# Patient Record
Sex: FEMALE | Race: WHITE | Employment: OTHER | URBAN - METROPOLITAN AREA
[De-identification: names, ages, dates, MRNs, and addresses within clinical notes are randomized per-mention and may not be internally consistent; named-entity substitution may affect disease eponyms.]

---

## 2017-03-16 ENCOUNTER — HOSPITAL ENCOUNTER (OUTPATIENT)
Dept: CT IMAGING | Age: 42
Discharge: HOME | End: 2017-03-16
Attending: FAMILY MEDICINE
Payer: COMMERCIAL

## 2017-03-16 DIAGNOSIS — R51.9 FACIAL PAIN: ICD-10-CM

## 2017-03-16 PROCEDURE — 70450 CT HEAD/BRAIN W/O DYE: CPT

## 2017-07-26 ENCOUNTER — HOSPITAL ENCOUNTER (EMERGENCY)
Age: 42
Discharge: HOME | End: 2017-07-26
Attending: EMERGENCY MEDICINE
Payer: COMMERCIAL

## 2017-07-26 ENCOUNTER — APPOINTMENT (OUTPATIENT)
Dept: CT IMAGING | Age: 42
End: 2017-07-26
Attending: EMERGENCY MEDICINE
Payer: COMMERCIAL

## 2017-07-26 ENCOUNTER — APPOINTMENT (OUTPATIENT)
Dept: GENERAL RADIOLOGY | Age: 42
End: 2017-07-26
Attending: EMERGENCY MEDICINE
Payer: COMMERCIAL

## 2017-07-26 VITALS
BODY MASS INDEX: 23.37 KG/M2 | SYSTOLIC BLOOD PRESSURE: 131 MMHG | OXYGEN SATURATION: 98 % | TEMPERATURE: 98.1 F | HEART RATE: 84 BPM | DIASTOLIC BLOOD PRESSURE: 72 MMHG | RESPIRATION RATE: 18 BRPM | HEIGHT: 62 IN | WEIGHT: 127 LBS

## 2017-07-26 DIAGNOSIS — R10.13 ABDOMINAL PAIN, EPIGASTRIC: Primary | ICD-10-CM

## 2017-07-26 LAB
ALBUMIN SERPL BCP-MCNC: 3.6 G/DL (ref 3.5–5)
ALBUMIN/GLOB SERPL: 1 {RATIO} (ref 1.1–2.2)
ALP SERPL-CCNC: 94 U/L (ref 45–117)
ALT SERPL-CCNC: 65 U/L (ref 12–78)
ANION GAP BLD CALC-SCNC: 4 MMOL/L (ref 5–15)
AST SERPL W P-5'-P-CCNC: 32 U/L (ref 15–37)
ATRIAL RATE: 66 BPM
BASOPHILS # BLD AUTO: 0 K/UL (ref 0–0.1)
BASOPHILS # BLD: 0 % (ref 0–1)
BILIRUB SERPL-MCNC: 0.6 MG/DL (ref 0.2–1)
BUN SERPL-MCNC: 8 MG/DL (ref 6–20)
BUN/CREAT SERPL: 11 (ref 12–20)
CALCIUM SERPL-MCNC: 8.6 MG/DL (ref 8.5–10.1)
CALCULATED P AXIS, ECG09: 19 DEGREES
CALCULATED R AXIS, ECG10: 49 DEGREES
CALCULATED T AXIS, ECG11: 43 DEGREES
CHLORIDE SERPL-SCNC: 101 MMOL/L (ref 97–108)
CK SERPL-CCNC: 95 U/L (ref 26–192)
CO2 SERPL-SCNC: 29 MMOL/L (ref 21–32)
CREAT SERPL-MCNC: 0.71 MG/DL (ref 0.55–1.02)
D DIMER PPP FEU-MCNC: 0.19 MG/L FEU (ref 0–0.65)
DIAGNOSIS, 93000: NORMAL
EOSINOPHIL # BLD: 0.1 K/UL (ref 0–0.4)
EOSINOPHIL NFR BLD: 1 % (ref 0–7)
ERYTHROCYTE [DISTWIDTH] IN BLOOD BY AUTOMATED COUNT: 15.5 % (ref 11.5–14.5)
GLOBULIN SER CALC-MCNC: 3.6 G/DL (ref 2–4)
GLUCOSE BLD STRIP.AUTO-MCNC: 70 MG/DL (ref 65–100)
GLUCOSE BLD STRIP.AUTO-MCNC: 80 MG/DL (ref 65–100)
GLUCOSE SERPL-MCNC: 210 MG/DL (ref 65–100)
HCT VFR BLD AUTO: 37 % (ref 35–47)
HGB BLD-MCNC: 12.3 G/DL (ref 11.5–16)
LYMPHOCYTES # BLD AUTO: 20 % (ref 12–49)
LYMPHOCYTES # BLD: 2.4 K/UL (ref 0.8–3.5)
MCH RBC QN AUTO: 29 PG (ref 26–34)
MCHC RBC AUTO-ENTMCNC: 33.2 G/DL (ref 30–36.5)
MCV RBC AUTO: 87.3 FL (ref 80–99)
MONOCYTES # BLD: 0.5 K/UL (ref 0–1)
MONOCYTES NFR BLD AUTO: 4 % (ref 5–13)
NEUTS SEG # BLD: 8.9 K/UL (ref 1.8–8)
NEUTS SEG NFR BLD AUTO: 75 % (ref 32–75)
P-R INTERVAL, ECG05: 146 MS
PLATELET # BLD AUTO: 462 K/UL (ref 150–400)
POTASSIUM SERPL-SCNC: 4.2 MMOL/L (ref 3.5–5.1)
PROT SERPL-MCNC: 7.2 G/DL (ref 6.4–8.2)
Q-T INTERVAL, ECG07: 396 MS
QRS DURATION, ECG06: 78 MS
QTC CALCULATION (BEZET), ECG08: 415 MS
RBC # BLD AUTO: 4.24 M/UL (ref 3.8–5.2)
SERVICE CMNT-IMP: NORMAL
SERVICE CMNT-IMP: NORMAL
SODIUM SERPL-SCNC: 134 MMOL/L (ref 136–145)
TROPONIN I SERPL-MCNC: <0.04 NG/ML
TSH SERPL DL<=0.05 MIU/L-ACNC: 1.26 UIU/ML (ref 0.36–3.74)
VENTRICULAR RATE, ECG03: 66 BPM
WBC # BLD AUTO: 12 K/UL (ref 3.6–11)

## 2017-07-26 PROCEDURE — 99285 EMERGENCY DEPT VISIT HI MDM: CPT

## 2017-07-26 PROCEDURE — 85379 FIBRIN DEGRADATION QUANT: CPT | Performed by: EMERGENCY MEDICINE

## 2017-07-26 PROCEDURE — 74011250637 HC RX REV CODE- 250/637: Performed by: EMERGENCY MEDICINE

## 2017-07-26 PROCEDURE — 93005 ELECTROCARDIOGRAM TRACING: CPT

## 2017-07-26 PROCEDURE — 96375 TX/PRO/DX INJ NEW DRUG ADDON: CPT

## 2017-07-26 PROCEDURE — 80053 COMPREHEN METABOLIC PANEL: CPT | Performed by: EMERGENCY MEDICINE

## 2017-07-26 PROCEDURE — 36415 COLL VENOUS BLD VENIPUNCTURE: CPT | Performed by: EMERGENCY MEDICINE

## 2017-07-26 PROCEDURE — 71010 XR CHEST PORT: CPT

## 2017-07-26 PROCEDURE — 74177 CT ABD & PELVIS W/CONTRAST: CPT

## 2017-07-26 PROCEDURE — 84484 ASSAY OF TROPONIN QUANT: CPT | Performed by: EMERGENCY MEDICINE

## 2017-07-26 PROCEDURE — 85025 COMPLETE CBC W/AUTO DIFF WBC: CPT | Performed by: EMERGENCY MEDICINE

## 2017-07-26 PROCEDURE — 82550 ASSAY OF CK (CPK): CPT | Performed by: EMERGENCY MEDICINE

## 2017-07-26 PROCEDURE — 96376 TX/PRO/DX INJ SAME DRUG ADON: CPT

## 2017-07-26 PROCEDURE — 82962 GLUCOSE BLOOD TEST: CPT

## 2017-07-26 PROCEDURE — 74011000250 HC RX REV CODE- 250: Performed by: EMERGENCY MEDICINE

## 2017-07-26 PROCEDURE — 74011250636 HC RX REV CODE- 250/636: Performed by: EMERGENCY MEDICINE

## 2017-07-26 PROCEDURE — 84443 ASSAY THYROID STIM HORMONE: CPT | Performed by: EMERGENCY MEDICINE

## 2017-07-26 PROCEDURE — 74011636320 HC RX REV CODE- 636/320: Performed by: EMERGENCY MEDICINE

## 2017-07-26 PROCEDURE — 96374 THER/PROPH/DIAG INJ IV PUSH: CPT

## 2017-07-26 RX ORDER — ONDANSETRON 2 MG/ML
4 INJECTION INTRAMUSCULAR; INTRAVENOUS
Status: COMPLETED | OUTPATIENT
Start: 2017-07-26 | End: 2017-07-26

## 2017-07-26 RX ORDER — SODIUM CHLORIDE 0.9 % (FLUSH) 0.9 %
10 SYRINGE (ML) INJECTION
Status: COMPLETED | OUTPATIENT
Start: 2017-07-26 | End: 2017-07-26

## 2017-07-26 RX ORDER — SODIUM CHLORIDE 9 MG/ML
50 INJECTION, SOLUTION INTRAVENOUS
Status: COMPLETED | OUTPATIENT
Start: 2017-07-26 | End: 2017-07-26

## 2017-07-26 RX ORDER — OXYCODONE HYDROCHLORIDE 10 MG/1
10 TABLET ORAL
Qty: 20 TAB | Refills: 0 | Status: SHIPPED | OUTPATIENT
Start: 2017-07-26 | End: 2021-07-09

## 2017-07-26 RX ORDER — ONDANSETRON 4 MG/1
4 TABLET, ORALLY DISINTEGRATING ORAL
Qty: 10 TAB | Refills: 0 | Status: SHIPPED | OUTPATIENT
Start: 2017-07-26

## 2017-07-26 RX ORDER — KETOROLAC TROMETHAMINE 30 MG/ML
30 INJECTION, SOLUTION INTRAMUSCULAR; INTRAVENOUS
Status: COMPLETED | OUTPATIENT
Start: 2017-07-26 | End: 2017-07-26

## 2017-07-26 RX ORDER — HYDROMORPHONE HYDROCHLORIDE 1 MG/ML
1 INJECTION, SOLUTION INTRAMUSCULAR; INTRAVENOUS; SUBCUTANEOUS
Status: COMPLETED | OUTPATIENT
Start: 2017-07-26 | End: 2017-07-26

## 2017-07-26 RX ORDER — FENTANYL CITRATE 50 UG/ML
50 INJECTION, SOLUTION INTRAMUSCULAR; INTRAVENOUS
Status: COMPLETED | OUTPATIENT
Start: 2017-07-26 | End: 2017-07-26

## 2017-07-26 RX ADMIN — LIDOCAINE HYDROCHLORIDE 40 ML: 20 SOLUTION ORAL; TOPICAL at 11:39

## 2017-07-26 RX ADMIN — IOPAMIDOL 100 ML: 755 INJECTION, SOLUTION INTRAVENOUS at 13:16

## 2017-07-26 RX ADMIN — DIATRIZOATE MEGLUMINE AND DIATRIZOATE SODIUM 30 ML: 660; 100 LIQUID ORAL; RECTAL at 12:04

## 2017-07-26 RX ADMIN — KETOROLAC TROMETHAMINE 30 MG: 30 INJECTION, SOLUTION INTRAMUSCULAR at 13:39

## 2017-07-26 RX ADMIN — FENTANYL CITRATE 50 MCG: 50 INJECTION, SOLUTION INTRAMUSCULAR; INTRAVENOUS at 12:04

## 2017-07-26 RX ADMIN — ONDANSETRON 4 MG: 2 INJECTION INTRAMUSCULAR; INTRAVENOUS at 12:04

## 2017-07-26 RX ADMIN — Medication 10 ML: at 13:16

## 2017-07-26 RX ADMIN — SODIUM CHLORIDE 50 ML/HR: 900 INJECTION, SOLUTION INTRAVENOUS at 13:16

## 2017-07-26 RX ADMIN — HYDROMORPHONE HYDROCHLORIDE 1 MG: 1 INJECTION, SOLUTION INTRAMUSCULAR; INTRAVENOUS; SUBCUTANEOUS at 14:45

## 2017-07-26 RX ADMIN — ONDANSETRON 4 MG: 2 INJECTION INTRAMUSCULAR; INTRAVENOUS at 14:45

## 2017-07-26 NOTE — ED PROVIDER NOTES
HPI Comments: Wolf Lozano, 36 y.o. 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 She states that her chest pain feels like a pressure, but she reports that it feels like a sharp pain with ambulation and exertion. She also c/o progressively worsening fatigue over the past two days and a generalized headache x 1 week. She reports a history of IDDM and states that her BG has been elevated to ~ 300 recently. She states that her BG is typically ~ 170. She notes that she is prescribed Creon and Synthroid. She states that she was last evaluated by Endocrinology 2 months ago, and she states that her thyroid levels have been well-managed. She states that she has had a PE/DVT in the past. She denies a history of GERD, being on hormone therapy, or recent heavy lifting. She specifically denies cough, nausea, vomiting, congestion, melena, or blood in her stool. PCP: Shayla Keller MD    Social history significant for: - Tobacco (former), - EtOH, - Illicit Drug Use    There are no other complaints, changes, or physical findings at this time. Written by NEYDA Heck, as dictated by Colt Stauffer DO. The history is provided by the patient. No  was used.         Past Medical History:   Diagnosis Date    Anxiety     Depression     Hypercholesterolemia     Hypertension     Ill-defined condition     pancreatitis 2015    Liver disease     \"liver failure\" 2015    Long term (current) use of anticoagulants     Thromboembolus ([facility] [address]        Past Surgical History:   Procedure Laterality Date    ABDOMEN SURGERY PROC UNLISTED      Pt stated \"I have no pancreas\"     HX CHOLECYSTECTOMY      HX HYSTERECTOMY      HX SPLENECTOMY           Family History:   Problem Relation Age of Onset    Cancer Other     Hypertension Other     Cancer Mother     Cancer Maternal Aunt     Cancer Maternal Grandmother        Social History     Social History    Marital status:      Spouse name: N/A    Number of children: N/A    Years of education: N/A     Occupational History    Not on file. Social History Main Topics    Smoking status: Former Smoker     Packs/day: 1.00     Types: Cigarettes    Smokeless tobacco: Not on file    Alcohol use No      Comment: pt 30 days clean    Drug use: No    Sexual activity: Yes     Partners: Male     Birth control/ protection: Pill     Other Topics Concern    Not on file     Social History Narrative         ALLERGIES: Review of patient's allergies indicates no known allergies. Review of Systems   Constitutional: Positive for fatigue. Negative for appetite change, chills and fever. HENT: Negative for congestion, rhinorrhea, sinus pressure and sore throat. Eyes: Negative. Respiratory: Negative. Negative for cough, choking, chest tightness, shortness of breath and wheezing. Cardiovascular: Positive for chest pain. Negative for palpitations and leg swelling. Gastrointestinal: Negative for abdominal pain, blood in stool, constipation, diarrhea, nausea and vomiting. Endocrine: Negative. Genitourinary: Negative. Negative for difficulty urinating, dysuria, flank pain and urgency. Musculoskeletal: Negative. Skin: Negative. Neurological: Positive for headaches. Negative for dizziness, speech difficulty, weakness, light-headedness and numbness. Psychiatric/Behavioral: Negative. All other systems reviewed and are negative.       Vitals:    07/26/17 1036 07/26/17 1100 07/26/17 1447 07/26/17 1448   BP: 100/70 108/68 113/71    Pulse: 77 66  73   Resp: 15 15  12   Temp: 98.1 °F (36.7 °C)      SpO2: 98% 97%  98%   Weight: 57.6 kg (127 lb)      Height: 5' 2\" (1.575 m)               Physical Exam   Constitutional: She is oriented to person, place, and time. She appears well-developed and well-nourished. No distress. HENT:   Head: Normocephalic and atraumatic. Mouth/Throat: Oropharynx is clear and moist. No oropharyngeal exudate. Eyes: Conjunctivae and EOM are normal. Pupils are equal, round, and reactive to light. Neck: Normal range of motion. Neck supple. No JVD present. No tracheal deviation present. Cardiovascular: Normal rate, regular rhythm, normal heart sounds and intact distal pulses. No murmur heard. Pulmonary/Chest: Effort normal and breath sounds normal. No stridor. No respiratory distress. She has no wheezes. She has no rales. She exhibits no tenderness. Abdominal: Soft. She exhibits no distension. There is tenderness (Epigastric). There is no rebound and no guarding. Musculoskeletal: Normal range of motion. She exhibits no edema or tenderness. Neurological: She is alert and oriented to person, place, and time. No cranial nerve deficit. No gross motor or sensory deficits    Skin: Skin is warm and dry. She is not diaphoretic. Psychiatric: She has a normal mood and affect. Her behavior is normal.   Nursing note and vitals reviewed. MDM  Number of Diagnoses or Management Options  Diagnosis management comments:   DDx: Electrolyte abnormality, ACS, PE, musculoskeletal pain, gastritis, anemia       Amount and/or Complexity of Data Reviewed  Clinical lab tests: ordered and reviewed  Tests in the radiology section of CPT®: ordered and reviewed  Tests in the medicine section of CPT®: ordered and reviewed  Review and summarize past medical records: yes  Independent visualization of images, tracings, or specimens: yes    Patient Progress  Patient progress: stable       ED Course       Procedures     EKG- NSR, rate 66, ross axis/pr/qrs, no acute ST-T wave changes, Cari Luis Felipe, DO      Progress Note:  11:50 AM  The patient was re-evaluated, and she is  in her epigastric region. Will CT her abdomen at this time.  The pt also continues to complain of a headache. She reports her  is coming to the ED at this time, will administer a stronger pain medication. Written by Dillon Gallegos ED Scribe, as dictated by Kathy Hobson DO. Progress Note:  2:59 PM  The patient was re-evaluated and all of her lab and imaging results have been discussed with her. Will discharge at this time. Written by NEYDA Bettsibsarah, as dictated by Kathy Hobson DO.    LABORATORY TESTS:  Recent Results (from the past 12 hour(s))   EKG, 12 LEAD, INITIAL    Collection Time: 07/26/17 10:28 AM   Result Value Ref Range    Ventricular Rate 66 BPM    Atrial Rate 66 BPM    P-R Interval 146 ms    QRS Duration 78 ms    Q-T Interval 396 ms    QTC Calculation (Bezet) 415 ms    Calculated P Axis 19 degrees    Calculated R Axis 49 degrees    Calculated T Axis 43 degrees    Diagnosis         Normal sinus rhythm  Confirmed by Floydene Diesel ([id number]) on 7/26/2017 12:24:36 PM     CBC WITH AUTOMATED DIFF    Collection Time: 07/26/17 10:49 AM   Result Value Ref Range    WBC 12.0 (H) 3.6 - 11.0 K/uL    RBC 4.24 3.80 - 5.20 M/uL    HGB 12.3 11.5 - 16.0 g/dL    HCT 37.0 35.0 - 47.0 %    MCV 87.3 80.0 - 99.0 FL    MCH 29.0 26.0 - 34.0 PG    MCHC 33.2 30.0 - 36.5 g/dL    RDW 15.5 (H) 11.5 - 14.5 %    PLATELET 261 (H) 524 - 400 K/uL    NEUTROPHILS 75 32 - 75 %    LYMPHOCYTES 20 12 - 49 %    MONOCYTES 4 (L) 5 - 13 %    EOSINOPHILS 1 0 - 7 %    BASOPHILS 0 0 - 1 %    ABS. NEUTROPHILS 8.9 (H) 1.8 - 8.0 K/UL    ABS. LYMPHOCYTES 2.4 0.8 - 3.5 K/UL    ABS. MONOCYTES 0.5 0.0 - 1.0 K/UL    ABS. EOSINOPHILS 0.1 0.0 - 0.4 K/UL    ABS.  BASOPHILS 0.0 0.0 - 0.1 K/UL   METABOLIC PANEL, COMPREHENSIVE    Collection Time: 07/26/17 10:49 AM   Result Value Ref Range    Sodium 134 (L) 136 - 145 mmol/L    Potassium 4.2 3.5 - 5.1 mmol/L    Chloride 101 97 - 108 mmol/L    CO2 29 21 - 32 mmol/L    Anion gap 4 (L) 5 - 15 mmol/L    Glucose 210 (H) 65 - 100 mg/dL    BUN 8 6 - 20 MG/DL Creatinine 0.71 0.55 - 1.02 MG/DL    BUN/Creatinine ratio 11 (L) 12 - 20      GFR est AA >60 >60 ml/min/1.73m2    GFR est non-AA >60 >60 ml/min/1.73m2    Calcium 8.6 8.5 - 10.1 MG/DL    Bilirubin, total 0.6 0.2 - 1.0 MG/DL    ALT (SGPT) 65 12 - 78 U/L    AST (SGOT) 32 15 - 37 U/L    Alk. phosphatase 94 45 - 117 U/L    Protein, total 7.2 6.4 - 8.2 g/dL    Albumin 3.6 3.5 - 5.0 g/dL    Globulin 3.6 2.0 - 4.0 g/dL    A-G Ratio 1.0 (L) 1.1 - 2.2     CK W/ REFLX CKMB    Collection Time: 07/26/17 10:49 AM   Result Value Ref Range    CK 95 26 - 192 U/L   TROPONIN I    Collection Time: 07/26/17 10:49 AM   Result Value Ref Range    Troponin-I, Qt. <0.04 <0.05 ng/mL   D DIMER    Collection Time: 07/26/17 10:49 AM   Result Value Ref Range    D-dimer 0.19 0.00 - 0.65 mg/L FEU   TSH 3RD GENERATION    Collection Time: 07/26/17 10:49 AM   Result Value Ref Range    TSH 1.26 0.36 - 3.74 uIU/mL   GLUCOSE, POC    Collection Time: 07/26/17  2:52 PM   Result Value Ref Range    Glucose (POC) 70 65 - 100 mg/dL    Performed by Mirella Magic    GLUCOSE, POC    Collection Time: 07/26/17  3:09 PM   Result Value Ref Range    Glucose (POC) 80 65 - 100 mg/dL    Performed by Mirella Magic        IMAGING RESULTS:  CT Results  (Last 48 hours)               07/26/17 1321  CT ABD PELV W CONT Final result    Narrative:  EXAM:  CT ABD PELV W CONT       INDICATION: pain upper abd prior Splenectomy, Pancreatectomy, matthew       COMPARISON: December 10, 2016       CONTRAST:  100 mL of Isovue-370. TECHNIQUE:    Following the uneventful intravenous administration of contrast, thin axial   images were obtained through the abdomen and pelvis. Coronal and sagittal   reconstructions were generated. Oral contrast was administered. CT dose   reduction was achieved through use of a standardized protocol tailored for this   examination and automatic exposure control for dose modulation. FINDINGS:    LUNG BASES: Clear.    INCIDENTALLY IMAGED HEART AND MEDIASTINUM: Unremarkable. LIVER: There is non masslike enhancement in the inferior right hepatic lobe. There is pneumobilia from prior sphincterotomy   GALLBLADDER: Surgically absent   SPLEEN: Surgically absent   PANCREAS: Patient is post pancreatectomy   ADRENALS: Unremarkable. KIDNEYS: Question small cyst upper pole right kidney. There is scarring in the   upper pole left kidney. No hydronephrosis   STOMACH: Unremarkable. SMALL BOWEL: Proximal loops of small bowel are prominent but a definite   transition is not identified. COLON: No dilatation or wall thickening. APPENDIX: Unremarkable. PERITONEUM: No ascites or pneumoperitoneum. RETROPERITONEUM: Aorta is normal in caliber but minimally atherosclerotic. There   is a small soft tissue density at the junction of the external and internal   iliac arteries measuring 1.4 x 1.4 cm. Uncertain if this represents a node or   residual ovarian tissue. REPRODUCTIVE ORGANS: Uterus is surgically absent   URINARY BLADDER: No mass or calculus. BONES: No destructive bone lesion. ADDITIONAL COMMENTS: N/A       IMPRESSION:   1. Mild prominence of the proximal small bowel without definite evidence of   obstruction   2. Postsurgical changes in the upper abdomen as detailed above   3. Subtle soft tissue density overlying the junction of the right internal and   external iliac arteries. Uncertain if this represents a small node or residual   ovarian tissue               CXR Results  (Last 48 hours)               07/26/17 1115  XR CHEST PORT Final result    Narrative:  EXAM:  XR CHEST PORT       INDICATION:  chest pain, angina, chest pressure radiating to back       COMPARISON:  5/4/2015       FINDINGS: A portable AP radiograph of the chest was obtained at 1106 hours. The   patient is on a cardiac monitor. The lungs are clear.   The cardiac and   mediastinal contours and pulmonary vascularity are normal.  The bones and soft   tissues are grossly within normal limits. IMPRESSION: Normal chest.                     MEDICATIONS GIVEN:  Medications   mylanta/viscous lidocaine (OLIVIA)(GI COCKTAIL) (40 mL Oral Given 7/26/17 1139)   fentaNYL citrate (PF) injection 50 mcg (50 mcg IntraVENous Given 7/26/17 1204)   ondansetron (ZOFRAN) injection 4 mg (4 mg IntraVENous Given 7/26/17 1204)   diatrizoate meglumine-d.sodium (MD-GASTROVIEW,GASTROGRAFIN) 66-10 % contrast solution 30 mL (30 mL Oral Given 7/26/17 1204)   0.9% sodium chloride infusion (50 mL/hr IntraVENous New Bag 7/26/17 1316)   iopamidol (ISOVUE-370) 76 % injection 100 mL (100 mL IntraVENous Given 7/26/17 1316)   sodium chloride (NS) flush 10 mL (10 mL IntraVENous Given 7/26/17 1316)   ketorolac (TORADOL) injection 30 mg (30 mg IntraVENous Given 7/26/17 1339)   HYDROmorphone (PF) (DILAUDID) injection 1 mg (1 mg IntraVENous Given 7/26/17 1445)   ondansetron (ZOFRAN) injection 4 mg (4 mg IntraVENous Given 7/26/17 1445)       IMPRESSION:  1. Abdominal pain, epigastric      Discussed with pt CT findings, she has had prior SBO due to adhesions, There are some areas of concern but no transition point and pt tolerating PO. No signs of infection, pt d/c home return to the ED with any worsening of symptoms, pt to follow up with her Transplant surgeon. PLAN:  1. Current Discharge Medication List      START taking these medications    Details   oxyCODONE IR (ROXICODONE) 10 mg tab immediate release tablet Take 1 Tab by mouth every four (4) hours as needed. Max Daily Amount: 60 mg.  Qty: 20 Tab, Refills: 0         CONTINUE these medications which have CHANGED    Details   ondansetron (ZOFRAN ODT) 4 mg disintegrating tablet Take 1 Tab by mouth every eight (8) hours as needed for Nausea. Qty: 10 Tab, Refills: 0           2.    Follow-up Information     Follow up With Details Comments Contact Info    Shaunna Cuevas MD   [address] [facility] [address] [phone]          Return to ED if worse Discharge Note:  3:21 PM  The pt is ready for discharge. The pt's signs, symptoms, diagnosis, and discharge instructions have been discussed and pt has conveyed their understanding. The pt is to follow up as recommended or return to ER should their symptoms worsen. Plan has been discussed and pt is in agreement. This note is prepared by Rick Rey, acting as a Scribe for Denisse Hernández, [address] [place] [address]: The scribe's documentation has been prepared under my direction and personally reviewed by me in its entirety. I confirm that the notes above accurately reflects all work, treatment, procedures, and medical decision making performed by me.

## 2017-07-26 NOTE — ED NOTES
Discharge instructions reviewed with pt and given by Dr. Josiah Connolly. Taken off of monitor. IV discontinued with catheter intact. Call bell within reach. Pt ambulated out of ED with steady gait accompanied by . No other complaints voiced at this time.

## 2017-07-26 NOTE — ED NOTES
Assumed care of patient. Patient is alert and oriented, does not appear to be in distress. Patient ambulatory to ED with c/o chest pressure radiating to back, and has been fatigued and has a headache x 1 week, pt denies any sob. Had pancreas and spleen removed in October, is type 1 diabetic and states BS have been running in the 300's the past week.

## 2017-07-26 NOTE — ED NOTES
HYPOGLYCEMIC EPISODE DOCUMENTATION    Patient with hypoglycemic episode(s) at 1452(time) on 7/26/17(date). BG value(s) pre-treatment 79    Was patient symptomatic?  [x] yes, [] no  Patient was treated with the following rescue medications/treatments: [] D50                [] Glucose tablets                [] Glucagon                [x] 4oz juice                [] 6oz reg soda                [] 8oz low fat milk  BG value post-treatment: 80  Once BG treated and value greater than 80mg/dl, pt was provided with the following:  [x] snack  [x] meal

## 2017-07-26 NOTE — ED NOTES
Assumed care of pt. Pt resting in stretcher. Call bell within reach. Awaiting medication. No other complaints voiced at this time.

## 2017-07-26 NOTE — DISCHARGE INSTRUCTIONS

## 2017-12-20 LAB — CREATININE, EXTERNAL: 0.6

## 2018-11-01 ENCOUNTER — HOSPITAL ENCOUNTER (OUTPATIENT)
Dept: GENERAL RADIOLOGY | Age: 43
Discharge: HOME | End: 2018-11-03
Payer: COMMERCIAL

## 2018-11-01 DIAGNOSIS — M54.5 LOW BACK PAIN, UNSPECIFIED BACK PAIN LATERALITY, UNSPECIFIED CHRONICITY, WITH SCIATICA PRESENCE UNSPECIFIED: ICD-10-CM

## 2018-11-01 PROCEDURE — 72110 X-RAY EXAM L-2 SPINE 4/>VWS: CPT

## 2019-02-06 ENCOUNTER — HOSPITAL ENCOUNTER (EMERGENCY)
Age: 44
Discharge: LEFT BEFORE DISCHARGE | End: 2019-02-06
Attending: EMERGENCY MEDICINE
Payer: MEDICARE

## 2019-02-06 VITALS
DIASTOLIC BLOOD PRESSURE: 80 MMHG | WEIGHT: 131.84 LBS | HEIGHT: 62 IN | TEMPERATURE: 98.3 F | OXYGEN SATURATION: 100 % | HEART RATE: 83 BPM | RESPIRATION RATE: 16 BRPM | BODY MASS INDEX: 24.26 KG/M2 | SYSTOLIC BLOOD PRESSURE: 106 MMHG

## 2019-02-06 DIAGNOSIS — Z53.21 PATIENT LEFT WITHOUT BEING SEEN: Primary | ICD-10-CM

## 2019-02-06 LAB
APPEARANCE UR: CLEAR
BILIRUB UR QL: NEGATIVE
COLOR UR: NORMAL
GLUCOSE UR STRIP.AUTO-MCNC: NEGATIVE MG/DL
HGB UR QL STRIP: NEGATIVE
KETONES UR QL STRIP.AUTO: NEGATIVE MG/DL
LEUKOCYTE ESTERASE UR QL STRIP.AUTO: NEGATIVE
NITRITE UR QL STRIP.AUTO: NEGATIVE
PH UR STRIP: 6 [PH] (ref 5–8)
PROT UR STRIP-MCNC: NEGATIVE MG/DL
SP GR UR REFRACTOMETRY: 1 (ref 1–1.03)
UROBILINOGEN UR QL STRIP.AUTO: 0.2 EU/DL (ref 0.2–1)

## 2019-02-06 PROCEDURE — 75810000275 HC EMERGENCY DEPT VISIT NO LEVEL OF CARE

## 2019-02-06 PROCEDURE — 81003 URINALYSIS AUTO W/O SCOPE: CPT

## 2019-02-07 NOTE — ED PROVIDER NOTES
HPI  
 
Past Medical History:  
Diagnosis Date  Anxiety  Depression  Hypercholesterolemia  Hypertension  Ill-defined condition   
 pancreatitis 2015  Liver disease \"liver failure\" 3003  Long term (current) use of anticoagulants  Thromboembolus ([address] Past Surgical History:  
Procedure Laterality Date [address] UNLISTED Pt stated \"I have no pancreas\"  HX CHOLECYSTECTOMY  HX HYSTERECTOMY  HX SPLENECTOMY Family History:  
Problem Relation Age of Onset  Cancer Other  Hypertension Other  Cancer Mother  Cancer Maternal Aunt  Cancer Maternal Grandmother Social History Socioeconomic History  Marital status:  Spouse name: Not on file  Number of children: Not on file  Years of education: Not on file  Highest education level: Not on file Social Needs  Financial resource strain: Not on file  Food insecurity - worry: Not on file  Food insecurity - inability: Not on file  Transportation needs - medical: Not on file  Transportation needs - non-medical: Not on file Occupational History  Not on file Tobacco Use  Smoking status: Former Smoker Packs/day: 1.00 Types: Cigarettes Substance and Sexual Activity  Alcohol use: No  
  Comment: pt 30 days clean  Drug use: No  
 Sexual activity: Yes  
  Partners: Male Birth control/protection: Pill Other Topics Concern  Not on file Social History Narrative  Not on file ALLERGIES: Patient has no known allergies. Review of Systems Vitals:  
 02/06/19 1430 BP: 106/80 Pulse: 83 Resp: 16 Temp: 98.3 °F (36.8 °C) SpO2: 100% Weight: 59.8 kg (131 lb 13.4 oz) Height: 5' 2\" (1.575 m) Physical Exam  
 
MDM Procedures 12:11 PM 
 
I was inadvertently assigned to this patient's treatment team.  I did not see this patient nor did I have any contact with this patient. I had no involvement during the evaluation, treatment or disposition of this patient. I am signing off this note to indicate only why my name appeared in the record.  
Sammy Wilhelm, DO

## 2019-03-15 ENCOUNTER — HOSPITAL ENCOUNTER (OUTPATIENT)
Dept: GENERAL RADIOLOGY | Age: 44
Discharge: HOME | End: 2019-03-15
Payer: MEDICARE

## 2019-03-15 DIAGNOSIS — R06.02 SHORTNESS OF BREATH: ICD-10-CM

## 2019-03-15 PROCEDURE — 71046 X-RAY EXAM CHEST 2 VIEWS: CPT

## 2019-04-16 ENCOUNTER — HOSPITAL ENCOUNTER (OUTPATIENT)
Dept: CT IMAGING | Age: 44
Discharge: HOME | End: 2019-04-16
Payer: MEDICARE

## 2019-04-16 DIAGNOSIS — R06.02 SOB (SHORTNESS OF BREATH): ICD-10-CM

## 2019-04-16 PROCEDURE — 71250 CT THORAX DX C-: CPT

## 2019-07-16 ENCOUNTER — HOSPITAL ENCOUNTER (EMERGENCY)
Age: 44
Discharge: HOME | End: 2019-07-16
Attending: EMERGENCY MEDICINE
Payer: MEDICARE

## 2019-07-16 VITALS
TEMPERATURE: 98.3 F | HEIGHT: 62 IN | SYSTOLIC BLOOD PRESSURE: 127 MMHG | DIASTOLIC BLOOD PRESSURE: 97 MMHG | BODY MASS INDEX: 23.61 KG/M2 | RESPIRATION RATE: 16 BRPM | WEIGHT: 128.31 LBS | OXYGEN SATURATION: 98 % | HEART RATE: 79 BPM

## 2019-07-16 DIAGNOSIS — R19.7 DIARRHEA OF PRESUMED INFECTIOUS ORIGIN: Primary | ICD-10-CM

## 2019-07-16 LAB
ALBUMIN SERPL-MCNC: 3.8 G/DL (ref 3.5–5)
ALBUMIN/GLOB SERPL: 1.1 {RATIO} (ref 1.1–2.2)
ALP SERPL-CCNC: 73 U/L (ref 45–117)
ALT SERPL-CCNC: 79 U/L (ref 12–78)
ANION GAP SERPL CALC-SCNC: 6 MMOL/L (ref 5–15)
APPEARANCE UR: CLEAR
AST SERPL-CCNC: 72 U/L (ref 15–37)
BACTERIA URNS QL MICRO: NEGATIVE /HPF
BASOPHILS # BLD: 0.1 K/UL (ref 0–0.1)
BASOPHILS NFR BLD: 1 % (ref 0–1)
BILIRUB SERPL-MCNC: 0.6 MG/DL (ref 0.2–1)
BILIRUB UR QL: NEGATIVE
BUN SERPL-MCNC: 12 MG/DL (ref 6–20)
BUN/CREAT SERPL: 14 (ref 12–20)
CALCIUM SERPL-MCNC: 9.1 MG/DL (ref 8.5–10.1)
CAOX CRY URNS QL MICRO: ABNORMAL
CHLORIDE SERPL-SCNC: 104 MMOL/L (ref 97–108)
CO2 SERPL-SCNC: 28 MMOL/L (ref 21–32)
COLOR UR: ABNORMAL
CREAT SERPL-MCNC: 0.84 MG/DL (ref 0.55–1.02)
DIFFERENTIAL METHOD BLD: ABNORMAL
EOSINOPHIL # BLD: 0.3 K/UL (ref 0–0.4)
EOSINOPHIL NFR BLD: 3 % (ref 0–7)
EPITH CASTS URNS QL MICRO: ABNORMAL /LPF
ERYTHROCYTE [DISTWIDTH] IN BLOOD BY AUTOMATED COUNT: 17.8 % (ref 11.5–14.5)
GLOBULIN SER CALC-MCNC: 3.5 G/DL (ref 2–4)
GLUCOSE SERPL-MCNC: 213 MG/DL (ref 65–100)
GLUCOSE UR STRIP.AUTO-MCNC: 500 MG/DL
HCT VFR BLD AUTO: 33.8 % (ref 35–47)
HGB BLD-MCNC: 11.7 G/DL (ref 11.5–16)
HGB UR QL STRIP: NEGATIVE
HYALINE CASTS URNS QL MICRO: ABNORMAL /LPF (ref 0–5)
IMM GRANULOCYTES # BLD AUTO: 0 K/UL (ref 0–0.04)
IMM GRANULOCYTES NFR BLD AUTO: 0 % (ref 0–0.5)
KETONES UR QL STRIP.AUTO: NEGATIVE MG/DL
LEUKOCYTE ESTERASE UR QL STRIP.AUTO: ABNORMAL
LIPASE SERPL-CCNC: 27 U/L (ref 73–393)
LYMPHOCYTES # BLD: 1.9 K/UL (ref 0.8–3.5)
LYMPHOCYTES NFR BLD: 22 % (ref 12–49)
MCH RBC QN AUTO: 31 PG (ref 26–34)
MCHC RBC AUTO-ENTMCNC: 34.6 G/DL (ref 30–36.5)
MCV RBC AUTO: 89.4 FL (ref 80–99)
MONOCYTES # BLD: 0.8 K/UL (ref 0–1)
MONOCYTES NFR BLD: 9 % (ref 5–13)
NEUTS SEG # BLD: 5.5 K/UL (ref 1.8–8)
NEUTS SEG NFR BLD: 65 % (ref 32–75)
NITRITE UR QL STRIP.AUTO: NEGATIVE
NRBC # BLD: 0 K/UL (ref 0–0.01)
NRBC BLD-RTO: 0 PER 100 WBC
PH UR STRIP: 5.5 [PH] (ref 5–8)
PLATELET # BLD AUTO: 512 K/UL (ref 150–400)
PMV BLD AUTO: 10.6 FL (ref 8.9–12.9)
POTASSIUM SERPL-SCNC: 4.1 MMOL/L (ref 3.5–5.1)
PROT SERPL-MCNC: 7.3 G/DL (ref 6.4–8.2)
PROT UR STRIP-MCNC: NEGATIVE MG/DL
RBC # BLD AUTO: 3.78 M/UL (ref 3.8–5.2)
RBC #/AREA URNS HPF: ABNORMAL /HPF (ref 0–5)
SODIUM SERPL-SCNC: 138 MMOL/L (ref 136–145)
SP GR UR REFRACTOMETRY: 1.02 (ref 1–1.03)
UA: UC IF INDICATED,UAUC: ABNORMAL
UROBILINOGEN UR QL STRIP.AUTO: 0.2 EU/DL (ref 0.2–1)
WBC # BLD AUTO: 8.5 K/UL (ref 3.6–11)
WBC URNS QL MICRO: ABNORMAL /HPF (ref 0–4)

## 2019-07-16 PROCEDURE — 80053 COMPREHEN METABOLIC PANEL: CPT

## 2019-07-16 PROCEDURE — 81001 URINALYSIS AUTO W/SCOPE: CPT

## 2019-07-16 PROCEDURE — 99283 EMERGENCY DEPT VISIT LOW MDM: CPT

## 2019-07-16 PROCEDURE — 74011250636 HC RX REV CODE- 250/636: Performed by: PHYSICIAN ASSISTANT

## 2019-07-16 PROCEDURE — 36415 COLL VENOUS BLD VENIPUNCTURE: CPT

## 2019-07-16 PROCEDURE — 96374 THER/PROPH/DIAG INJ IV PUSH: CPT

## 2019-07-16 PROCEDURE — 85025 COMPLETE CBC W/AUTO DIFF WBC: CPT

## 2019-07-16 PROCEDURE — 96361 HYDRATE IV INFUSION ADD-ON: CPT

## 2019-07-16 PROCEDURE — 96375 TX/PRO/DX INJ NEW DRUG ADDON: CPT

## 2019-07-16 PROCEDURE — 87086 URINE CULTURE/COLONY COUNT: CPT

## 2019-07-16 PROCEDURE — 83690 ASSAY OF LIPASE: CPT

## 2019-07-16 RX ORDER — SODIUM CHLORIDE 9 MG/ML
1000 INJECTION, SOLUTION INTRAVENOUS ONCE
Status: COMPLETED | OUTPATIENT
Start: 2019-07-16 | End: 2019-07-16

## 2019-07-16 RX ORDER — FENTANYL CITRATE 50 UG/ML
50 INJECTION, SOLUTION INTRAMUSCULAR; INTRAVENOUS
Status: COMPLETED | OUTPATIENT
Start: 2019-07-16 | End: 2019-07-16

## 2019-07-16 RX ORDER — VANCOMYCIN HYDROCHLORIDE 125 MG/1
125 CAPSULE ORAL 4 TIMES DAILY
Qty: 28 CAP | Refills: 0 | Status: SHIPPED | OUTPATIENT
Start: 2019-07-16 | End: 2019-07-23

## 2019-07-16 RX ORDER — ONDANSETRON 2 MG/ML
4 INJECTION INTRAMUSCULAR; INTRAVENOUS
Status: COMPLETED | OUTPATIENT
Start: 2019-07-16 | End: 2019-07-16

## 2019-07-16 RX ORDER — MORPHINE SULFATE 4 MG/ML
4 INJECTION INTRAVENOUS ONCE
Status: COMPLETED | OUTPATIENT
Start: 2019-07-16 | End: 2019-07-16

## 2019-07-16 RX ORDER — HYDROCODONE BITARTRATE AND ACETAMINOPHEN 5; 325 MG/1; MG/1
1 TABLET ORAL
Qty: 10 TAB | Refills: 0 | Status: SHIPPED | OUTPATIENT
Start: 2019-07-16 | End: 2019-07-19

## 2019-07-16 RX ORDER — ONDANSETRON 4 MG/1
4 TABLET, ORALLY DISINTEGRATING ORAL
Qty: 20 TAB | Refills: 0 | Status: SHIPPED | OUTPATIENT
Start: 2019-07-16

## 2019-07-16 RX ADMIN — ONDANSETRON 4 MG: 2 INJECTION INTRAMUSCULAR; INTRAVENOUS at 12:56

## 2019-07-16 RX ADMIN — SODIUM CHLORIDE 1000 ML: 900 INJECTION, SOLUTION INTRAVENOUS at 12:56

## 2019-07-16 RX ADMIN — FENTANYL CITRATE 50 MCG: 50 INJECTION, SOLUTION INTRAMUSCULAR; INTRAVENOUS at 12:56

## 2019-07-16 RX ADMIN — MORPHINE SULFATE 4 MG: 4 INJECTION INTRAVENOUS at 14:59

## 2019-07-16 NOTE — DISCHARGE INSTRUCTIONS
Patient Education        Clostridium Difficile Colitis: Care Instructions  Your Care Instructions    Clostridium difficile (also called C. difficile) are bacteria that can cause swelling and irritation of the large intestine, or colon. This inflammation is also called colitis. It can cause diarrhea, fever, and belly cramps. You may get C. difficile colitis if you take antibiotics. The infection is most common in people who are taking antibiotics while in the hospital. It is also common in older people in hospitals and nursing homes. Severe disease could cause the colon to swell to many times its normal size (toxic megacolon). This can cause death and needs emergency treatment. You may have a swollen belly that is painful or tender, a rapid heartbeat, and a fever. Follow-up care is a key part of your treatment and safety. Be sure to make and go to all appointments, and call your doctor if you are having problems. It's also a good idea to know your test results and keep a list of the medicines you take. How can you care for yourself at home? · Your doctor may give you antibiotics to treat C. difficile colitis. If your doctor prescribes an antibiotic, he or she will give you a different antibiotic than the one that caused your infection. Take your antibiotics as directed. Do not stop taking them just because you feel better. You need to take the full course of antibiotics. · To prevent dehydration, drink plenty of fluids, enough so that your urine is light yellow or clear like water. Choose water and other caffeine-free clear liquids until you feel better. If you have kidney, heart, or liver disease and have to limit fluids, talk with your doctor before you increase the amount of fluids you drink. · Begin eating small amounts of mild foods, if you feel like it. Try yogurt that has live cultures of lactobacillus (check the label). ?  Avoid spicy foods, fruits, alcohol, and caffeine until 48 hours after all symptoms go away. ? Avoid chewing gum that contains sorbitol. ? Avoid dairy products (except for yogurt with lactobacillus) while you have diarrhea and for 3 days after symptoms go away. · To prevent the spread of C. difficile, practice good hygiene. Keep your hands clean by washing them well and often with soap and clean, running water. Alcohol-based hand sanitizers do not kill C. difficile. When should you call for help? Call 911 if:    · You passed out (lost consciousness).    Call your doctor now or seek immediate medical care if:    · You have a fever over 101°F or shaking chills.     · You feel lightheaded or have a fast heart rate.     · You pass stools that are almost always bloody.     · You have signs of needing more fluids. You have sunken eyes and a dry mouth, and you pass only a little dark urine.     · You have severe belly pain with or without bloating.     · You have severe vomiting and cannot keep down liquids.     · You are not passing any stools or gas.    Watch closely for changes in your health, and be sure to contact your doctor if:    · You do not get better as expected. Where can you learn more? Go to http://sidney-irina.info/. Enter ([phone] in the search box to learn more about \"Clostridium Difficile Colitis: Care Instructions. \"  Current as of: July 30, 2018  Content Version: 11.9  © 2891-1763 [employer]. Care instructions adapted under license by [employer] (which disclaims liability or warranty for this information). If you have questions about a medical condition or this instruction, always ask your healthcare professional. [place] [address] any warranty or liability for your use of this information.

## 2019-07-16 NOTE — ED PROVIDER NOTES
EMERGENCY DEPARTMENT HISTORY AND PHYSICAL EXAM      Date: 7/16/2019  Patient Name: Aleisha Vasquez    History of Presenting Illness     Chief Complaint   Patient presents with    Diarrhea     pt with hx of c-diff has been experiencing watery diarrhea since yesterday and vomiting since today. pt had breast surgery this past tuesday and has been on \"heavy antibiotics since\"    Vomiting       History Provided By: Patient    HPI: Aleisha Vasquez, 38 y.o. female with PMHx significant for anxiety, depression, hypercholesterolemia, hypertension, IDDM, alcoholic pancreatitis status post pancreatectomy, splenectomy, and cholecystectomy with islet cell transplant in 2016, thromboembolus, presents to the ED with cc of vomiting and diarrhea. Patient reports that yesterday, she began having multiple episodes of watery diarrhea. She has chronic diarrhea but says that this is different to her baseline diarrhea. She has had associated crampy, diffuse abdominal pain. Today, she had 2 episodes of vomiting. She denies blood in stool or emesis. She has had C. difficile before in the past and is currently on an antibiotic after having a breast surgery last week. She denies fever, urinary symptoms. There are no other complaints, changes, or physical findings at this time. PCP: Hiral Edgar MD    No current facility-administered medications on file prior to encounter. Current Outpatient Medications on File Prior to Encounter   Medication Sig Dispense Refill    oxyCODONE IR (ROXICODONE) 10 mg tab immediate release tablet Take 1 Tab by mouth every four (4) hours as needed. Max Daily Amount: 60 mg. 20 Tab 0    ondansetron (ZOFRAN ODT) 4 mg disintegrating tablet Take 1 Tab by mouth every eight (8) hours as needed for Nausea. 10 Tab 0    buPROPion SR (WELLBUTRIN SR) 150 mg SR tablet Take 150 mg by mouth daily.  traZODone (DESYREL) 100 mg tablet Take 100 mg by mouth nightly.  1/2 to 1 every night      atorvastatin (LIPITOR) 40 mg tablet Take 40 mg by mouth nightly.  levothyroxine (SYNTHROID) 112 mcg tablet Take 112 mcg by mouth Daily (before breakfast).  ergocalciferol (VITAMIN D2) 50,000 unit capsule Take 50,000 Units by mouth every seven (7) days.  HYDROmorphone (DILAUDID) 2 mg tablet Take 1 Tab by mouth every four (4) hours as needed for Pain. Max Daily Amount: 12 mg. 15 Tab 0    insulin glargine (LANTUS) 100 unit/mL injection 10 Units by SubCUTAneous route nightly.  insulin lispro (HUMALOG) 100 unit/mL injection 4 Units by SubCUTAneous route Before breakfast, lunch, and dinner.  gabapentin (NEURONTIN) 300 mg capsule Take 300 mg by mouth three (3) times daily.  sertraline (ZOLOFT) 50 mg tablet Take 50 mg by mouth daily.  folic acid (FOLVITE) 1 mg tablet Take 1 Tab by mouth daily. 30 Tab 0    amylase-lipase-protease (CREON) 24,000-76,000 -120,000 unit capsule Take 1 Cap by mouth three (3) times daily (with meals). 80 Cap 0       Past History     Past Medical History:  Past Medical History:   Diagnosis Date    Anxiety     Depression     Hypercholesterolemia     Hypertension     Ill-defined condition     pancreatitis 2015    Liver disease     \"liver failure\" 2015    Long term (current) use of anticoagulants     Thromboembolus ([facility] [address]        Past Surgical History:  Past Surgical History:   Procedure Laterality Date    ABDOMEN SURGERY PROC UNLISTED      Pt stated \"I have no pancreas\"     HX CHOLECYSTECTOMY      HX HYSTERECTOMY      HX SPLENECTOMY         Family History:  Family History   Problem Relation Age of Onset    Cancer Other     Hypertension Other     Cancer Mother     Cancer Maternal Aunt     Cancer Maternal Grandmother        Social History:  Social History     Tobacco Use    Smoking status: Former Smoker     Packs/day: 1.00     Types: Cigarettes   Substance Use Topics    Alcohol use: No     Comment: pt 30 days clean    Drug use:  No Allergies:  No Known Allergies      Review of Systems   Review of Systems   Constitutional: Negative for chills and fever. HENT: Negative for ear pain and sore throat. Eyes: Negative for redness and visual disturbance. Respiratory: Negative for cough and shortness of breath. Cardiovascular: Negative for chest pain and palpitations. Gastrointestinal: Positive for abdominal pain, diarrhea, nausea and vomiting. Negative for blood in stool and constipation. Genitourinary: Negative for dysuria and hematuria. Musculoskeletal: Negative for back pain and gait problem. Skin: Negative for rash and wound. Neurological: Negative for dizziness and headaches. Psychiatric/Behavioral: Negative for behavioral problems and confusion. All other systems reviewed and are negative. Physical Exam   Physical Exam   Constitutional: She is oriented to person, place, and time. She appears well-developed and well-nourished. No distress. HENT:   Head: Normocephalic and atraumatic. Eyes: Pupils are equal, round, and reactive to light. Conjunctivae and EOM are normal.   Neck: Normal range of motion. Neck supple. Cardiovascular: Normal rate, regular rhythm and normal heart sounds. Pulmonary/Chest: Effort normal and breath sounds normal. No respiratory distress. She has no wheezes. She has no rales. Abdominal: Soft. She exhibits no distension. There is tenderness (mild diffuse). There is no rebound and no guarding. Surgical scars noted. Insulin pump and glucose monitor in place. Musculoskeletal: Normal range of motion. Neurological: She is alert and oriented to person, place, and time. She has normal strength. No cranial nerve deficit or sensory deficit. GCS eye subscore is 4. GCS verbal subscore is 5. GCS motor subscore is 6. Skin: Skin is warm and dry. No rash noted. Psychiatric: She has a normal mood and affect. Her behavior is normal.   Nursing note and vitals reviewed.         Diagnostic Study Results     Labs -     Recent Results (from the past 12 hour(s))   METABOLIC PANEL, COMPREHENSIVE    Collection Time: 07/16/19 12:32 PM   Result Value Ref Range    Sodium 138 136 - 145 mmol/L    Potassium 4.1 3.5 - 5.1 mmol/L    Chloride 104 97 - 108 mmol/L    CO2 28 21 - 32 mmol/L    Anion gap 6 5 - 15 mmol/L    Glucose 213 (H) 65 - 100 mg/dL    BUN 12 6 - 20 MG/DL    Creatinine 0.84 0.55 - 1.02 MG/DL    BUN/Creatinine ratio 14 12 - 20      GFR est AA >60 >60 ml/min/1.73m2    GFR est non-AA >60 >60 ml/min/1.73m2    Calcium 9.1 8.5 - 10.1 MG/DL    Bilirubin, total 0.6 0.2 - 1.0 MG/DL    ALT (SGPT) 79 (H) 12 - 78 U/L    AST (SGOT) 72 (H) 15 - 37 U/L    Alk. phosphatase 73 45 - 117 U/L    Protein, total 7.3 6.4 - 8.2 g/dL    Albumin 3.8 3.5 - 5.0 g/dL    Globulin 3.5 2.0 - 4.0 g/dL    A-G Ratio 1.1 1.1 - 2.2     CBC WITH AUTOMATED DIFF    Collection Time: 07/16/19 12:32 PM   Result Value Ref Range    WBC 8.5 3.6 - 11.0 K/uL    RBC 3.78 (L) 3.80 - 5.20 M/uL    HGB 11.7 11.5 - 16.0 g/dL    HCT 33.8 (L) 35.0 - 47.0 %    MCV 89.4 80.0 - 99.0 FL    MCH 31.0 26.0 - 34.0 PG    MCHC 34.6 30.0 - 36.5 g/dL    RDW 17.8 (H) 11.5 - 14.5 %    PLATELET 098 (H) 981 - 400 K/uL    MPV 10.6 8.9 - 12.9 FL    NRBC 0.0 0  WBC    ABSOLUTE NRBC 0.00 0.00 - 0.01 K/uL    NEUTROPHILS 65 32 - 75 %    LYMPHOCYTES 22 12 - 49 %    MONOCYTES 9 5 - 13 %    EOSINOPHILS 3 0 - 7 %    BASOPHILS 1 0 - 1 %    IMMATURE GRANULOCYTES 0 0.0 - 0.5 %    ABS. NEUTROPHILS 5.5 1.8 - 8.0 K/UL    ABS. LYMPHOCYTES 1.9 0.8 - 3.5 K/UL    ABS. MONOCYTES 0.8 0.0 - 1.0 K/UL    ABS. EOSINOPHILS 0.3 0.0 - 0.4 K/UL    ABS. BASOPHILS 0.1 0.0 - 0.1 K/UL    ABS. IMM.  GRANS. 0.0 0.00 - 0.04 K/UL    DF AUTOMATED     LIPASE    Collection Time: 07/16/19 12:32 PM   Result Value Ref Range    Lipase 27 (L) 73 - 393 U/L   URINALYSIS W/ REFLEX CULTURE    Collection Time: 07/16/19  1:52 PM   Result Value Ref Range    Color YELLOW/STRAW      Appearance CLEAR CLEAR Specific gravity 1.025 1.003 - 1.030      pH (UA) 5.5 5.0 - 8.0      Protein NEGATIVE  NEG mg/dL    Glucose 500 (A) NEG mg/dL    Ketone NEGATIVE  NEG mg/dL    Bilirubin NEGATIVE  NEG      Blood NEGATIVE  NEG      Urobilinogen 0.2 0.2 - 1.0 EU/dL    Nitrites NEGATIVE  NEG      Leukocyte Esterase TRACE (A) NEG      WBC 5-10 0 - 4 /hpf    RBC 0-5 0 - 5 /hpf    Epithelial cells MODERATE (A) FEW /lpf    Bacteria NEGATIVE  NEG /hpf    UA:UC IF INDICATED URINE CULTURE ORDERED (A) CNI      CA Oxalate crystals 3+ (A) NEG    Hyaline cast 0-2 0 - 5 /lpf       Radiologic Studies -   No orders to display     CT Results  (Last 48 hours)    None        CXR Results  (Last 48 hours)    None            Medical Decision Making   I am the first provider for this patient. I reviewed the vital signs, available nursing notes, past medical history, past surgical history, family history and social history. Vital Signs-Reviewed the patient's vital signs. Patient Vitals for the past 12 hrs:   Temp Pulse Resp BP SpO2   07/16/19 1221 98.3 °F (36.8 °C) 79 16 (!) 127/97 98 %         Records Reviewed: Nursing Notes and Old Medical Records    Provider Notes (Medical Decision Making):   Pt presents with diffuse abdominal pain with diarrhea and vomiting. She is afebrile and clinically well appearing. She does not have focal tenderness on abdominal exam and no signs of peritonitis. DDx includes gastroenteritis, colitis, C.diff, electrolyte imbalance, dehydration, UTI. Plan for labs, stool culture, and symptomatic treatment. ED Course:   Initial assessment performed. The patients presenting problems have been discussed, and they are in agreement with the care plan formulated and outlined with them. I have encouraged them to ask questions as they arise throughout their visit. ED Course as of Jul 17 0011   Tue Jul 16, 2019   1441 Dr. Juan Easton also evaluated the patient. Plan to treat with oral vancomycin for suspected mild C.diff.  Will order another dose of pain medication as her pain is not controlled after fentanyl.    [LUIS]   1520 Rechecked patient, she is feeling better. Discussed management plan and she feels comfortable with this. [LUIS]      ED Course User Index  [LUIS] Jessica Coleman         Disposition:  3:34 PM    The patient has been re-evaluated and is ready for discharge. Reviewed available results with patient. Counseled patient on diagnosis and care plan. Patient has expressed understanding, and all questions have been answered. Patient agrees with plan and agrees to follow up as recommended, or to return to the ED if their symptoms worsen. Discharge instructions have been provided and explained to the patient, along with reasons to return to the ED. PLAN:  1. Discharge Medication List as of 7/16/2019  3:33 PM      START taking these medications    Details   vancomycin (VANCOCIN) 125 mg capsule Take 1 Cap by mouth four (4) times daily for 7 days. , Normal, Disp-28 Cap, R-0      !! ondansetron (ZOFRAN ODT) 4 mg disintegrating tablet Take 1 Tab by mouth every eight (8) hours as needed for Nausea., Normal, Disp-20 Tab, R-0      HYDROcodone-acetaminophen (NORCO) 5-325 mg per tablet Take 1 Tab by mouth every four (4) hours as needed for Pain for up to 3 days. Max Daily Amount: 6 Tabs., Print, Disp-10 Tab, R-0       !! - Potential duplicate medications found. Please discuss with provider. CONTINUE these medications which have NOT CHANGED    Details   oxyCODONE IR (ROXICODONE) 10 mg tab immediate release tablet Take 1 Tab by mouth every four (4) hours as needed. Max Daily Amount: 60 mg., Print, Disp-20 Tab, R-0      !! ondansetron (ZOFRAN ODT) 4 mg disintegrating tablet Take 1 Tab by mouth every eight (8) hours as needed for Nausea. , Print, Disp-10 Tab, R-0      buPROPion SR (WELLBUTRIN SR) 150 mg SR tablet Take 150 mg by mouth daily. , Historical Med      traZODone (DESYREL) 100 mg tablet Take 100 mg by mouth nightly.  1/2 to 1 every night, Historical Med      atorvastatin (LIPITOR) 40 mg tablet Take 40 mg by mouth nightly., Historical Med      levothyroxine (SYNTHROID) 112 mcg tablet Take 112 mcg by mouth Daily (before breakfast). , Historical Med      ergocalciferol (VITAMIN D2) 50,000 unit capsule Take 50,000 Units by mouth every seven (7) days. , Historical Med      HYDROmorphone (DILAUDID) 2 mg tablet Take 1 Tab by mouth every four (4) hours as needed for Pain. Max Daily Amount: 12 mg., Print, Disp-15 Tab, R-0      insulin glargine (LANTUS) 100 unit/mL injection 10 Units by SubCUTAneous route nightly., Historical Med      insulin lispro (HUMALOG) 100 unit/mL injection 4 Units by SubCUTAneous route Before breakfast, lunch, and dinner., Historical Med      gabapentin (NEURONTIN) 300 mg capsule Take 300 mg by mouth three (3) times daily. , Historical Med      sertraline (ZOLOFT) 50 mg tablet Take 50 mg by mouth daily. , Historical Med      folic acid (FOLVITE) 1 mg tablet Take 1 Tab by mouth daily. , Print, Disp-30 Tab, R-0      amylase-lipase-protease (CREON) 24,000-76,000 -120,000 unit capsule Take 1 Cap by mouth three (3) times daily (with meals). , Print, Disp-90 Cap, R-0       !! - Potential duplicate medications found. Please discuss with provider. 2.   Follow-up Information     Follow up With Specialties Details Why Contact Info    Your GI doctor  Schedule an appointment as soon as possible for a visit in 1 week for a recheck     [facility] EMERGENCY DEPT Emergency Medicine Go to If symptoms worsen [address] [place] [address]  [phone]        Return to ED if worse     Diagnosis     Clinical Impression:   1. Diarrhea of presumed infectious origin            Salomon Ring PA-C        This note will not be viewable in [facility]. 9:47 PM  I was personally available for consultation in the emergency department.   I have reviewed the chart and agree with the documentation recorded by the [facility], including the assessment, treatment plan, and disposition.   Arnulfo Kelly MD

## 2019-07-18 LAB
BACTERIA SPEC CULT: NORMAL
CC UR VC: NORMAL
SERVICE CMNT-IMP: NORMAL

## 2019-10-01 ENCOUNTER — HOSPITAL ENCOUNTER (EMERGENCY)
Age: 44
Discharge: HOME | End: 2019-10-01
Attending: EMERGENCY MEDICINE | Admitting: EMERGENCY MEDICINE
Payer: MEDICARE

## 2019-10-01 VITALS
BODY MASS INDEX: 24.83 KG/M2 | TEMPERATURE: 98.7 F | HEIGHT: 62 IN | DIASTOLIC BLOOD PRESSURE: 67 MMHG | HEART RATE: 89 BPM | OXYGEN SATURATION: 97 % | SYSTOLIC BLOOD PRESSURE: 103 MMHG | WEIGHT: 134.92 LBS | RESPIRATION RATE: 18 BRPM

## 2019-10-01 DIAGNOSIS — R10.84 ABDOMINAL PAIN, GENERALIZED: ICD-10-CM

## 2019-10-01 DIAGNOSIS — R19.7 DIARRHEA, UNSPECIFIED TYPE: Primary | ICD-10-CM

## 2019-10-01 LAB
ALBUMIN SERPL-MCNC: 4 G/DL (ref 3.5–5)
ALBUMIN/GLOB SERPL: 1.1 {RATIO} (ref 1.1–2.2)
ALP SERPL-CCNC: 79 U/L (ref 45–117)
ALT SERPL-CCNC: 47 U/L (ref 12–78)
ANION GAP SERPL CALC-SCNC: 3 MMOL/L (ref 5–15)
APPEARANCE UR: CLEAR
AST SERPL-CCNC: 42 U/L (ref 15–37)
BACTERIA URNS QL MICRO: NEGATIVE /HPF
BASOPHILS # BLD: 0.1 K/UL (ref 0–0.1)
BASOPHILS NFR BLD: 1 % (ref 0–1)
BILIRUB SERPL-MCNC: 0.6 MG/DL (ref 0.2–1)
BILIRUB UR QL: NEGATIVE
BUN SERPL-MCNC: 8 MG/DL (ref 6–20)
BUN/CREAT SERPL: 10 (ref 12–20)
CALCIUM SERPL-MCNC: 9.1 MG/DL (ref 8.5–10.1)
CHLORIDE SERPL-SCNC: 107 MMOL/L (ref 97–108)
CO2 SERPL-SCNC: 30 MMOL/L (ref 21–32)
COLOR UR: ABNORMAL
CREAT SERPL-MCNC: 0.81 MG/DL (ref 0.55–1.02)
CRYPTOSP AG STL QL: NEGATIVE
DIFFERENTIAL METHOD BLD: NORMAL
EOSINOPHIL # BLD: 0.2 K/UL (ref 0–0.4)
EOSINOPHIL NFR BLD: 2 % (ref 0–7)
EPITH CASTS URNS QL MICRO: ABNORMAL /LPF
ERYTHROCYTE [DISTWIDTH] IN BLOOD BY AUTOMATED COUNT: 14.3 % (ref 11.5–14.5)
G LAMBLIA AG STL QL: NEGATIVE
GLOBULIN SER CALC-MCNC: 3.7 G/DL (ref 2–4)
GLUCOSE SERPL-MCNC: 81 MG/DL (ref 65–100)
GLUCOSE UR STRIP.AUTO-MCNC: >1000 MG/DL
HCT VFR BLD AUTO: 39.7 % (ref 35–47)
HGB BLD-MCNC: 13.6 G/DL (ref 11.5–16)
HGB UR QL STRIP: NEGATIVE
HYALINE CASTS URNS QL MICRO: ABNORMAL /LPF (ref 0–5)
IMM GRANULOCYTES # BLD AUTO: 0 K/UL (ref 0–0.04)
IMM GRANULOCYTES NFR BLD AUTO: 0 % (ref 0–0.5)
KETONES UR QL STRIP.AUTO: NEGATIVE MG/DL
LACTATE SERPL-SCNC: 0.9 MMOL/L (ref 0.4–2)
LEUKOCYTE ESTERASE UR QL STRIP.AUTO: NEGATIVE
LIPASE SERPL-CCNC: 14 U/L (ref 73–393)
LYMPHOCYTES # BLD: 2.3 K/UL (ref 0.8–3.5)
LYMPHOCYTES NFR BLD: 23 % (ref 12–49)
MCH RBC QN AUTO: 31.4 PG (ref 26–34)
MCHC RBC AUTO-ENTMCNC: 34.3 G/DL (ref 30–36.5)
MCV RBC AUTO: 91.7 FL (ref 80–99)
MONOCYTES # BLD: 0.8 K/UL (ref 0–1)
MONOCYTES NFR BLD: 8 % (ref 5–13)
NEUTS SEG # BLD: 6.6 K/UL (ref 1.8–8)
NEUTS SEG NFR BLD: 66 % (ref 32–75)
NITRITE UR QL STRIP.AUTO: NEGATIVE
NRBC # BLD: 0 K/UL (ref 0–0.01)
NRBC BLD-RTO: 0 PER 100 WBC
PH UR STRIP: 5 [PH] (ref 5–8)
PLATELET # BLD AUTO: 369 K/UL (ref 150–400)
PMV BLD AUTO: 10.7 FL (ref 8.9–12.9)
POTASSIUM SERPL-SCNC: 4.1 MMOL/L (ref 3.5–5.1)
PROT SERPL-MCNC: 7.7 G/DL (ref 6.4–8.2)
PROT UR STRIP-MCNC: NEGATIVE MG/DL
RBC # BLD AUTO: 4.33 M/UL (ref 3.8–5.2)
RBC #/AREA URNS HPF: ABNORMAL /HPF (ref 0–5)
SODIUM SERPL-SCNC: 140 MMOL/L (ref 136–145)
SP GR UR REFRACTOMETRY: 1.03 (ref 1–1.03)
UA: UC IF INDICATED,UAUC: ABNORMAL
UROBILINOGEN UR QL STRIP.AUTO: 0.2 EU/DL (ref 0.2–1)
WBC # BLD AUTO: 9.9 K/UL (ref 3.6–11)
WBC URNS QL MICRO: ABNORMAL /HPF (ref 0–4)

## 2019-10-01 PROCEDURE — 96376 TX/PRO/DX INJ SAME DRUG ADON: CPT

## 2019-10-01 PROCEDURE — 96375 TX/PRO/DX INJ NEW DRUG ADDON: CPT

## 2019-10-01 PROCEDURE — 83605 ASSAY OF LACTIC ACID: CPT

## 2019-10-01 PROCEDURE — 83690 ASSAY OF LIPASE: CPT

## 2019-10-01 PROCEDURE — 99282 EMERGENCY DEPT VISIT SF MDM: CPT

## 2019-10-01 PROCEDURE — 36415 COLL VENOUS BLD VENIPUNCTURE: CPT

## 2019-10-01 PROCEDURE — 74011250637 HC RX REV CODE- 250/637: Performed by: EMERGENCY MEDICINE

## 2019-10-01 PROCEDURE — 87449 NOS EACH ORGANISM AG IA: CPT

## 2019-10-01 PROCEDURE — 80053 COMPREHEN METABOLIC PANEL: CPT

## 2019-10-01 PROCEDURE — 81001 URINALYSIS AUTO W/SCOPE: CPT

## 2019-10-01 PROCEDURE — 99283 EMERGENCY DEPT VISIT LOW MDM: CPT

## 2019-10-01 PROCEDURE — 85025 COMPLETE CBC W/AUTO DIFF WBC: CPT

## 2019-10-01 PROCEDURE — 89055 LEUKOCYTE ASSESSMENT FECAL: CPT

## 2019-10-01 PROCEDURE — 96374 THER/PROPH/DIAG INJ IV PUSH: CPT

## 2019-10-01 PROCEDURE — 87329 GIARDIA AG IA: CPT

## 2019-10-01 PROCEDURE — 87177 OVA AND PARASITES SMEARS: CPT

## 2019-10-01 PROCEDURE — 74011250636 HC RX REV CODE- 250/636: Performed by: EMERGENCY MEDICINE

## 2019-10-01 PROCEDURE — 96361 HYDRATE IV INFUSION ADD-ON: CPT

## 2019-10-01 RX ORDER — MORPHINE SULFATE 4 MG/ML
4 INJECTION INTRAVENOUS ONCE
Status: COMPLETED | OUTPATIENT
Start: 2019-10-01 | End: 2019-10-01

## 2019-10-01 RX ORDER — OXYCODONE AND ACETAMINOPHEN 5; 325 MG/1; MG/1
1 TABLET ORAL
Qty: 12 TAB | Refills: 0 | Status: SHIPPED | OUTPATIENT
Start: 2019-10-01 | End: 2019-10-04

## 2019-10-01 RX ORDER — KETOROLAC TROMETHAMINE 30 MG/ML
30 INJECTION, SOLUTION INTRAMUSCULAR; INTRAVENOUS
Status: COMPLETED | OUTPATIENT
Start: 2019-10-01 | End: 2019-10-01

## 2019-10-01 RX ORDER — ONDANSETRON 2 MG/ML
4 INJECTION INTRAMUSCULAR; INTRAVENOUS
Status: COMPLETED | OUTPATIENT
Start: 2019-10-01 | End: 2019-10-01

## 2019-10-01 RX ORDER — VANCOMYCIN HYDROCHLORIDE 125 MG/1
125 CAPSULE ORAL 4 TIMES DAILY
Qty: 40 CAP | Refills: 0 | Status: SHIPPED | OUTPATIENT
Start: 2019-10-01 | End: 2019-10-11

## 2019-10-01 RX ORDER — MORPHINE SULFATE 2 MG/ML
2 INJECTION, SOLUTION INTRAMUSCULAR; INTRAVENOUS
Status: COMPLETED | OUTPATIENT
Start: 2019-10-01 | End: 2019-10-01

## 2019-10-01 RX ORDER — DICYCLOMINE HYDROCHLORIDE 10 MG/1
10 CAPSULE ORAL
Status: COMPLETED | OUTPATIENT
Start: 2019-10-01 | End: 2019-10-01

## 2019-10-01 RX ORDER — METRONIDAZOLE 500 MG/1
500 TABLET ORAL 2 TIMES DAILY
Qty: 20 TAB | Refills: 0 | Status: SHIPPED | OUTPATIENT
Start: 2019-10-01 | End: 2019-10-11

## 2019-10-01 RX ADMIN — MORPHINE SULFATE 2 MG: 2 INJECTION, SOLUTION INTRAMUSCULAR; INTRAVENOUS at 17:21

## 2019-10-01 RX ADMIN — DICYCLOMINE HYDROCHLORIDE 10 MG: 10 CAPSULE ORAL at 18:35

## 2019-10-01 RX ADMIN — ONDANSETRON 4 MG: 2 INJECTION INTRAMUSCULAR; INTRAVENOUS at 17:20

## 2019-10-01 RX ADMIN — MORPHINE SULFATE 4 MG: 4 INJECTION, SOLUTION INTRAMUSCULAR; INTRAVENOUS at 18:35

## 2019-10-01 RX ADMIN — SODIUM CHLORIDE 1000 ML: 900 INJECTION, SOLUTION INTRAVENOUS at 17:20

## 2019-10-01 RX ADMIN — KETOROLAC TROMETHAMINE 30 MG: 30 INJECTION, SOLUTION INTRAMUSCULAR at 17:20

## 2019-10-01 NOTE — ED PROVIDER NOTES
EMERGENCY DEPARTMENT HISTORY AND PHYSICAL EXAM      Date: 10/1/2019  Patient Name: Chuyita Ruth    Please note that this dictation was completed with OPHTH[employer]IX, the computer voice recognition software. Quite often unanticipated grammatical, syntax, homophones, and other interpretive errors are inadvertently transcribed by the computer software. Please disregard these errors. Please excuse any errors that have escaped final proofreading. History of Presenting Illness     Chief Complaint   Patient presents with    Abdominal Pain     pt reports abdominal pain, nausea and diarrhea beginning this morning, rates pain 8/10    Nausea    Diarrhea       History Provided By: Patient    HPI: Chuyita Ruth, 38 y.o. female, with past medical history significant for islet cell cancer, C. difficile presenting the emergency department complaining of abdominal pain. Abdominal pain is cramping, severe. Associated with some loose watery diarrhea. She is had multiple similar previous episodes. Diarrhea does not feel like her prior episodes of C. difficile, does not smell like C. difficile her. No fevers or chills. No hematochezia or melena. Admits to nausea but no vomiting. Has tried over-the-counter antidiarrheals with minimal help    PCP: Annabel Valerio MD    No current facility-administered medications on file prior to encounter. Current Outpatient Medications on File Prior to Encounter   Medication Sig Dispense Refill    ondansetron (ZOFRAN ODT) 4 mg disintegrating tablet Take 1 Tab by mouth every eight (8) hours as needed for Nausea. 20 Tab 0    oxyCODONE IR (ROXICODONE) 10 mg tab immediate release tablet Take 1 Tab by mouth every four (4) hours as needed. Max Daily Amount: 60 mg. 20 Tab 0    ondansetron (ZOFRAN ODT) 4 mg disintegrating tablet Take 1 Tab by mouth every eight (8) hours as needed for Nausea. 10 Tab 0    buPROPion SR (WELLBUTRIN SR) 150 mg SR tablet Take 150 mg by mouth daily.       traZODone (DESYREL) 100 mg tablet Take 100 mg by mouth nightly. 1/2 to 1 every night      atorvastatin (LIPITOR) 40 mg tablet Take 40 mg by mouth nightly.  levothyroxine (SYNTHROID) 112 mcg tablet Take 112 mcg by mouth Daily (before breakfast).  ergocalciferol (VITAMIN D2) 50,000 unit capsule Take 50,000 Units by mouth every seven (7) days.  HYDROmorphone (DILAUDID) 2 mg tablet Take 1 Tab by mouth every four (4) hours as needed for Pain. Max Daily Amount: 12 mg. 15 Tab 0    insulin glargine (LANTUS) 100 unit/mL injection 10 Units by SubCUTAneous route nightly.  insulin lispro (HUMALOG) 100 unit/mL injection 4 Units by SubCUTAneous route Before breakfast, lunch, and dinner.  gabapentin (NEURONTIN) 300 mg capsule Take 300 mg by mouth three (3) times daily.  sertraline (ZOLOFT) 50 mg tablet Take 50 mg by mouth daily.  folic acid (FOLVITE) 1 mg tablet Take 1 Tab by mouth daily. 30 Tab 0    amylase-lipase-protease (CREON) 24,000-76,000 -120,000 unit capsule Take 1 Cap by mouth three (3) times daily (with meals).  80 Cap 0       Past History     Past Medical History:  Past Medical History:   Diagnosis Date    Anxiety     Depression     Hypercholesterolemia     Hypertension     Ill-defined condition     pancreatitis 2015    Liver disease     \"liver failure\" 2015    Long term (current) use of anticoagulants     Thromboembolus ([facility] [address]        Past Surgical History:  Past Surgical History:   Procedure Laterality Date    ABDOMEN SURGERY PROC UNLISTED      Pt stated \"I have no pancreas\"     HX CHOLECYSTECTOMY      HX HYSTERECTOMY      HX SPLENECTOMY         Family History:  Family History   Problem Relation Age of Onset    Cancer Other     Hypertension Other     Cancer Mother     Cancer Maternal Aunt     Cancer Maternal Grandmother        Social History:  Social History     Tobacco Use    Smoking status: Former Smoker     Packs/day: 1.00     Types: Cigarettes   Substance Use Topics    Alcohol use: No     Comment: pt 30 days clean    Drug use: No       Allergies:  No Known Allergies      Review of Systems   Review of Systems   Constitutional: Negative for chills and fever. HENT: Negative for congestion and sore throat. Eyes: Negative for visual disturbance. Respiratory: Negative for cough and shortness of breath. Cardiovascular: Negative for chest pain and leg swelling. Gastrointestinal: Positive for abdominal pain, diarrhea and nausea. Negative for blood in stool and vomiting. Endocrine: Negative for polyuria. Genitourinary: Negative for dysuria, flank pain, vaginal bleeding and vaginal discharge. Musculoskeletal: Negative for myalgias. Skin: Negative for rash. Allergic/Immunologic: Negative for immunocompromised state. Neurological: Negative for weakness and headaches. Psychiatric/Behavioral: Negative for confusion. Physical Exam   Physical Exam   Constitutional: She is oriented to person, place, and time. She appears well-developed and well-nourished. HENT:   Head: Normocephalic and atraumatic. Moist mucous membranes   Eyes: Pupils are equal, round, and reactive to light. Conjunctivae are normal. Right eye exhibits no discharge. Left eye exhibits no discharge. Neck: Normal range of motion. Neck supple. No tracheal deviation present. Cardiovascular: Normal rate, regular rhythm and normal heart sounds. No murmur heard. Pulmonary/Chest: Effort normal and breath sounds normal. No respiratory distress. She has no wheezes. She has no rales. Abdominal: Soft. Bowel sounds are normal. There is tenderness (mild). There is no rebound and no guarding. Musculoskeletal: Normal range of motion. She exhibits no edema, tenderness or deformity. Neurological: She is alert and oriented to person, place, and time. Skin: Skin is warm and dry. No rash noted. No erythema.    Psychiatric: Her behavior is normal.   Nursing note and vitals reviewed. Diagnostic Study Results     Labs -     No results found for this or any previous visit (from the past 12 hour(s)). Radiologic Studies -   No orders to display     CT Results  (Last 48 hours)    None        CXR Results  (Last 48 hours)    None            Medical Decision Making   I am the first provider for this patient. I reviewed the vital signs, available nursing notes, past medical history, past surgical history, family history and social history. Vital Signs-Reviewed the patient's vital signs. No data found. Records Reviewed: Nursing Notes and Old Medical Records    Provider Notes (Medical Decision Making):   Overall nontoxic, well-appearing female. Abdominal exam is benign, doubt acute abdomen. She does not warrant a CT at this time, has likely had multiple CAT scans given multiple visits for abdominal pain to [facility]. Patient requested pain medicine, stating that her GI doctor usually recommends that she get something for pain. I did review the , her most recent opiate prescription was from July we will use a small dose of opiates here, will try to avoid opiates at home, but may give a very short course, low dosage as needed. ED Course:   Initial assessment performed. The patients presenting problems have been discussed, and they are in agreement with the care plan formulated and outlined with them. I have encouraged them to ask questions as they arise throughout their visit. ED Course as of Oct 03 1039   Tue Oct 01, 2019   [id number] Patient reports still having pain, she appears well, nontoxic. Her labs are reassuring, will treat empirically for C. difficile given her watery diarrhea. She looks overall well, nontoxic, has normal labs. I think she can be treated at home with opiates, antidiarrheals and antibiotics.     [AR]      ED Course User Index  [AR] Azeem Hodge DO         Critical Care Time:   none    Disposition:  DISCHARGE NOTE  Patients results have been reviewed with them. Patient and/or family have verbally conveyed their understanding and agreement of the patient's signs, symptoms, diagnosis, treatment and prognosis and additionally agree to follow up as recommended or return to the Emergency Room should their condition change or have any new concerns prior to their follow-up appointment. Patient verbally agrees with the care-plan and verbally conveys that all of their questions have been answered. Discharge instructions have also been provided to the patient with some educational information regarding their diagnosis as well a list of reasons why they would want to return to the ER prior to their follow-up appointment should their condition change. PLAN:  1. Discharge Medication List as of 10/1/2019  6:53 PM      START taking these medications    Details   vancomycin (VANCOCIN) 125 mg capsule Take 1 Cap by mouth four (4) times daily for 10 days. , Print, Disp-40 Cap, R-0      metroNIDAZOLE (FLAGYL) 500 mg tablet Take 1 Tab by mouth two (2) times a day for 10 days. , Print, Disp-20 Tab, R-0      oxyCODONE-acetaminophen (PERCOCET) 5-325 mg per tablet Take 1 Tab by mouth every six (6) hours as needed for Pain for up to 3 days. Max Daily Amount: 4 Tabs., Print, Disp-12 Tab, R-0         CONTINUE these medications which have NOT CHANGED    Details   !! ondansetron (ZOFRAN ODT) 4 mg disintegrating tablet Take 1 Tab by mouth every eight (8) hours as needed for Nausea., Normal, Disp-20 Tab, R-0      oxyCODONE IR (ROXICODONE) 10 mg tab immediate release tablet Take 1 Tab by mouth every four (4) hours as needed. Max Daily Amount: 60 mg., Print, Disp-20 Tab, R-0      !! ondansetron (ZOFRAN ODT) 4 mg disintegrating tablet Take 1 Tab by mouth every eight (8) hours as needed for Nausea. , Print, Disp-10 Tab, R-0      buPROPion SR (WELLBUTRIN SR) 150 mg SR tablet Take 150 mg by mouth daily. , Historical Med      traZODone (DESYREL) 100 mg tablet Take 100 mg by mouth nightly.  1/2 to 1 every night, Historical Med      atorvastatin (LIPITOR) 40 mg tablet Take 40 mg by mouth nightly., Historical Med      levothyroxine (SYNTHROID) 112 mcg tablet Take 112 mcg by mouth Daily (before breakfast). , Historical Med      ergocalciferol (VITAMIN D2) 50,000 unit capsule Take 50,000 Units by mouth every seven (7) days. , Historical Med      HYDROmorphone (DILAUDID) 2 mg tablet Take 1 Tab by mouth every four (4) hours as needed for Pain. Max Daily Amount: 12 mg., Print, Disp-15 Tab, R-0      insulin glargine (LANTUS) 100 unit/mL injection 10 Units by SubCUTAneous route nightly., Historical Med      insulin lispro (HUMALOG) 100 unit/mL injection 4 Units by SubCUTAneous route Before breakfast, lunch, and dinner., Historical Med      gabapentin (NEURONTIN) 300 mg capsule Take 300 mg by mouth three (3) times daily. , Historical Med      sertraline (ZOLOFT) 50 mg tablet Take 50 mg by mouth daily. , Historical Med      folic acid (FOLVITE) 1 mg tablet Take 1 Tab by mouth daily. , Print, Disp-30 Tab, R-0      amylase-lipase-protease (CREON) 24,000-76,000 -120,000 unit capsule Take 1 Cap by mouth three (3) times daily (with meals). , Print, Disp-90 Cap, R-0       !! - Potential duplicate medications found. Please discuss with provider. 2.   Follow-up Information     Follow up With Specialties Details Why Contact Fortino Valderrama MD Family Practice Schedule an appointment as soon as possible for a visit  [address] [place] [address] [id number] EMERGENCY DEPT Emergency Medicine  If symptoms worsen [address] [place] [address]  [address] [place] [address]  [phone]    Your GI DOCTOR  Schedule an appointment as soon as possible for a visit in 1 day Call tomorrow for their next available appointment. Return to ED if worse     Diagnosis     Clinical Impression:   1. Diarrhea, unspecified type    2. Abdominal pain, generalized        Attestations:   This note was completed by Gale Solo Timur Walker

## 2019-10-01 NOTE — ED NOTES
Pt to ED with c/o abdominal pain, nausea and diarrhea beginning this morning, rates pain 8/10. A/Ox4. Monitor x2.  Call bell in reach

## 2019-10-01 NOTE — DISCHARGE INSTRUCTIONS

## 2019-10-02 LAB
C DIFF GDH STL QL: NEGATIVE
C DIFF TOX A+B STL QL IA: NEGATIVE
INTERPRETATION: NORMAL

## 2019-10-03 LAB — WBC #/AREA STL HPF: NORMAL /HPF (ref 0–4)

## 2019-10-07 LAB
O+P SPEC MICRO: NORMAL
O+P STL CONC: NORMAL
SPECIMEN SOURCE: NORMAL

## 2021-06-02 ENCOUNTER — CLINICAL SUPPORT (OUTPATIENT)
Dept: DIABETES SERVICES | Age: 46
End: 2021-06-02
Payer: COMMERCIAL

## 2021-06-02 DIAGNOSIS — E10.9 TYPE 1 DIABETES MELLITUS WITHOUT COMPLICATION (HCC): Primary | ICD-10-CM

## 2021-06-02 PROCEDURE — 97802 MEDICAL NUTRITION INDIV IN: CPT | Performed by: DIETITIAN, REGISTERED

## 2021-06-02 NOTE — PROGRESS NOTES
[facility] Diabetes Health  Diabetes Self-Management Education & Support Program  Initial Nutrition Assessment      Patient's Name: Lynn Blanca  Date: 6/2/2021  Reason for Referral: MNT type 1 DM with gastroparesis. Referral Source: Del Martin MD    Nutrition Assessment:  Pertinent Medical History:  Past Medical History:   Diagnosis Date    Anxiety     Depression     Hypercholesterolemia     Hypertension     Ill-defined condition     pancreatitis 2015    Liver disease     \"liver failure\" 2015    Long term (current) use of anticoagulants     Thromboembolus ([address]      Past Surgical History:   Procedure Laterality Date    ABDOMEN SURGERY PROC UNLISTED      Pt stated \"I have no pancreas\"     HX CHOLECYSTECTOMY      HX HYSTERECTOMY      HX SPLENECTOMY       Patient Active Problem List   Diagnosis Code    Acute pancreatitis K85.90    Family hx of ovarian malignancy Z80.41    Family hx-breast malignancy Z80.3    Breast swelling N63.0    Necrotizing pancreatitis K85.91    Pancreatic pseudocyst K86.3       Pertinent Medications/Supplements:  Prior to Admission medications    Medication Sig Start Date End Date Taking? Authorizing Provider   ondansetron (ZOFRAN ODT) 4 mg disintegrating tablet Take 1 Tab by mouth every eight (8) hours as needed for Nausea. 7/16/19   Liana Ring PA   oxyCODONE IR (ROXICODONE) 10 mg tab immediate release tablet Take 1 Tab by mouth every four (4) hours as needed. Max Daily Amount: 60 mg. 7/26/17   Stacey Nicole, DO   ondansetron (ZOFRAN ODT) 4 mg disintegrating tablet Take 1 Tab by mouth every eight (8) hours as needed for Nausea. 7/26/17   Stacey Nicole, DO   buPROPion SR [facility] SR) 150 mg SR tablet Take 150 mg by mouth daily. Kenzie Mora MD   traZODone (DESYREL) 100 mg tablet Take 100 mg by mouth nightly. 1/2 to 1 every night    Kenzie Mora MD   atorvastatin (LIPITOR) 40 mg tablet Take 40 mg by mouth nightly.     Kenzie Mora MD levothyroxine (SYNTHROID) 112 mcg tablet Take 112 mcg by mouth Daily (before breakfast). Kenzie Mora MD   ergocalciferol (VITAMIN D2) 50,000 unit capsule Take 50,000 Units by mouth every seven (7) days. Kenzie Mora MD   HYDROmorphone (DILAUDID) 2 mg tablet Take 1 Tab by mouth every four (4) hours as needed for Pain. Max Daily Amount: 12 mg. 10/15/16   Leigh Carrel, DO   insulin glargine (LANTUS) 100 unit/mL injection 10 Units by SubCUTAneous route nightly. Kenzie Mora MD   insulin lispro (HUMALOG) 100 unit/mL injection 4 Units by SubCUTAneous route Before breakfast, lunch, and dinner. Kenzie Mora MD   gabapentin (NEURONTIN) 300 mg capsule Take 300 mg by mouth three (3) times daily. Kenzie Mora MD   sertraline (ZOLOFT) 50 mg tablet Take 50 mg by mouth daily. Provider, Siva   folic acid (FOLVITE) 1 mg tablet Take 1 Tab by mouth daily. 3/26/15   Onofre Godoy MD   amylase-lipase-protease (CREON) 24,000-76,000 -120,000 unit capsule Take 1 Cap by mouth three (3) times daily (with meals). 3/26/15   Onofre Godoy MD       Vitamin/mineral supplements: Vitamin C and [place] probiotics? . FeSO4    Tandem pump settings:   Settings program today are as follows for personal profiles:    Timed Settings Basal   unit/hour Correction Factor   1 unit: mg/dl Carb Ratio   1 unit: gram Target BG  1 unit: mg/dl   12:00 am  0.44  62  9  110   3:00 am  0.6  62  9  110    8:00 am  0.5  62  9   110   10:00 am  0.44  62   9   110   8:00 pm  0.5  62  9   110        Pertinent Biochemical Data:  Lab Results   Component Value Date/Time    Sodium 140 10/01/2019 01:47 PM    Potassium 4.1 10/01/2019 01:47 PM    Chloride 107 10/01/2019 01:47 PM    CO2 30 10/01/2019 01:47 PM    Anion gap 3 (L) 10/01/2019 01:47 PM    Glucose 81 10/01/2019 01:47 PM    BUN 8 10/01/2019 01:47 PM    Creatinine 0.81 10/01/2019 01:47 PM    BUN/Creatinine ratio 10 (L) 10/01/2019 01:47 PM    GFR est AA >60 10/01/2019 01:47 PM GFR est non-AA >60 10/01/2019 01:47 PM    Calcium 9.1 10/01/2019 01:47 PM    Bilirubin, total 0.6 10/01/2019 01:47 PM    Alk. phosphatase 79 10/01/2019 01:47 PM    Protein, total 7.7 10/01/2019 01:47 PM    Albumin 4.0 10/01/2019 01:47 PM    Globulin 3.7 10/01/2019 01:47 PM    A-G Ratio 1.1 10/01/2019 01:47 PM    ALT (SGPT) 47 10/01/2019 01:47 PM    AST (SGOT) 42 (H) 10/01/2019 01:47 PM       Hemoglobin A1c: \"around 9%\". Home blood glucose records:  Fasting- 160 mg/dL  Anthropometrics:  Height:   Ht Readings from Last 1 Encounters:   10/01/19 5' 2\" (1.575 m)     Weight:   Wt Readings from Last 1 Encounters:   10/01/19 134 lb 14.7 oz (61.2 kg)        Food- and nutrition-related history:  Pt's perceptions, values, motivation, barriers r/t nutrition problem: Kendell Tsai shared that she is managing her Type 1 DM using Tandem t:slimx2 with Control IQ. Shared that she has been running Exercise Activity all the time - instructed to turn on 30 minutes prior to her volunteering times (if quiet day, turn off when you get your assignment, if active day, keep running until you are home) and also how to use the Extended bolus feature to address her gastroparesis management (will permit her to cancel undelivered bolus if needed and permit delivery of some insulin to deal with her liquid meal components and then, when solids transition. Shared with Kendell Tsai to try and see if she can assess the duration of emptying. She identified need for gastroparesis guidelines. Prior education with RDN: at [facility] when last had her surgery for gastroparesis guidelines. Current or previous diets: trying to follow gastroparesis guidelines but uncertain. Eating raw veg and fruit/does not tolerate solids towards evening and more tolerance with breakfast.  Food allergies: none  Eating environment/psychosocial/support: family is supportive, needs to makes sure she has working appliance.  Shared that she is willing to maintain food diary (left her notes at home today). Knowledge/beliefs/attitudes about food/nutrition/health/Gastrointestinal: \"I know my GI system is not working well - reports stools have been pasty/tristen consistency with dark green to black in color: for colonscopy on 6/22/21. Is consistent with taking her Creon, using probiotic daily - but will switch brands (started new brand about 2 weeks ago). Reports limited knowledge to calculate carbs for foods she is consuming - is using nutrition facts information. 24-hour recall:  Breakfast: MG Cheerios with almond milk - ~1 cup and unknown almond milk. Lunch: chicken noodle soup from [employer] (either [employer] or canned options)  Dinner: was using [employer] but is now returning to making meals at home. Crispy chicken tacos with brussel sprout slaw (dressing made with sour cream and dill seasoning). Snacks: poptart   Beverages: did not assess. Activity level: Light activity    Estimated Nutrition Needs: not assessed at this visit due to need for education regarding gastroparesis guidelines. Nutrition Diagnosis: (6/2/2021): Food and nutrition-related knowledge deficit related to gastroparesis as evidenced by diet recall. Nutrition Interventions:  1) try to use hypoglycemia treatment that you can portion control, suggested to use 1/2 the typical treatment (juice in large volumes precipitates auto bolus from Control IQ). 2) consider focusing on solids in am and transition to more liquid/puree towards evening given reported tolerances. 3) consider liquid pediatric multivitamin - black stools could be related to the iron supplement that was started or the new probiotic. 4) if using probiotic, using consistent brand each time due to your limited gut mobility. 5) low fiber guidelines provided - consider ground/mashed textures (may need strained fruit and vegetables and no more that mashed consistency). Typically, raw fruit/veg are too hard to transition out of stomach.    6) begin using vanilla Greek yogurt and consider making smoothies/puree soups for evening meals - may benefit from the nutrient density of cow's milk and versatility for using in food preparation. 7) solids as tolerated and dependent on time of day - may also find more tolerated on days more physically active. 8) downloaded Calorie Pamela Wallace and encouraged to use pilar to estimate foods she is preparing at home. (we can also do together)    Patient Goals: to improve tolerance to diet and blood glucose management. Resources Provided/Reviewed: Gastroparesis guidelines  Information Reviewed with: Patient Pt was receptive and exhibited understanding of the material presented    Nutrition Monitoring/Evaluation:  (6/2/2021): diet recall    MNT Follow-up Visit: 6/29/21     Pt was seen in person for a face to face visit today. --Adam Alex RD, [facility] on 6/2/2021 at 8:42 AM    An electronic signature was used to authenticate this note.  I was in the office for the appointment and time spent: 65 minutes

## 2021-06-02 NOTE — PATIENT INSTRUCTIONS
1) when treating low BG values, use glucose tablets and use 2 for treatment - have to wait 10 minutes to see if sensor is changing (arrow direction or SG value) 2) if you having low BG with your current settings - may need changes to your pump settings. 3) avoid high fiber foods.

## 2021-07-09 ENCOUNTER — OFFICE VISIT (OUTPATIENT)
Dept: URGENT CARE | Age: 46
End: 2021-07-09
Payer: COMMERCIAL

## 2021-07-09 VITALS
DIASTOLIC BLOOD PRESSURE: 81 MMHG | OXYGEN SATURATION: 99 % | TEMPERATURE: 98.2 F | SYSTOLIC BLOOD PRESSURE: 123 MMHG | WEIGHT: 149.1 LBS | BODY MASS INDEX: 27.44 KG/M2 | HEART RATE: 80 BPM | RESPIRATION RATE: 16 BRPM | HEIGHT: 62 IN

## 2021-07-09 DIAGNOSIS — R10.9 LEFT SIDED ABDOMINAL PAIN: Primary | ICD-10-CM

## 2021-07-09 DIAGNOSIS — R11.10 VOMITING, INTRACTABILITY OF VOMITING NOT SPECIFIED, PRESENCE OF NAUSEA NOT SPECIFIED, UNSPECIFIED VOMITING TYPE: ICD-10-CM

## 2021-07-09 DIAGNOSIS — Z98.890 S/P COLONOSCOPY: ICD-10-CM

## 2021-07-09 DIAGNOSIS — Z87.19 HISTORY OF SMALL BOWEL OBSTRUCTION: ICD-10-CM

## 2021-07-09 DIAGNOSIS — R19.8 ABDOMINAL FULLNESS: ICD-10-CM

## 2021-07-09 PROCEDURE — 99203 OFFICE O/P NEW LOW 30 MIN: CPT | Performed by: NURSE PRACTITIONER

## 2021-07-09 NOTE — PROGRESS NOTES
50 yo female here for abdominal pain s/p colonoscopy 06/22/2021  Noted 3 days of not producing bowel movement  Promotes known current small bowel obstruction advised to watch by GI specialist  She however has been experiencing increase in worsening pain; predominantly left side of abdomen. 7/10 constant with new vomiting today. GI specialist with [facility].    She has complicated GI hx significant for: necrotizing pancreatitis s/p pancreatectomy with secondary type I diabetes, bowel obstruction           Past Medical History:   Diagnosis Date    Anxiety     Depression     Hypercholesterolemia     Hypertension     Ill-defined condition     pancreatitis 2015    Liver disease     \"liver failure\" 2015    Long term (current) use of anticoagulants     Thromboembolus (HCC)         Past Surgical History:   Procedure Laterality Date    HX CHOLECYSTECTOMY      HX HYSTERECTOMY      HX SPLENECTOMY      HI ABDOMEN SURGERY PROC UNLISTED      Pt stated \"I have no pancreas\"          Family History   Problem Relation Age of Onset    Cancer Other     Hypertension Other     Cancer Mother     Cancer Maternal Aunt     Cancer Maternal Grandmother         Social History     Socioeconomic History    Marital status:      Spouse name: Not on file    Number of children: Not on file    Years of education: Not on file    Highest education level: Not on file   Occupational History    Not on file   Tobacco Use    Smoking status: Former Smoker     Packs/day: 1.00     Types: Cigarettes   Substance and Sexual Activity    Alcohol use: No     Comment: pt 30 days clean    Drug use: No    Sexual activity: Yes     Partners: Male     Birth control/protection: Pill   Other Topics Concern    Not on file   Social History Narrative    Not on file     Social Determinants of Health     Financial Resource Strain:     Difficulty of Paying Living Expenses:    Food Insecurity:     Worried About Running Out of Food in the Last Year:  Ran Out of Food in the Last Year:    Transportation Needs:     Lack of Transportation (Medical):  Lack of Transportation (Non-Medical):    Physical Activity:     Days of Exercise per Week:     Minutes of Exercise per Session:    Stress:     Feeling of Stress :    Social Connections:     Frequency of Communication with Friends and Family:     Frequency of Social Gatherings with Friends and Family:     Attends [employer] Services:     Active Member of Clubs or Organizations:     Attends Club or Organization Meetings:     Marital Status:    Intimate Partner Violence:     Fear of Current or Ex-Partner:     Emotionally Abused:     Physically Abused:     Sexually Abused: ALLERGIES: Patient has no known allergies. Review of Systems   Constitutional: Negative for chills and fever. Neurological: Negative for dizziness. All other systems reviewed and are negative. Vitals:    07/09/21 1747   BP: 123/81   Pulse: 80   Resp: 16   Temp: 98.2 °F (36.8 °C)   SpO2: 99%   Weight: 149 lb 1.6 oz (67.6 kg)   Height: 5' 2\" (1.575 m)       Physical Exam  Constitutional:       General: She is not in acute distress. Appearance: She is not diaphoretic. HENT:      Mouth/Throat:      Mouth: Mucous membranes are moist.   Eyes:      Extraocular Movements: Extraocular movements intact. Pupils: Pupils are equal, round, and reactive to light. Cardiovascular:      Rate and Rhythm: Normal rate and regular rhythm. Pulmonary:      Effort: Pulmonary effort is normal.      Breath sounds: Normal breath sounds. Abdominal:      General: Bowel sounds are normal.      Tenderness: There is abdominal tenderness. Skin:     Coloration: Skin is not pale. Findings: No rash. Neurological:      Mental Status: She is alert and oriented to person, place, and time. Psychiatric:         Mood and Affect: Mood normal.         Behavior: Behavior normal.         Thought Content:  Thought content normal.      Comments: Pleasant mood. MDM     Differential Diagnosis; Clinical Impression; Plan:       CLINICAL IMPRESSION:  (R10.9) Left sided abdominal pain  (primary encounter diagnosis)  (R19.8) Abdominal fullness  (R11.10) Vomiting, intractability of vomiting not specified, presence of nausea not specified, unspecified vomiting type  (Z98.890) S/P colonoscopy  (Z87.19) History of small bowel obstruction      Plan:  Concern for worsening bowel obstruction given progressively worsening abdominal pain and vomiting with known history of SBO. Referred to ED for immediate evaluation and management  Likely going to [facility] as her specialist are located there.                   Procedures

## 2021-07-10 ENCOUNTER — APPOINTMENT (OUTPATIENT)
Dept: CT IMAGING | Age: 46
End: 2021-07-10
Attending: EMERGENCY MEDICINE
Payer: COMMERCIAL

## 2021-07-10 ENCOUNTER — HOSPITAL ENCOUNTER (EMERGENCY)
Age: 46
Discharge: HOME | End: 2021-07-10
Attending: EMERGENCY MEDICINE
Payer: COMMERCIAL

## 2021-07-10 VITALS
OXYGEN SATURATION: 97 % | HEART RATE: 80 BPM | DIASTOLIC BLOOD PRESSURE: 74 MMHG | SYSTOLIC BLOOD PRESSURE: 113 MMHG | BODY MASS INDEX: 27.26 KG/M2 | WEIGHT: 148.15 LBS | HEIGHT: 62 IN | RESPIRATION RATE: 18 BRPM | TEMPERATURE: 98.6 F

## 2021-07-10 DIAGNOSIS — R11.10 ACUTE VOMITING: ICD-10-CM

## 2021-07-10 DIAGNOSIS — N28.1 CYST OF RIGHT KIDNEY: ICD-10-CM

## 2021-07-10 DIAGNOSIS — R10.9 ACUTE ABDOMINAL PAIN: Primary | ICD-10-CM

## 2021-07-10 LAB
ALBUMIN SERPL-MCNC: 3.9 G/DL (ref 3.5–5)
ALBUMIN/GLOB SERPL: 1.1 {RATIO} (ref 1.1–2.2)
ALP SERPL-CCNC: 77 U/L (ref 45–117)
ALT SERPL-CCNC: 56 U/L (ref 12–78)
ANION GAP SERPL CALC-SCNC: 2 MMOL/L (ref 5–15)
APPEARANCE UR: CLEAR
AST SERPL-CCNC: 27 U/L (ref 15–37)
BACTERIA URNS QL MICRO: NEGATIVE /HPF
BASOPHILS # BLD: 0.1 K/UL (ref 0–0.1)
BASOPHILS NFR BLD: 1 % (ref 0–1)
BILIRUB SERPL-MCNC: 0.6 MG/DL (ref 0.2–1)
BILIRUB UR QL: NEGATIVE
BUN SERPL-MCNC: 9 MG/DL (ref 6–20)
BUN/CREAT SERPL: 13 (ref 12–20)
CALCIUM SERPL-MCNC: 9.1 MG/DL (ref 8.5–10.1)
CHLORIDE SERPL-SCNC: 104 MMOL/L (ref 97–108)
CO2 SERPL-SCNC: 32 MMOL/L (ref 21–32)
COLOR UR: ABNORMAL
CREAT SERPL-MCNC: 0.67 MG/DL (ref 0.55–1.02)
DIFFERENTIAL METHOD BLD: NORMAL
EOSINOPHIL # BLD: 0.4 K/UL (ref 0–0.4)
EOSINOPHIL NFR BLD: 4 % (ref 0–7)
EPITH CASTS URNS QL MICRO: ABNORMAL /LPF
ERYTHROCYTE [DISTWIDTH] IN BLOOD BY AUTOMATED COUNT: 13 % (ref 11.5–14.5)
GLOBULIN SER CALC-MCNC: 3.4 G/DL (ref 2–4)
GLUCOSE SERPL-MCNC: 140 MG/DL (ref 65–100)
GLUCOSE UR STRIP.AUTO-MCNC: NEGATIVE MG/DL
HCT VFR BLD AUTO: 37.2 % (ref 35–47)
HGB BLD-MCNC: 12.9 G/DL (ref 11.5–16)
HGB UR QL STRIP: NEGATIVE
IMM GRANULOCYTES # BLD AUTO: 0 K/UL (ref 0–0.04)
IMM GRANULOCYTES NFR BLD AUTO: 0 % (ref 0–0.5)
KETONES UR QL STRIP.AUTO: NEGATIVE MG/DL
LEUKOCYTE ESTERASE UR QL STRIP.AUTO: ABNORMAL
LIPASE SERPL-CCNC: 13 U/L (ref 73–393)
LYMPHOCYTES # BLD: 3.3 K/UL (ref 0.8–3.5)
LYMPHOCYTES NFR BLD: 34 % (ref 12–49)
MCH RBC QN AUTO: 32.7 PG (ref 26–34)
MCHC RBC AUTO-ENTMCNC: 34.7 G/DL (ref 30–36.5)
MCV RBC AUTO: 94.2 FL (ref 80–99)
MONOCYTES # BLD: 1 K/UL (ref 0–1)
MONOCYTES NFR BLD: 10 % (ref 5–13)
NEUTS SEG # BLD: 5 K/UL (ref 1.8–8)
NEUTS SEG NFR BLD: 51 % (ref 32–75)
NITRITE UR QL STRIP.AUTO: NEGATIVE
NRBC # BLD: 0 K/UL (ref 0–0.01)
NRBC BLD-RTO: 0 PER 100 WBC
PH UR STRIP: 7 [PH] (ref 5–8)
PLATELET # BLD AUTO: 396 K/UL (ref 150–400)
PMV BLD AUTO: 10 FL (ref 8.9–12.9)
POTASSIUM SERPL-SCNC: 3.6 MMOL/L (ref 3.5–5.1)
PROT SERPL-MCNC: 7.3 G/DL (ref 6.4–8.2)
PROT UR STRIP-MCNC: NEGATIVE MG/DL
RBC # BLD AUTO: 3.95 M/UL (ref 3.8–5.2)
RBC #/AREA URNS HPF: ABNORMAL /HPF (ref 0–5)
SODIUM SERPL-SCNC: 138 MMOL/L (ref 136–145)
SP GR UR REFRACTOMETRY: 1.02 (ref 1–1.03)
UA: UC IF INDICATED,UAUC: ABNORMAL
UROBILINOGEN UR QL STRIP.AUTO: 1 EU/DL (ref 0.2–1)
WBC # BLD AUTO: 9.8 K/UL (ref 3.6–11)
WBC URNS QL MICRO: ABNORMAL /HPF (ref 0–4)

## 2021-07-10 PROCEDURE — 96374 THER/PROPH/DIAG INJ IV PUSH: CPT

## 2021-07-10 PROCEDURE — 36415 COLL VENOUS BLD VENIPUNCTURE: CPT

## 2021-07-10 PROCEDURE — 74011000636 HC RX REV CODE- 636: Performed by: EMERGENCY MEDICINE

## 2021-07-10 PROCEDURE — 74011250636 HC RX REV CODE- 250/636: Performed by: EMERGENCY MEDICINE

## 2021-07-10 PROCEDURE — 99284 EMERGENCY DEPT VISIT MOD MDM: CPT

## 2021-07-10 PROCEDURE — 81001 URINALYSIS AUTO W/SCOPE: CPT

## 2021-07-10 PROCEDURE — 80053 COMPREHEN METABOLIC PANEL: CPT

## 2021-07-10 PROCEDURE — 83690 ASSAY OF LIPASE: CPT

## 2021-07-10 PROCEDURE — 74177 CT ABD & PELVIS W/CONTRAST: CPT

## 2021-07-10 PROCEDURE — 96375 TX/PRO/DX INJ NEW DRUG ADDON: CPT

## 2021-07-10 PROCEDURE — 96361 HYDRATE IV INFUSION ADD-ON: CPT

## 2021-07-10 PROCEDURE — 74011000250 HC RX REV CODE- 250: Performed by: EMERGENCY MEDICINE

## 2021-07-10 PROCEDURE — 85025 COMPLETE CBC W/AUTO DIFF WBC: CPT

## 2021-07-10 RX ORDER — DICYCLOMINE HYDROCHLORIDE 20 MG/1
20 TABLET ORAL EVERY 6 HOURS
Qty: 20 TABLET | Refills: 0 | OUTPATIENT
Start: 2021-07-10 | End: 2021-09-15

## 2021-07-10 RX ORDER — MORPHINE SULFATE 2 MG/ML
4 INJECTION, SOLUTION INTRAMUSCULAR; INTRAVENOUS ONCE
Status: COMPLETED | OUTPATIENT
Start: 2021-07-10 | End: 2021-07-10

## 2021-07-10 RX ORDER — TRAMADOL HYDROCHLORIDE 50 MG/1
50 TABLET ORAL
Qty: 18 TABLET | Refills: 0 | Status: SHIPPED | OUTPATIENT
Start: 2021-07-10 | End: 2021-07-13

## 2021-07-10 RX ORDER — FENTANYL CITRATE 50 UG/ML
50 INJECTION, SOLUTION INTRAMUSCULAR; INTRAVENOUS AS NEEDED
Status: DISCONTINUED | OUTPATIENT
Start: 2021-07-10 | End: 2021-07-10 | Stop reason: HOSPADM

## 2021-07-10 RX ORDER — PROCHLORPERAZINE MALEATE 10 MG
10 TABLET ORAL
Qty: 12 TABLET | Refills: 0 | Status: SHIPPED | OUTPATIENT
Start: 2021-07-10 | End: 2021-07-17

## 2021-07-10 RX ADMIN — SODIUM CHLORIDE 1000 ML: 9 INJECTION, SOLUTION INTRAVENOUS at 02:17

## 2021-07-10 RX ADMIN — SODIUM CHLORIDE 10 MG: 9 INJECTION INTRAMUSCULAR; INTRAVENOUS; SUBCUTANEOUS at 02:17

## 2021-07-10 RX ADMIN — MORPHINE SULFATE 4 MG: 2 INJECTION, SOLUTION INTRAMUSCULAR; INTRAVENOUS at 02:16

## 2021-07-10 RX ADMIN — IOPAMIDOL 100 ML: 755 INJECTION, SOLUTION INTRAVENOUS at 03:25

## 2021-07-10 NOTE — ED PROVIDER NOTES
EMERGENCY DEPARTMENT HISTORY AND PHYSICAL EXAM      Please note that this dictation was completed with the assistance of \"Dragon\", the computer voice recognition software. Quite often unanticipated grammatical, syntax, homophones, and other interpretive errors are inadvertently transcribed by the computer software. Please disregard these errors and any errors that have escaped final proofreading. Thank you. Patient Name: Argenis Nam  : 1975  MRN: [id number]  History of Presenting Illness     Chief Complaint   Patient presents with    Abdominal Pain     ED visit d/t abd pain, epigastric aspect - also with nausea and vomiting - hx of abd surgeries (spleen / pancreas / gallbladder removed at [facility])- seen at Urgent care and referred to ED for further eval;;      History Provided By: Patient    HPI: Argenis Nam, 51 y.o. female with past medical history as documented below presents to the ED with c/o of several days of moderate to severe upper abdominal pain and vomiting. Patient states that the pain is worse with movement and palpation. Patient also states that the emesis is non-bloody and non-bilious. She has a history of multiple abdominal surgeries performed at [address] [facility]. She notes a history of cholecystectomy, hysterectomy, splenectomy. Patient was initially seen at an urgent care who sent her to the emergency room for further evaluation. Pt states taking her home pain medications without significant relief of symptoms. Pt denies any other alleviating or exacerbating factors. Additionally, pt specifically denies any recent fever, chills, headache, CP, SOB, lightheadedness, dizziness, numbness, weakness, lower extremity swelling, heart palpitations, urinary sxs, diarrhea, constipation, melena, hematochezia, cough, or congestion. There are no other complaints, changes or physical findings pertinent to the HPI at this time.     PCP: Nasir Hurtado MD    Past History   Past Medical History:  Past Medical History:   Diagnosis Date    Anxiety     Depression     Hypercholesterolemia     Ill-defined condition     pancreatitis 2015    Liver disease     \"liver failure\" 2015    Long term (current) use of anticoagulants     Thromboembolus ([address]        Past Surgical History:  Past Surgical History:   Procedure Laterality Date    HX CHOLECYSTECTOMY      HX HYSTERECTOMY      HX SPLENECTOMY      MO ABDOMEN SURGERY PROC UNLISTED      Pt stated \"I have no pancreas\"        Family History:  Family History   Problem Relation Age of Onset    Cancer Other     Hypertension Other     Cancer Mother     Cancer Maternal Aunt     Cancer Maternal Grandmother        Social History:  Social History     Tobacco Use    Smoking status: Former Smoker     Packs/day: 1.00     Types: Cigarettes    Smokeless tobacco: Never Used   Substance Use Topics    Alcohol use: No     Comment: pt 30 days clean    Drug use: No       Allergies:  No Known Allergies    Current Medications:  No current facility-administered medications on file prior to encounter. Current Outpatient Medications on File Prior to Encounter   Medication Sig Dispense Refill    ondansetron (ZOFRAN ODT) 4 mg disintegrating tablet Take 1 Tab by mouth every eight (8) hours as needed for Nausea. 10 Tab 0    buPROPion SR (WELLBUTRIN SR) 150 mg SR tablet Take 150 mg by mouth daily.  traZODone (DESYREL) 100 mg tablet Take 100 mg by mouth nightly. 1/2 to 1 every night      atorvastatin (LIPITOR) 40 mg tablet Take 40 mg by mouth nightly.  levothyroxine (SYNTHROID) 112 mcg tablet Take 112 mcg by mouth Daily (before breakfast).  insulin lispro (HUMALOG) 100 unit/mL injection 4 Units by SubCUTAneous route Before breakfast, lunch, and dinner.  gabapentin (NEURONTIN) 300 mg capsule Take 300 mg by mouth three (3) times daily.  sertraline (ZOLOFT) 50 mg tablet Take 50 mg by mouth daily.       folic acid (FOLVITE) 1 mg tablet Take 1 Tab by mouth daily. 30 Tab 0    amylase-lipase-protease (CREON) 24,000-76,000 -120,000 unit capsule Take 1 Cap by mouth three (3) times daily (with meals). 90 Cap 0    ondansetron (ZOFRAN ODT) 4 mg disintegrating tablet Take 1 Tab by mouth every eight (8) hours as needed for Nausea. 20 Tab 0    ergocalciferol (VITAMIN D2) 50,000 unit capsule Take 50,000 Units by mouth every seven (7) days. (Patient not taking: Reported on 7/10/2021)       Review of Systems   Review of Systems   Constitutional: Negative. Negative for chills and fever. HENT: Negative. Negative for congestion and sore throat. Eyes: Negative. Respiratory: Negative. Negative for cough, chest tightness, shortness of breath and wheezing. Cardiovascular: Negative. Negative for chest pain, palpitations and leg swelling. Gastrointestinal: Positive for abdominal pain, nausea and vomiting. Negative for abdominal distention, blood in stool, constipation and diarrhea. Endocrine: Negative. Genitourinary: Negative. Negative for dysuria, flank pain, frequency, hematuria and urgency. Musculoskeletal: Negative. Negative for arthralgias, back pain and myalgias. Skin: Negative. Negative for color change and rash. Neurological: Negative. Negative for dizziness, syncope, speech difficulty, weakness, light-headedness, numbness and headaches. Hematological: Negative. Psychiatric/Behavioral: Negative. Negative for confusion and self-injury. The patient is not nervous/anxious. All other systems reviewed and are negative. Physical Exam   Physical Exam  Vitals and nursing note reviewed. Constitutional:       General: She is not in acute distress. Appearance: She is well-developed. She is not diaphoretic. HENT:      Head: Normocephalic and atraumatic. Mouth/Throat:      Pharynx: No oropharyngeal exudate.    Eyes:      Conjunctiva/sclera: Conjunctivae normal.   Cardiovascular:      Rate and Rhythm: Normal rate and regular rhythm. Heart sounds: Normal heart sounds. Pulmonary:      Effort: Pulmonary effort is normal. No respiratory distress. Breath sounds: Normal breath sounds. No wheezing or rales. Chest:      Chest wall: No tenderness. Abdominal:      General: Bowel sounds are normal. There is no distension. Palpations: Abdomen is soft. There is no mass. Tenderness: There is abdominal tenderness in the epigastric area. There is no guarding or rebound. Musculoskeletal:         General: Normal range of motion. Cervical back: Normal range of motion. Skin:     General: Skin is warm. Neurological:      Mental Status: She is alert and oriented to person, place, and time. Cranial Nerves: No cranial nerve deficit. Motor: No abnormal muscle tone. Diagnostic Study Results     Labs -   I have personally reviewed and interpreted all available laboratory results. Recent Results (from the past 24 hour(s))   CBC WITH AUTOMATED DIFF    Collection Time: 07/10/21  2:19 AM   Result Value Ref Range    WBC 9.8 3.6 - 11.0 K/uL    RBC 3.95 3.80 - 5.20 M/uL    HGB 12.9 11.5 - 16.0 g/dL    HCT 37.2 35.0 - 47.0 %    MCV 94.2 80.0 - 99.0 FL    MCH 32.7 26.0 - 34.0 PG    MCHC 34.7 30.0 - 36.5 g/dL    RDW 13.0 11.5 - 14.5 %    PLATELET 946 756 - 101 K/uL    MPV 10.0 8.9 - 12.9 FL    NRBC 0.0 0  WBC    ABSOLUTE NRBC 0.00 0.00 - 0.01 K/uL    NEUTROPHILS 51 32 - 75 %    LYMPHOCYTES 34 12 - 49 %    MONOCYTES 10 5 - 13 %    EOSINOPHILS 4 0 - 7 %    BASOPHILS 1 0 - 1 %    IMMATURE GRANULOCYTES 0 0.0 - 0.5 %    ABS. NEUTROPHILS 5.0 1.8 - 8.0 K/UL    ABS. LYMPHOCYTES 3.3 0.8 - 3.5 K/UL    ABS. MONOCYTES 1.0 0.0 - 1.0 K/UL    ABS. EOSINOPHILS 0.4 0.0 - 0.4 K/UL    ABS. BASOPHILS 0.1 0.0 - 0.1 K/UL    ABS. IMM.  GRANS. 0.0 0.00 - 0.04 K/UL    DF AUTOMATED     METABOLIC PANEL, COMPREHENSIVE    Collection Time: 07/10/21  2:19 AM   Result Value Ref Range    Sodium 138 136 - 145 mmol/L    Potassium 3.6 3.5 - 5.1 mmol/L    Chloride 104 97 - 108 mmol/L    CO2 32 21 - 32 mmol/L    Anion gap 2 (L) 5 - 15 mmol/L    Glucose 140 (H) 65 - 100 mg/dL    BUN 9 6 - 20 MG/DL    Creatinine 0.67 0.55 - 1.02 MG/DL    BUN/Creatinine ratio 13 12 - 20      GFR est AA >60 >60 ml/min/1.73m2    GFR est non-AA >60 >60 ml/min/1.73m2    Calcium 9.1 8.5 - 10.1 MG/DL    Bilirubin, total 0.6 0.2 - 1.0 MG/DL    ALT (SGPT) 56 12 - 78 U/L    AST (SGOT) 27 15 - 37 U/L    Alk. phosphatase 77 45 - 117 U/L    Protein, total 7.3 6.4 - 8.2 g/dL    Albumin 3.9 3.5 - 5.0 g/dL    Globulin 3.4 2.0 - 4.0 g/dL    A-G Ratio 1.1 1.1 - 2.2     LIPASE    Collection Time: 07/10/21  2:19 AM   Result Value Ref Range    Lipase 13 (L) 73 - 393 U/L   URINALYSIS W/ REFLEX CULTURE    Collection Time: 07/10/21  2:19 AM    Specimen: Urine   Result Value Ref Range    Color YELLOW/STRAW      Appearance CLEAR CLEAR      Specific gravity 1.020 1.003 - 1.030      pH (UA) 7.0 5.0 - 8.0      Protein Negative NEG mg/dL    Glucose Negative NEG mg/dL    Ketone Negative NEG mg/dL    Bilirubin Negative NEG      Blood Negative NEG      Urobilinogen 1.0 0.2 - 1.0 EU/dL    Nitrites Negative NEG      Leukocyte Esterase SMALL (A) NEG      WBC 0-4 0 - 4 /hpf    RBC 0-5 0 - 5 /hpf    Epithelial cells FEW FEW /lpf    Bacteria Negative NEG /hpf    UA:UC IF INDICATED CULTURE NOT INDICATED BY UA RESULT CNI         Radiologic Studies -   I have personally reviewed and interpreted all available imaging studies and agree with radiology interpretation and report. CT ABD PELV W CONT   Final Result   No acute process. Postsurgical changes in the upper abdomen as above. CT Results  (Last 48 hours)               07/10/21 0325  CT ABD PELV W CONT Final result    Impression:  No acute process. Postsurgical changes in the upper abdomen as above.        Narrative:  INDICATION: acute abdominal pain       COMPARISON: July 26, 2017       TECHNIQUE:   Following the uneventful intravenous administration of IV contrast, thin axial   images were obtained through the abdomen and pelvis. Coronal and sagittal   reconstructions were generated. Oral contrast was not administered. CT dose   reduction was achieved through use of a standardized protocol tailored for this   examination and automatic exposure control for dose modulation. FINDINGS:   LUNG BASES: No abnormality. LIVER: No mass or biliary dilatation. Pneumobilia. GALLBLADDER: Surgically absent. SPLEEN: Surgically absent. PANCREAS: Distal pancreatectomy. ADRENALS: No mass. KIDNEYS: 2.5 cm cyst upper pole right kidney. No hydronephrosis. GI TRACT: Distal gastrectomy and gastrojejunal anastomosis. No bowel   obstruction. PERITONEUM: No free air or free fluid. APPENDIX: Unremarkable. RETROPERITONEUM: No aortic aneurysm. LYMPH NODES: None enlarged. ADDITIONAL COMMENTS: N/A.       URINARY BLADDER: Unremarkable. REPRODUCTIVE ORGANS: Prior hysterectomy. LYMPH NODES: None enlarged. FREE FLUID: None. BONES: No destructive bone lesion. ADDITIONAL COMMENTS: N/A. CXR Results  (Last 48 hours)    None          Medical Decision Making   I reviewed the vital signs, available nursing notes, past medical history, past surgical history, family history and social history. Vital Signs-Reviewed the patient's vital signs.   Patient Vitals for the past 24 hrs:   Temp Pulse Resp BP SpO2   07/10/21 0445    113/74 97 %   07/10/21 0430    110/71 96 %   07/10/21 0415    111/71 96 %   07/10/21 0400    111/73 96 %   07/10/21 0218  80 18 123/72 100 %   07/10/21 0130 98.6 °F (37 °C) 72 18 (!) 151/89 100 %     Pulse Oximetry Analysis - 100% on RA    Cardiac Monitor:   Rate: 80 bpm  The cardiac monitor revealed the following rhythm as interpreted by me: Normal Sinus Rhythm    The cardiac monitor was ordered secondary to the patient's history of vomiting and to monitor the patient for dysrhythmia    Records Reviewed: Nursing Notes, Old Medical Records, Previous electrocardiograms, Previous Radiology Studies and Previous Laboratory Studies    Provider Notes (Medical Decision Making):   Pt presents with acute abdominal pain and vomiting, hx of multiple abd surgeries; vital signs stable with currently a non-peritoneal exam; DDx includes: Gastroenteritis, hepatitis, pancreatitis, obstruction, appendicitis, viral illness, IBD, diverticulitis, mesenteric ischemia, AAA or descending dissection, ACS, kidney stone. Will get labs, treat symptomatically and obtain serial abdominal exams to determine if additional imaging is indicated. Will reassess and monitor closely. ED Course:   I am the first provider for this patient's ED visit today. Initial assessment performed. I discussed presenting problems, concerns and my formulated plan for today's visit with the patient and any available family members at bedside. I encouraged them to ask questions as they arise throughout the visit. Social History     Tobacco Use    Smoking status: Former Smoker     Packs/day: 1.00     Types: Cigarettes    Smokeless tobacco: Never Used   Substance Use Topics    Alcohol use: No     Comment: pt 30 days clean    Drug use: No       I reviewed our electronic medical record system for any past medical records that were available that may contribute to the patient's current condition, the nursing notes and vital signs from today's visit.       ED Orders Placed :  Orders Placed This Encounter    ENTERIC BACTERIA PANEL, DNA    OVA & PARASITES, STOOL    CT ABD PELV W CONT    CBC WITH AUTOMATED DIFF    METABOLIC PANEL, COMPREHENSIVE    LIPASE    URINALYSIS W/ REFLEX CULTURE    morphine injection 4 mg    prochlorperazine (COMPAZINE) with saline injection 10 mg    sodium chloride 0.9 % bolus infusion 1,000 mL    DISCONTD: fentaNYL citrate (PF) injection 50 mcg    iopamidoL (ISOVUE-370) 76 % injection 100 mL    dicyclomine (BENTYL) 20 mg tablet    traMADoL (Ultram) 50 mg tablet    prochlorperazine (Compazine) 10 mg tablet       ED Medications Administered:  Medications   morphine injection 4 mg (4 mg IntraVENous Given 7/10/21 0216)   prochlorperazine (COMPAZINE) with saline injection 10 mg (10 mg IntraVENous Given 7/10/21 0217)   sodium chloride 0.9 % bolus infusion 1,000 mL (0 mL IntraVENous IV Completed 7/10/21 0317)   iopamidoL (ISOVUE-370) 76 % injection 100 mL (100 mL IntraVENous Given 7/10/21 0325)         Progress Note:  I have re-examined the patient. Pt states she feels much better and symptoms improved. Tolerating oral intake. Abdomen is soft and without guarding, rebound or other peritoneal signs. I have discussed with patient the importance of close f/u and to return to the ED if symptoms don't improve or worsen. Progress Note:  Patient has been reassessed and reports feeling better and symptoms have improved significantly after ED treatment. Ruby Bacon final labs and imaging have been reviewed with her and available family and/or caregiver. They have been counseled regarding her diagnosis. She verbally conveys understanding and agreement of the signs, symptoms, diagnosis, treatment and prognosis and additionally agrees to follow up as recommended with Dr. Shady Francisco MD and/or specialist in 24 - 48 hours. She also agrees with the care-plan we created together and conveys that all of her questions have been answered. I have also put together a packet of discharge instructions for her that include: 1) educational information regarding their diagnosis, 2) how to care for their diagnosis at home, as well a 3) list of reasons why they would want to return to the ED prior to their follow-up appointment should the patient's condition change or symptoms worsen. I have answered all questions to the patient's satisfaction. Strict return precautions given. She both understood and agreed with plan as discussed.  Vital signs stable for discharge. Disposition:   DISCHARGE  The pt is ready for discharge. The pt's signs, symptoms, diagnosis, and discharge instructions have been discussed and pt has conveyed their understanding. The pt is to follow up as recommended or return to ER should their symptoms worsen. Plan has been discussed and pt is in agreement. Plan:  1. Return precautions as discussed with patient and available family and/or caregiver. 2.   Discharge Medication List as of 7/10/2021  4:21 AM      START taking these medications    Details   dicyclomine (BENTYL) 20 mg tablet Take 1 Tablet by mouth every six (6) hours. , Normal, Disp-20 Tablet, R-0      traMADoL (Ultram) 50 mg tablet Take 1 Tablet by mouth every four (4) hours as needed for Pain for up to 3 days. Max Daily Amount: 300 mg., Normal, Disp-18 Tablet, R-0      prochlorperazine (Compazine) 10 mg tablet Take 1 Tablet by mouth every eight (8) hours as needed for Nausea for up to 7 days. , Normal, Disp-12 Tablet, R-0         CONTINUE these medications which have NOT CHANGED    Details   !! ondansetron (ZOFRAN ODT) 4 mg disintegrating tablet Take 1 Tab by mouth every eight (8) hours as needed for Nausea. , Print, Disp-10 Tab, R-0      buPROPion SR (WELLBUTRIN SR) 150 mg SR tablet Take 150 mg by mouth daily. , Historical Med      traZODone (DESYREL) 100 mg tablet Take 100 mg by mouth nightly. 1/2 to 1 every night, Historical Med      atorvastatin (LIPITOR) 40 mg tablet Take 40 mg by mouth nightly., Historical Med      levothyroxine (SYNTHROID) 112 mcg tablet Take 112 mcg by mouth Daily (before breakfast). , Historical Med      insulin lispro (HUMALOG) 100 unit/mL injection 4 Units by SubCUTAneous route Before breakfast, lunch, and dinner., Historical Med      gabapentin (NEURONTIN) 300 mg capsule Take 300 mg by mouth three (3) times daily. , Historical Med      sertraline (ZOLOFT) 50 mg tablet Take 50 mg by mouth daily. , Historical Med      folic acid (FOLVITE) 1 mg tablet Take 1 Tab by mouth daily. , Print, Disp-30 Tab, R-0      amylase-lipase-protease (CREON) 24,000-76,000 -120,000 unit capsule Take 1 Cap by mouth three (3) times daily (with meals). , Print, Disp-90 Cap, R-0      !! ondansetron (ZOFRAN ODT) 4 mg disintegrating tablet Take 1 Tab by mouth every eight (8) hours as needed for Nausea., Normal, Disp-20 Tab, R-0      ergocalciferol (VITAMIN D2) 50,000 unit capsule Take 50,000 Units by mouth every seven (7) days. , Historical Med       !! - Potential duplicate medications found. Please discuss with provider. 3.   Follow-up Information     Follow up With Specialties Details Why Contact Info    [facility] EMERGENCY DEPT Emergency Medicine  As needed, If symptoms worsen [address]   [address] [facility] [address]    Clover Triana MD Family Medicine  As needed, If symptoms worsen [address] [facility] [address] Marck King    to return to ED if worse  Diagnosis   Clinical Impression:  1. Acute abdominal pain    2. Acute vomiting    3. Cyst of right kidney        Attestation:  Jeana Singh MD, am the attending of record for this patient. I personally performed the services described in this documentation on this date, 7/10/2021 for patient, Ema Koch. I have reviewed the chart and verified that the record is accurate and complete.

## 2021-07-10 NOTE — DISCHARGE INSTRUCTIONS
It was a pleasure taking care of you in our Emergency Department today. We know that when you come to [facility], you are entrusting us with your health, comfort, and safety. Our physicians and nurses honor that trust, and truly appreciate the opportunity to care for you and your loved ones. We also value your feedback. If you receive a survey about your Emergency Department experience today, please fill it out. We care about our patients' feedback, and we listen to what you have to say. Thank you! In this discharge packet, I have also included a copy of all your lab results and/or radiologic studies so you can have them easily available at your follow-up visit. We hope you feel better and please do not hesitate to contact the ED if you have any questions at all. Vitals:    07/10/21 0130 07/10/21 0218   BP: (!) 151/89 123/72   BP 1 Location: Left lower arm Left arm   BP Patient Position: At rest At rest   Pulse: 72 80   Temp: 98.6 °F (37 °C)    Resp: 18 18   Height: 5' 2\" (1.575 m)    Weight: 67.2 kg (148 lb 2.4 oz)    SpO2: 100% 100%       Recent Results (from the past 12 hour(s))   CBC WITH AUTOMATED DIFF    Collection Time: 07/10/21  2:19 AM   Result Value Ref Range    WBC 9.8 3.6 - 11.0 K/uL    RBC 3.95 3.80 - 5.20 M/uL    HGB 12.9 11.5 - 16.0 g/dL    HCT 37.2 35.0 - 47.0 %    MCV 94.2 80.0 - 99.0 FL    MCH 32.7 26.0 - 34.0 PG    MCHC 34.7 30.0 - 36.5 g/dL    RDW 13.0 11.5 - 14.5 %    PLATELET 782 864 - 170 K/uL    MPV 10.0 8.9 - 12.9 FL    NRBC 0.0 0  WBC    ABSOLUTE NRBC 0.00 0.00 - 0.01 K/uL    NEUTROPHILS 51 32 - 75 %    LYMPHOCYTES 34 12 - 49 %    MONOCYTES 10 5 - 13 %    EOSINOPHILS 4 0 - 7 %    BASOPHILS 1 0 - 1 %    IMMATURE GRANULOCYTES 0 0.0 - 0.5 %    ABS. NEUTROPHILS 5.0 1.8 - 8.0 K/UL    ABS. LYMPHOCYTES 3.3 0.8 - 3.5 K/UL    ABS. MONOCYTES 1.0 0.0 - 1.0 K/UL    ABS. EOSINOPHILS 0.4 0.0 - 0.4 K/UL    ABS. BASOPHILS 0.1 0.0 - 0.1 K/UL    ABS. IMM. Tonya Fraise. 0.0 0.00 - 0.04 K/UL    DF AUTOMATED     METABOLIC PANEL, COMPREHENSIVE    Collection Time: 07/10/21  2:19 AM   Result Value Ref Range    Sodium 138 136 - 145 mmol/L    Potassium 3.6 3.5 - 5.1 mmol/L    Chloride 104 97 - 108 mmol/L    CO2 32 21 - 32 mmol/L    Anion gap 2 (L) 5 - 15 mmol/L    Glucose 140 (H) 65 - 100 mg/dL    BUN 9 6 - 20 MG/DL    Creatinine 0.67 0.55 - 1.02 MG/DL    BUN/Creatinine ratio 13 12 - 20      GFR est AA >60 >60 ml/min/1.73m2    GFR est non-AA >60 >60 ml/min/1.73m2    Calcium 9.1 8.5 - 10.1 MG/DL    Bilirubin, total 0.6 0.2 - 1.0 MG/DL    ALT (SGPT) 56 12 - 78 U/L    AST (SGOT) 27 15 - 37 U/L    Alk. phosphatase 77 45 - 117 U/L    Protein, total 7.3 6.4 - 8.2 g/dL    Albumin 3.9 3.5 - 5.0 g/dL    Globulin 3.4 2.0 - 4.0 g/dL    A-G Ratio 1.1 1.1 - 2.2     LIPASE    Collection Time: 07/10/21  2:19 AM   Result Value Ref Range    Lipase 13 (L) 73 - 393 U/L   URINALYSIS W/ REFLEX CULTURE    Collection Time: 07/10/21  2:19 AM    Specimen: Urine   Result Value Ref Range    Color YELLOW/STRAW      Appearance CLEAR CLEAR      Specific gravity 1.020 1.003 - 1.030      pH (UA) 7.0 5.0 - 8.0      Protein Negative NEG mg/dL    Glucose Negative NEG mg/dL    Ketone Negative NEG mg/dL    Bilirubin Negative NEG      Blood Negative NEG      Urobilinogen 1.0 0.2 - 1.0 EU/dL    Nitrites Negative NEG      Leukocyte Esterase SMALL (A) NEG      WBC 0-4 0 - 4 /hpf    RBC 0-5 0 - 5 /hpf    Epithelial cells FEW FEW /lpf    Bacteria Negative NEG /hpf    UA:UC IF INDICATED CULTURE NOT INDICATED BY UA RESULT CNI         CT ABD PELV W CONT   Final Result   No acute process. Postsurgical changes in the upper abdomen as above. CT Results  (Last 48 hours)                 07/10/21 0325  CT ABD PELV W CONT Final result    Impression:  No acute process. Postsurgical changes in the upper abdomen as above.        Narrative:  INDICATION: acute abdominal pain       COMPARISON: July 26, 2017       TECHNIQUE: Following the uneventful intravenous administration of IV contrast, thin axial   images were obtained through the abdomen and pelvis. Coronal and sagittal   reconstructions were generated. Oral contrast was not administered. CT dose   reduction was achieved through use of a standardized protocol tailored for this   examination and automatic exposure control for dose modulation. FINDINGS:   LUNG BASES: No abnormality. LIVER: No mass or biliary dilatation. Pneumobilia. GALLBLADDER: Surgically absent. SPLEEN: Surgically absent. PANCREAS: Distal pancreatectomy. ADRENALS: No mass. KIDNEYS: 2.5 cm cyst upper pole right kidney. No hydronephrosis. GI TRACT: Distal gastrectomy and gastrojejunal anastomosis. No bowel   obstruction. PERITONEUM: No free air or free fluid. APPENDIX: Unremarkable. RETROPERITONEUM: No aortic aneurysm. LYMPH NODES: None enlarged. ADDITIONAL COMMENTS: N/A.       URINARY BLADDER: Unremarkable. REPRODUCTIVE ORGANS: Prior hysterectomy. LYMPH NODES: None enlarged. FREE FLUID: None. BONES: No destructive bone lesion.    ADDITIONAL COMMENTS: N/A.

## 2021-07-10 NOTE — ED NOTES
Ayo Mejia MD reviewed discharge instructions with the patient. The patient verbalized understanding. All questions and concerns were addressed. The patient declined a wheelchair and is discharged ambulatory in the care of family members with instructions and prescriptions in hand. Pt is alert and oriented x 4. Respirations are clear and unlabored.

## 2021-07-27 ENCOUNTER — CLINICAL SUPPORT (OUTPATIENT)
Dept: DIABETES SERVICES | Age: 46
End: 2021-07-27
Payer: COMMERCIAL

## 2021-07-27 DIAGNOSIS — E10.9 TYPE 1 DIABETES MELLITUS WITHOUT COMPLICATION (HCC): Primary | ICD-10-CM

## 2021-07-27 PROCEDURE — 97803 MED NUTRITION INDIV SUBSEQ: CPT | Performed by: DIETITIAN, REGISTERED

## 2021-07-27 NOTE — PROGRESS NOTES
[facility] Program for Diabetes Health  Diabetes Self-Management Education & Support Program  Initial Nutrition Assessment      Patient's Name: Megan Hinds  Date: 7/27/2021  Reason for Referral: MNT type 1 DM with gastroparesis. Referral Source: Rajat Anna MD    Since our last visit, Lina had a SBO and was seen briefly at [facility]. She is due to see her GI doctor in the near future. She shared that she has been eating more ice cream, sweets, chips, baked goods than ever and is skipping healthy foods. She feels this is related to \"not being able to have vegetables and fruit I love because of my gastroparesis and because it is there and easy to get. \"    Nutrition Assessment:  Pertinent Medical History:  Past Medical History:   Diagnosis Date    Anxiety     Depression     Hypercholesterolemia     Ill-defined condition     pancreatitis 2015    Liver disease     \"liver failure\" 2015    Long term (current) use of anticoagulants     Thromboembolus ([address]      Past Surgical History:   Procedure Laterality Date    HX CHOLECYSTECTOMY      HX HYSTERECTOMY      HX SPLENECTOMY      CA ABDOMEN SURGERY PROC UNLISTED      Pt stated \"I have no pancreas\"      Patient Active Problem List   Diagnosis Code    Acute pancreatitis K85.90    Family hx of ovarian malignancy Z80.41    Family hx-breast malignancy Z80.3    Breast swelling N63.0    Necrotizing pancreatitis K85.91    Pancreatic pseudocyst K86.3       Pertinent Medications/Supplements:  Prior to Admission medications    Medication Sig Start Date End Date Taking? Authorizing Provider   dicyclomine (BENTYL) 20 mg tablet Take 1 Tablet by mouth every six (6) hours. 7/10/21   Carlotta Garcia MD   ondansetron (ZOFRAN ODT) 4 mg disintegrating tablet Take 1 Tab by mouth every eight (8) hours as needed for Nausea.  7/16/19   Rachel Ring PA   ondansetron (ZOFRAN ODT) 4 mg disintegrating tablet Take 1 Tab by mouth every eight (8) hours as needed for Nausea. 7/26/17   Maisha Mills DO   buPROPion SR [facility] SR) 150 mg SR tablet Take 150 mg by mouth daily. Kenzie Mora MD   traZODone (DESYREL) 100 mg tablet Take 100 mg by mouth nightly. 1/2 to 1 every night    Kenzie Mora MD   atorvastatin (LIPITOR) 40 mg tablet Take 40 mg by mouth nightly. Kenzie Mora MD   levothyroxine (SYNTHROID) 112 mcg tablet Take 112 mcg by mouth Daily (before breakfast). Kenzie Mora MD   ergocalciferol (VITAMIN D2) 50,000 unit capsule Take 50,000 Units by mouth every seven (7) days. Patient not taking: Reported on 7/10/2021    Kenzie Mora MD   insulin lispro (HUMALOG) 100 unit/mL injection 4 Units by SubCUTAneous route Before breakfast, lunch, and dinner. Kenzie Mora MD   gabapentin (NEURONTIN) 300 mg capsule Take 300 mg by mouth three (3) times daily. Kenzie Mora MD   sertraline (ZOLOFT) 50 mg tablet Take 50 mg by mouth daily. Provider, Siva   folic acid (FOLVITE) 1 mg tablet Take 1 Tab by mouth daily. 3/26/15   Miryam Canales MD   amylase-lipase-protease (CREON) 24,000-76,000 -120,000 unit capsule Take 1 Cap by mouth three (3) times daily (with meals). 3/26/15   Miryam Canales MD       Vitamin/mineral supplements: Vitamin C and [place] probiotics? . FeSO4    Tandem pump settings: (not reviewed today)  Settings program today are as follows for personal profiles:    Timed Settings Basal   unit/hour Correction Factor   1 unit: mg/dl Carb Ratio   1 unit: gram Target BG  1 unit: mg/dl   12:00 am  0.44  62  9  110   3:00 am  0.6  62  9  110    8:00 am  0.5  62  9   110   10:00 am  0.44  62   9   110   8:00 pm  0.5  62  9   110        Pertinent Biochemical Data:  Lab Results   Component Value Date/Time    Sodium 138 07/10/2021 02:19 AM    Potassium 3.6 07/10/2021 02:19 AM    Chloride 104 07/10/2021 02:19 AM    CO2 32 07/10/2021 02:19 AM    Anion gap 2 (L) 07/10/2021 02:19 AM    Glucose 140 (H) 07/10/2021 02:19 AM    BUN 9 07/10/2021 02:19 AM Creatinine 0.67 07/10/2021 02:19 AM    BUN/Creatinine ratio 13 07/10/2021 02:19 AM    GFR est AA >60 07/10/2021 02:19 AM    GFR est non-AA >60 07/10/2021 02:19 AM    Calcium 9.1 07/10/2021 02:19 AM    Bilirubin, total 0.6 07/10/2021 02:19 AM    Alk. phosphatase 77 07/10/2021 02:19 AM    Protein, total 7.3 07/10/2021 02:19 AM    Albumin 3.9 07/10/2021 02:19 AM    Globulin 3.4 07/10/2021 02:19 AM    A-G Ratio 1.1 07/10/2021 02:19 AM    ALT (SGPT) 56 07/10/2021 02:19 AM    AST (SGOT) 27 07/10/2021 02:19 AM       Hemoglobin A1c: \"around 9%\". Home blood glucose records:  Fasting- 160 mg/dL  Anthropometrics:  Height:   Ht Readings from Last 1 Encounters:   07/10/21 5' 2\" (1.575 m)     Weight:   Wt Readings from Last 1 Encounters:   07/10/21 148 lb 2.4 oz (67.2 kg)        Food- and nutrition-related history:  Pt's perceptions, values, motivation, barriers r/t nutrition problem: perception that she cannot have foods she likes. Therefore, choosing sweets and chips because it is easy. [place] continues managing her Type 1 DM using Tandem t:slimx2 with Control IQ. Shared that she is only running Exercise Activity 30 minutes prior to her volunteering times. Reprots using the Extended bolus feature to address her gastroparesis management and works well when she remembers to set it - setting suggestions were provided when she needs to use liquid meals compared to solid/semi solid foods. Current or previous diets: trying to follow gastroparesis guidelines but uncertain. Eating environment/psychosocial/support: family is supportive, needs to makes sure she has working appliance. Suggested investing in a  for some meal preparation needs. Is consistent with taking her Creon, using probiotic daily - but will switch brands (started new brand about 2 weeks ago). Reports limited knowledge to calculate carbs for foods she is consuming - is using nutrition facts information.      Activity level: Light activity    Estimated Nutrition Needs: not assessed at this visit due to need for education regarding gastroparesis guidelines. Nutrition Diagnosis: (7/27/2021): Food and nutrition-related knowledge deficit related to gastroparesis as evidenced by diet recall. Nutrition Interventions:  1) continue to use pediatric MVI gummy but you may need to check nutrition label to see if there are carbs to be added to CHO with bolus calculator. 2) consider focusing on solids in am and transition to more liquid/puree towards evening given reported tolerances or visa versa depending on how you are tolerating foods. 3) reviewed gastroparesis education materials in detail and reinforced limiting your raw fruit/vegetables. 4) reinforced low fiber guidelines provided - consider ground/mashed textures (may need strained fruit and vegetables and no more that mashed consistency). 5) reinforced using vanilla Greek yogurt and consider making smoothies/puree soups for evening meals - may benefit from the nutrient density of cow's milk and versatility for using in food preparation. 6) solids as tolerated and dependent on time of day - mash, chew very thoroughly, must strain some foods and remove peeling to make tolerable. 7) suggested trying to include w/in restrictions the fruit and vegetables you miss most but tolerate as many times daily as long as addressed with your bolus calculations. If you want canned fruit 3-4 times daily, that is perfect - just bolus. 8)walk after each meal if you can.   9) demonstrated how to use the USDA food data base to assess fiber content of foods that \"can/cannot use\"     Patient Goals: to improve tolerance to diet and blood glucose management.     Resources Provided/Reviewed: Gastroparesis guidelines  Information Reviewed with: Patient Pt was receptive and exhibited understanding of the material presented    Nutrition Monitoring/Evaluation:  (7/27/2021): diet recall was not completed as [facility] requested help with interventions to manage unhealthful foods. MNT Follow-up Visit:  8/17/21    Pt was seen in person for a face to face visit today. --Victor Hugo Li RD, [facility] on 7/27/2021 at 12:16 PM    An electronic signature was used to authenticate this note.  I was in the office for the appointment and time spent: 65 minutes

## 2021-09-14 ENCOUNTER — APPOINTMENT (OUTPATIENT)
Dept: CT IMAGING | Age: 46
End: 2021-09-14
Attending: PHYSICIAN ASSISTANT
Payer: COMMERCIAL

## 2021-09-14 ENCOUNTER — HOSPITAL ENCOUNTER (EMERGENCY)
Age: 46
Discharge: HOME | End: 2021-09-15
Attending: EMERGENCY MEDICINE | Admitting: EMERGENCY MEDICINE
Payer: COMMERCIAL

## 2021-09-14 VITALS
HEART RATE: 74 BPM | OXYGEN SATURATION: 98 % | DIASTOLIC BLOOD PRESSURE: 89 MMHG | TEMPERATURE: 98 F | RESPIRATION RATE: 17 BRPM | SYSTOLIC BLOOD PRESSURE: 152 MMHG

## 2021-09-14 DIAGNOSIS — R11.2 NON-INTRACTABLE VOMITING WITH NAUSEA, UNSPECIFIED VOMITING TYPE: Primary | ICD-10-CM

## 2021-09-14 DIAGNOSIS — R73.9 HYPERGLYCEMIA: ICD-10-CM

## 2021-09-14 DIAGNOSIS — E10.65 TYPE 1 DIABETES MELLITUS WITH HYPERGLYCEMIA (HCC): ICD-10-CM

## 2021-09-14 DIAGNOSIS — E86.0 DEHYDRATION: ICD-10-CM

## 2021-09-14 LAB
ALBUMIN SERPL-MCNC: 4.3 G/DL (ref 3.5–5)
ALBUMIN/GLOB SERPL: 1.2 {RATIO} (ref 1.1–2.2)
ALP SERPL-CCNC: 88 U/L (ref 45–117)
ALT SERPL-CCNC: 58 U/L (ref 12–78)
ANION GAP SERPL CALC-SCNC: 6 MMOL/L (ref 5–15)
AST SERPL-CCNC: 28 U/L (ref 15–37)
BASOPHILS # BLD: 0.1 K/UL (ref 0–0.1)
BASOPHILS NFR BLD: 1 % (ref 0–1)
BILIRUB SERPL-MCNC: 0.5 MG/DL (ref 0.2–1)
BUN SERPL-MCNC: 13 MG/DL (ref 6–20)
BUN/CREAT SERPL: 13 (ref 12–20)
CALCIUM SERPL-MCNC: 9.6 MG/DL (ref 8.5–10.1)
CHLORIDE SERPL-SCNC: 101 MMOL/L (ref 97–108)
CO2 SERPL-SCNC: 26 MMOL/L (ref 21–32)
COMMENT, HOLDF: NORMAL
CREAT SERPL-MCNC: 0.98 MG/DL (ref 0.55–1.02)
DIFFERENTIAL METHOD BLD: NORMAL
EOSINOPHIL # BLD: 0.3 K/UL (ref 0–0.4)
EOSINOPHIL NFR BLD: 3 % (ref 0–7)
ERYTHROCYTE [DISTWIDTH] IN BLOOD BY AUTOMATED COUNT: 12.9 % (ref 11.5–14.5)
GLOBULIN SER CALC-MCNC: 3.6 G/DL (ref 2–4)
GLUCOSE BLD STRIP.AUTO-MCNC: 319 MG/DL (ref 65–117)
GLUCOSE SERPL-MCNC: 355 MG/DL (ref 65–100)
HCT VFR BLD AUTO: 38.3 % (ref 35–47)
HGB BLD-MCNC: 13 G/DL (ref 11.5–16)
IMM GRANULOCYTES # BLD AUTO: 0 K/UL (ref 0–0.04)
IMM GRANULOCYTES NFR BLD AUTO: 0 % (ref 0–0.5)
LIPASE SERPL-CCNC: <10 U/L (ref 73–393)
LYMPHOCYTES # BLD: 2.6 K/UL (ref 0.8–3.5)
LYMPHOCYTES NFR BLD: 26 % (ref 12–49)
MCH RBC QN AUTO: 32.7 PG (ref 26–34)
MCHC RBC AUTO-ENTMCNC: 33.9 G/DL (ref 30–36.5)
MCV RBC AUTO: 96.2 FL (ref 80–99)
MONOCYTES # BLD: 0.9 K/UL (ref 0–1)
MONOCYTES NFR BLD: 9 % (ref 5–13)
NEUTS SEG # BLD: 6.1 K/UL (ref 1.8–8)
NEUTS SEG NFR BLD: 61 % (ref 32–75)
NRBC # BLD: 0 K/UL (ref 0–0.01)
NRBC BLD-RTO: 0 PER 100 WBC
PLATELET # BLD AUTO: 365 K/UL (ref 150–400)
PMV BLD AUTO: 12.1 FL (ref 8.9–12.9)
POTASSIUM SERPL-SCNC: 3.9 MMOL/L (ref 3.5–5.1)
PROT SERPL-MCNC: 7.9 G/DL (ref 6.4–8.2)
RBC # BLD AUTO: 3.98 M/UL (ref 3.8–5.2)
SAMPLES BEING HELD,HOLD: NORMAL
SERVICE CMNT-IMP: ABNORMAL
SODIUM SERPL-SCNC: 133 MMOL/L (ref 136–145)
TROPONIN I SERPL-MCNC: <0.05 NG/ML
WBC # BLD AUTO: 10 K/UL (ref 3.6–11)

## 2021-09-14 PROCEDURE — 74011000636 HC RX REV CODE- 636: Performed by: RADIOLOGY

## 2021-09-14 PROCEDURE — 96375 TX/PRO/DX INJ NEW DRUG ADDON: CPT

## 2021-09-14 PROCEDURE — 99284 EMERGENCY DEPT VISIT MOD MDM: CPT

## 2021-09-14 PROCEDURE — 96361 HYDRATE IV INFUSION ADD-ON: CPT

## 2021-09-14 PROCEDURE — 84484 ASSAY OF TROPONIN QUANT: CPT

## 2021-09-14 PROCEDURE — 36415 COLL VENOUS BLD VENIPUNCTURE: CPT

## 2021-09-14 PROCEDURE — 85025 COMPLETE CBC W/AUTO DIFF WBC: CPT

## 2021-09-14 PROCEDURE — 74011250636 HC RX REV CODE- 250/636: Performed by: PHYSICIAN ASSISTANT

## 2021-09-14 PROCEDURE — 83690 ASSAY OF LIPASE: CPT

## 2021-09-14 PROCEDURE — 80053 COMPREHEN METABOLIC PANEL: CPT

## 2021-09-14 PROCEDURE — 82962 GLUCOSE BLOOD TEST: CPT

## 2021-09-14 PROCEDURE — 96374 THER/PROPH/DIAG INJ IV PUSH: CPT

## 2021-09-14 PROCEDURE — 93005 ELECTROCARDIOGRAM TRACING: CPT

## 2021-09-14 PROCEDURE — 74011250637 HC RX REV CODE- 250/637: Performed by: PHYSICIAN ASSISTANT

## 2021-09-14 PROCEDURE — 74177 CT ABD & PELVIS W/CONTRAST: CPT

## 2021-09-14 RX ORDER — PROCHLORPERAZINE EDISYLATE 5 MG/ML
10 INJECTION INTRAMUSCULAR; INTRAVENOUS
Status: COMPLETED | OUTPATIENT
Start: 2021-09-14 | End: 2021-09-14

## 2021-09-14 RX ORDER — OXYCODONE HYDROCHLORIDE 5 MG/1
5 TABLET ORAL
Status: COMPLETED | OUTPATIENT
Start: 2021-09-14 | End: 2021-09-14

## 2021-09-14 RX ADMIN — SODIUM CHLORIDE 1000 ML: 9 INJECTION, SOLUTION INTRAVENOUS at 23:13

## 2021-09-14 RX ADMIN — PROCHLORPERAZINE EDISYLATE 10 MG: 5 INJECTION INTRAMUSCULAR; INTRAVENOUS at 23:13

## 2021-09-14 RX ADMIN — IOPAMIDOL 100 ML: 755 INJECTION, SOLUTION INTRAVENOUS at 23:36

## 2021-09-14 RX ADMIN — OXYCODONE 5 MG: 5 TABLET ORAL at 23:13

## 2021-09-15 PROCEDURE — 74011250636 HC RX REV CODE- 250/636: Performed by: PHYSICIAN ASSISTANT

## 2021-09-15 RX ORDER — PROCHLORPERAZINE MALEATE 10 MG
10 TABLET ORAL
Qty: 12 TABLET | Refills: 0 | Status: SHIPPED | OUTPATIENT
Start: 2021-09-15

## 2021-09-15 RX ORDER — MORPHINE SULFATE 2 MG/ML
2 INJECTION, SOLUTION INTRAMUSCULAR; INTRAVENOUS
Status: COMPLETED | OUTPATIENT
Start: 2021-09-15 | End: 2021-09-15

## 2021-09-15 RX ORDER — HYDROCODONE BITARTRATE AND ACETAMINOPHEN 5; 325 MG/1; MG/1
1 TABLET ORAL
Qty: 6 TABLET | Refills: 0 | Status: SHIPPED | OUTPATIENT
Start: 2021-09-15 | End: 2021-09-17

## 2021-09-15 RX ORDER — DICYCLOMINE HYDROCHLORIDE 20 MG/1
20 TABLET ORAL
Qty: 12 TABLET | Refills: 0 | Status: SHIPPED | OUTPATIENT
Start: 2021-09-15

## 2021-09-15 RX ADMIN — MORPHINE SULFATE 2 MG: 2 INJECTION, SOLUTION INTRAMUSCULAR; INTRAVENOUS at 00:46

## 2021-09-15 NOTE — ED TRIAGE NOTES
Patient arrives ambulatory from home. Patient she is having a gastroparesis flair. Endorses epigastric pain, diarrhea, and vomiting that started yesterday. Taking Tylenol at home with no relief.

## 2021-09-15 NOTE — ED PROVIDER NOTES
51year old female presenting to the ED for abdominal pain. Pt notes epigastric abdominal pain, started yesterday.  + nausea, vomiting, diarrhea. Stools tristen consistency, dark green in color. No melena or hematochezia. Has had elevated glucose at home. No fever, + chills. Notes that it kind of feels like when she has had a SBO in the past.  Pt notes that all of her care is at [facility], but that she was admitted to [facility] about a month ago. Symptoms moderately severe, no modifying factors noted. No urinary symptoms. PMHx: high cholesterol, hypothyroid, pancreatitis, type I DM  PSx: pancreactomy, cholecystectomy, hysterectomy, splenectomy, surgery for SBO, islet cell transplant  Social: former smoker. Former social alcohol. No marijuana use. Currently on disability.            Past Medical History:   Diagnosis Date    Anxiety     Depression     Diabetes [address] [address]     Hypercholesterolemia     Hypothyroid     Ill-defined condition     pancreatitis 2015    Liver disease     \"liver failure\" 2015    Long term (current) use of anticoagulants     SBO (small bowel obstruction) (HCC)     Thromboembolus (HCC)        Past Surgical History:   Procedure Laterality Date    HX CHOLECYSTECTOMY      HX HYSTERECTOMY      HX SPLENECTOMY      FL ABDOMEN SURGERY PROC UNLISTED      Pt stated \"I have no pancreas\"          Family History:   Problem Relation Age of Onset    Cancer Other     Hypertension Other     Cancer Mother     Cancer Maternal Aunt     Cancer Maternal Grandmother        Social History     Socioeconomic History    Marital status:      Spouse name: Not on file    Number of children: Not on file    Years of education: Not on file    Highest education level: Not on file   Occupational History    Not on file   Tobacco Use    Smoking status: Former Smoker     Packs/day: 1.00     Types: Cigarettes    Smokeless tobacco: Never Used   Substance and Sexual Activity    Alcohol use: No     Comment: pt 30 days clean    Drug use: No    Sexual activity: Yes     Partners: Male     Birth control/protection: Pill   Other Topics Concern    Not on file   Social History Narrative    Not on file     Social Determinants of Health     Financial Resource Strain:     Difficulty of Paying Living Expenses:    Food Insecurity:     Worried About Running Out of Food in the Last Year:     [address] in the Last Year:    Transportation Needs:     Lack of Transportation (Medical):  Lack of Transportation (Non-Medical):    Physical Activity:     Days of Exercise per Week:     Minutes of Exercise per Session:    Stress:     Feeling of Stress :    Social Connections:     Frequency of Communication with Friends and Family:     Frequency of Social Gatherings with Friends and Family:     Attends Hinduism Services:     Active Member of Clubs or Organizations:     Attends Club or Organization Meetings:     Marital Status:    Intimate Partner Violence:     Fear of Current or Ex-Partner:     Emotionally Abused:     Physically Abused:     Sexually Abused: ALLERGIES: Patient has no known allergies. Review of Systems   Constitutional: Negative for fever. HENT: Negative for facial swelling. Respiratory: Negative for shortness of breath. Cardiovascular: Negative for chest pain. Gastrointestinal: Positive for abdominal pain, diarrhea, nausea and vomiting. Genitourinary: Negative for dysuria. Skin: Negative for wound. Neurological: Negative for syncope. All other systems reviewed and are negative. Vitals:    09/14/21 2141   BP: (!) 152/89   Pulse: 74   Resp: 17   Temp: 98 °F (36.7 °C)   SpO2: 98%            Physical Exam  Vitals and nursing note reviewed. Constitutional:       General: She is not in acute distress. Appearance: She is well-developed. Comments: Pleasant white female, appears mildly uncomfortable   HENT:      Head: Normocephalic and atraumatic.       Right Ear: External ear normal.      Left Ear: External ear normal.   Eyes:      General: No scleral icterus. Conjunctiva/sclera: Conjunctivae normal.   Neck:      Trachea: No tracheal deviation. Cardiovascular:      Rate and Rhythm: Normal rate and regular rhythm. Heart sounds: Normal heart sounds. No murmur heard. No friction rub. No gallop. Pulmonary:      Effort: Pulmonary effort is normal. No respiratory distress. Breath sounds: Normal breath sounds. No stridor. No wheezing. Abdominal:      General: There is no distension. Palpations: Abdomen is soft. Tenderness: There is abdominal tenderness in the left upper quadrant. Musculoskeletal:         General: Normal range of motion. Cervical back: Neck supple. Skin:     General: Skin is warm and dry. Neurological:      Mental Status: She is alert and oriented to person, place, and time. Psychiatric:         Behavior: Behavior normal.          MDM  Number of Diagnoses or Management Options  Dehydration  Hyperglycemia  Non-intractable vomiting with nausea, unspecified vomiting type  Type 1 diabetes mellitus with hyperglycemia ([address]  Diagnosis management comments: 21-year-old female with history of type 1 diabetes, extensive abdominal surgical history, presenting to the ED for abdominal pain, nausea, vomiting, diarrhea. Given surgical history, CT scan ordered which showed no acute process. Overall reassuring labs with the exception of hyperglycemia with normal anion gap and serum bicarb. Patient feeling much better after medications and fluids in the ED, discussed symptomatic care at home, specific return precautions given.        Amount and/or Complexity of Data Reviewed  Clinical lab tests: ordered and reviewed  Tests in the radiology section of CPT®: ordered and reviewed  Discuss the patient with other providers: yes (Dr. Draa Melo, ED attending)           Procedures            Patient reassessed, appears to be feeling much better. Notes that nausea has nearly resolved but that she still has relatively significant pain, additional medications ordered. Discussed all results up until this point.   ELISABETH Bautista  12:27 AM

## 2021-09-16 LAB
ATRIAL RATE: 66 BPM
CALCULATED P AXIS, ECG09: 50 DEGREES
CALCULATED R AXIS, ECG10: 73 DEGREES
CALCULATED T AXIS, ECG11: 62 DEGREES
DIAGNOSIS, 93000: NORMAL
P-R INTERVAL, ECG05: 132 MS
Q-T INTERVAL, ECG07: 422 MS
QRS DURATION, ECG06: 80 MS
QTC CALCULATION (BEZET), ECG08: 442 MS
VENTRICULAR RATE, ECG03: 66 BPM

## 2023-02-02 ENCOUNTER — APPOINTMENT (OUTPATIENT)
Dept: CT IMAGING | Age: 48
DRG: 073 | End: 2023-02-02
Attending: NURSE PRACTITIONER
Payer: COMMERCIAL

## 2023-02-02 ENCOUNTER — HOSPITAL ENCOUNTER (INPATIENT)
Age: 48
LOS: 6 days | Discharge: HOME | DRG: 073 | End: 2023-02-08
Attending: EMERGENCY MEDICINE | Admitting: HOSPITALIST
Payer: COMMERCIAL

## 2023-02-02 DIAGNOSIS — R11.2 INTRACTABLE VOMITING WITH NAUSEA: Primary | ICD-10-CM

## 2023-02-02 DIAGNOSIS — E10.65 TYPE 1 DIABETES MELLITUS WITH HYPERGLYCEMIA (HCC): ICD-10-CM

## 2023-02-02 DIAGNOSIS — E10.69 TYPE 1 DIABETES MELLITUS WITH OTHER SPECIFIED COMPLICATION (HCC): ICD-10-CM

## 2023-02-02 DIAGNOSIS — R73.9 HYPERGLYCEMIA: ICD-10-CM

## 2023-02-02 DIAGNOSIS — R44.3 HALLUCINATIONS: ICD-10-CM

## 2023-02-02 DIAGNOSIS — R41.0 CONFUSION: ICD-10-CM

## 2023-02-02 PROBLEM — E10.9 DM I (DIABETES MELLITUS, TYPE I) (HCC): Status: ACTIVE | Noted: 2023-02-02

## 2023-02-02 LAB
ALBUMIN SERPL-MCNC: 3.7 G/DL (ref 3.5–5)
ALBUMIN/GLOB SERPL: 1.1 (ref 1.1–2.2)
ALP SERPL-CCNC: 86 U/L (ref 45–117)
ALT SERPL-CCNC: 71 U/L (ref 12–78)
ANION GAP SERPL CALC-SCNC: 8 MMOL/L (ref 5–15)
APPEARANCE UR: CLEAR
AST SERPL-CCNC: 62 U/L (ref 15–37)
BACTERIA URNS QL MICRO: NEGATIVE /HPF
BASOPHILS # BLD: 0.1 K/UL (ref 0–0.1)
BASOPHILS NFR BLD: 1 % (ref 0–1)
BILIRUB SERPL-MCNC: 0.5 MG/DL (ref 0.2–1)
BILIRUB UR QL: NEGATIVE
BUN SERPL-MCNC: 8 MG/DL (ref 6–20)
BUN/CREAT SERPL: 10 (ref 12–20)
CALCIUM SERPL-MCNC: 8.9 MG/DL (ref 8.5–10.1)
CHLORIDE SERPL-SCNC: 103 MMOL/L (ref 97–108)
CO2 SERPL-SCNC: 25 MMOL/L (ref 21–32)
COLOR UR: ABNORMAL
CREAT SERPL-MCNC: 0.84 MG/DL (ref 0.55–1.02)
DIFFERENTIAL METHOD BLD: NORMAL
EOSINOPHIL # BLD: 0.2 K/UL (ref 0–0.4)
EOSINOPHIL NFR BLD: 3 % (ref 0–7)
EPITH CASTS URNS QL MICRO: ABNORMAL /LPF
ERYTHROCYTE [DISTWIDTH] IN BLOOD BY AUTOMATED COUNT: 13.6 % (ref 11.5–14.5)
GLOBULIN SER CALC-MCNC: 3.5 G/DL (ref 2–4)
GLUCOSE BLD STRIP.AUTO-MCNC: 95 MG/DL (ref 65–117)
GLUCOSE SERPL-MCNC: 300 MG/DL (ref 65–100)
GLUCOSE UR STRIP.AUTO-MCNC: >1000 MG/DL
HCT VFR BLD AUTO: 35.1 % (ref 35–47)
HGB BLD-MCNC: 12.1 G/DL (ref 11.5–16)
HGB UR QL STRIP: NEGATIVE
IMM GRANULOCYTES # BLD AUTO: 0 K/UL (ref 0–0.04)
IMM GRANULOCYTES NFR BLD AUTO: 0 % (ref 0–0.5)
KETONES UR QL STRIP.AUTO: NEGATIVE MG/DL
LEUKOCYTE ESTERASE UR QL STRIP.AUTO: ABNORMAL
LYMPHOCYTES # BLD: 2.3 K/UL (ref 0.8–3.5)
LYMPHOCYTES NFR BLD: 30 % (ref 12–49)
MCH RBC QN AUTO: 30.4 PG (ref 26–34)
MCHC RBC AUTO-ENTMCNC: 34.5 G/DL (ref 30–36.5)
MCV RBC AUTO: 88.2 FL (ref 80–99)
MONOCYTES # BLD: 0.5 K/UL (ref 0–1)
MONOCYTES NFR BLD: 7 % (ref 5–13)
NEUTS SEG # BLD: 4.5 K/UL (ref 1.8–8)
NEUTS SEG NFR BLD: 59 % (ref 32–75)
NITRITE UR QL STRIP.AUTO: NEGATIVE
NRBC # BLD: 0 K/UL (ref 0–0.01)
NRBC BLD-RTO: 0 PER 100 WBC
PH UR STRIP: 5.5 (ref 5–8)
PLATELET # BLD AUTO: 389 K/UL (ref 150–400)
PMV BLD AUTO: 10.8 FL (ref 8.9–12.9)
POTASSIUM SERPL-SCNC: 4.9 MMOL/L (ref 3.5–5.1)
PROT SERPL-MCNC: 7.2 G/DL (ref 6.4–8.2)
PROT UR STRIP-MCNC: NEGATIVE MG/DL
RBC # BLD AUTO: 3.98 M/UL (ref 3.8–5.2)
RBC #/AREA URNS HPF: ABNORMAL /HPF (ref 0–5)
SERVICE CMNT-IMP: NORMAL
SODIUM SERPL-SCNC: 136 MMOL/L (ref 136–145)
SP GR UR REFRACTOMETRY: 1.01 (ref 1–1.03)
TROPONIN I SERPL HS-MCNC: 5 NG/L (ref 0–51)
UA: UC IF INDICATED,UAUC: ABNORMAL
UROBILINOGEN UR QL STRIP.AUTO: 0.2 EU/DL (ref 0.2–1)
WBC # BLD AUTO: 7.6 K/UL (ref 3.6–11)
WBC URNS QL MICRO: ABNORMAL /HPF (ref 0–4)

## 2023-02-02 PROCEDURE — 74011250636 HC RX REV CODE- 250/636: Performed by: NURSE PRACTITIONER

## 2023-02-02 PROCEDURE — 96375 TX/PRO/DX INJ NEW DRUG ADDON: CPT

## 2023-02-02 PROCEDURE — 80053 COMPREHEN METABOLIC PANEL: CPT

## 2023-02-02 PROCEDURE — 96374 THER/PROPH/DIAG INJ IV PUSH: CPT

## 2023-02-02 PROCEDURE — 84484 ASSAY OF TROPONIN QUANT: CPT

## 2023-02-02 PROCEDURE — 36415 COLL VENOUS BLD VENIPUNCTURE: CPT

## 2023-02-02 PROCEDURE — 99285 EMERGENCY DEPT VISIT HI MDM: CPT

## 2023-02-02 PROCEDURE — 96361 HYDRATE IV INFUSION ADD-ON: CPT

## 2023-02-02 PROCEDURE — 85025 COMPLETE CBC W/AUTO DIFF WBC: CPT

## 2023-02-02 PROCEDURE — 82962 GLUCOSE BLOOD TEST: CPT

## 2023-02-02 PROCEDURE — 81001 URINALYSIS AUTO W/SCOPE: CPT

## 2023-02-02 PROCEDURE — 65270000032 HC RM SEMIPRIVATE

## 2023-02-02 PROCEDURE — 74011250636 HC RX REV CODE- 250/636: Performed by: EMERGENCY MEDICINE

## 2023-02-02 PROCEDURE — 74011000636 HC RX REV CODE- 636: Performed by: EMERGENCY MEDICINE

## 2023-02-02 PROCEDURE — 74177 CT ABD & PELVIS W/CONTRAST: CPT

## 2023-02-02 RX ORDER — ONDANSETRON 2 MG/ML
4 INJECTION INTRAMUSCULAR; INTRAVENOUS ONCE
Status: COMPLETED | OUTPATIENT
Start: 2023-02-02 | End: 2023-02-02

## 2023-02-02 RX ORDER — MORPHINE SULFATE 4 MG/ML
4 INJECTION INTRAVENOUS ONCE
Status: COMPLETED | OUTPATIENT
Start: 2023-02-02 | End: 2023-02-02

## 2023-02-02 RX ORDER — ONDANSETRON 2 MG/ML
4 INJECTION INTRAMUSCULAR; INTRAVENOUS
Status: COMPLETED | OUTPATIENT
Start: 2023-02-02 | End: 2023-02-02

## 2023-02-02 RX ORDER — SODIUM CHLORIDE 9 MG/ML
75 INJECTION, SOLUTION INTRAVENOUS ONCE
Status: COMPLETED | OUTPATIENT
Start: 2023-02-02 | End: 2023-02-02

## 2023-02-02 RX ADMIN — ONDANSETRON 4 MG: 2 INJECTION INTRAMUSCULAR; INTRAVENOUS at 16:07

## 2023-02-02 RX ADMIN — SODIUM CHLORIDE 75 ML/HR: 9 INJECTION, SOLUTION INTRAVENOUS at 19:31

## 2023-02-02 RX ADMIN — SODIUM CHLORIDE 1000 ML: 9 INJECTION, SOLUTION INTRAVENOUS at 16:08

## 2023-02-02 RX ADMIN — ONDANSETRON HYDROCHLORIDE 4 MG: 2 SOLUTION INTRAMUSCULAR; INTRAVENOUS at 20:49

## 2023-02-02 RX ADMIN — MORPHINE SULFATE 4 MG: 4 INJECTION INTRAVENOUS at 17:38

## 2023-02-02 RX ADMIN — IOPAMIDOL 100 ML: 755 INJECTION, SOLUTION INTRAVENOUS at 16:50

## 2023-02-02 NOTE — ED PROVIDER NOTES
This is a 26-year-old female who presents ambulatory to the emergency room with complaints of nausea, vomiting, diarrhea for several days. Patient states she has abdominal pain located in the epigastric region. 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 Patient states today she has had multiple episodes of vomiting stating \"I do not feel good at all. \"  Denies any chest pain, shortness of breath, dizziness, headaches, fevers or chills. Usually receives her medical care at [facility]. Has not taken any medication prior to arrival for symptoms. There are no further complaints at this time.              Past Medical History:   Diagnosis Date    Anxiety     Depression     Diabetes ([facility])     Hypercholesterolemia     Hypothyroid     Ill-defined condition     pancreatitis 2015    Liver disease     \"liver failure\" 2015    Long term (current) use of anticoagulants     SBO (small bowel obstruction) (HCC)     Thromboembolus (HCC)        Past Surgical History:   Procedure Laterality Date    HX CHOLECYSTECTOMY      HX HYSTERECTOMY      HX SPLENECTOMY      FL UNLISTED PROCEDURE ABDOMEN PERITONEUM & OMENTUM      Pt stated \"I have no pancreas\"          Family History:   Problem Relation Age of Onset    Cancer Other     Hypertension Other     Cancer Mother     Cancer Maternal Aunt     Cancer Maternal Grandmother        Social History     Socioeconomic History    Marital status:      Spouse name: Not on file    Number of children: Not on file    Years of education: Not on file    Highest education level: Not on file   Occupational History    Not on file   Tobacco Use    Smoking status: Former     Packs/day: 1.00     Types: Cigarettes    Smokeless tobacco: Never   Substance and Sexual Activity    Alcohol use: No     Comment: pt 30 days clean    Drug use: No    Sexual activity: Yes     Partners: Male Birth control/protection: Pill   Other Topics Concern    Not on file   Social History Narrative    Not on file     Social Determinants of Health     Financial Resource Strain: Not on file   Food Insecurity: Not on file   Transportation Needs: Not on file   Physical Activity: Not on file   Stress: Not on file   Social Connections: Not on file   Intimate Partner Violence: Not on file   Housing Stability: Not on file         ALLERGIES: Patient has no known allergies. Review of Systems   Constitutional:  Positive for fatigue. Negative for appetite change, chills, diaphoresis and fever. HENT:  Negative for congestion, ear discharge, ear pain, sinus pressure, sinus pain, sore throat and trouble swallowing. Eyes:  Negative for photophobia, pain, redness and visual disturbance. Respiratory:  Negative for chest tightness, shortness of breath and wheezing. Cardiovascular:  Negative for chest pain and palpitations. Gastrointestinal:  Positive for abdominal pain, diarrhea, nausea and vomiting. Negative for abdominal distention. Endocrine: Negative. Genitourinary:  Negative for difficulty urinating, flank pain, frequency and urgency. Musculoskeletal:  Negative for back pain, neck pain and neck stiffness. Skin:  Negative for color change, pallor, rash and wound. Allergic/Immunologic: Negative. Neurological:  Negative for dizziness, speech difficulty, weakness and headaches. Hematological:  Does not bruise/bleed easily. Psychiatric/Behavioral:  Negative for behavioral problems. The patient is not nervous/anxious. Vitals:    02/02/23 1523   BP: 126/76   Pulse: 77   Resp: 16   Temp: 98.4 °F (36.9 °C)   SpO2: 97%   Weight: 65.2 kg (143 lb 11.8 oz)            Physical Exam  Vitals and nursing note reviewed. Constitutional:       General: She is not in acute distress. Appearance: Normal appearance. She is well-developed. She is not ill-appearing.    HENT:      Head: Normocephalic and atraumatic. Right Ear: External ear normal.      Left Ear: External ear normal.      Nose: Nose normal. No congestion. Mouth/Throat:      Mouth: Mucous membranes are moist.   Eyes:      General:         Right eye: No discharge. Left eye: No discharge. Conjunctiva/sclera: Conjunctivae normal.      Pupils: Pupils are equal, round, and reactive to light. Neck:      Vascular: No JVD. Trachea: No tracheal deviation. Cardiovascular:      Rate and Rhythm: Normal rate and regular rhythm. Pulses: Normal pulses. Heart sounds: Normal heart sounds. No murmur heard. No gallop. Pulmonary:      Effort: Pulmonary effort is normal. No respiratory distress. Breath sounds: Normal breath sounds. Chest:      Chest wall: No tenderness. Abdominal:      General: Bowel sounds are normal. There is no distension. Palpations: Abdomen is soft. Tenderness: There is abdominal tenderness. There is no right CVA tenderness, left CVA tenderness, guarding or rebound. Genitourinary:     Comments: Negative    Musculoskeletal:         General: No tenderness. Normal range of motion. Cervical back: Normal range of motion and neck supple. No tenderness. Skin:     General: Skin is warm and dry. Capillary Refill: Capillary refill takes less than 2 seconds. Coloration: Skin is not pale. Findings: No erythema or rash. Neurological:      General: No focal deficit present. Mental Status: She is alert and oriented to person, place, and time. Motor: No weakness. Coordination: Coordination normal.   Psychiatric:         Mood and Affect: Mood normal.         Behavior: Behavior normal.         Thought Content: Thought content normal.         Judgment: Judgment normal.        Medical Decision Making  Differential diagnosis includes: SBO, Gastroenteritis, GERD, MI and others.     This is a 49year old female who presents to the emergency room with complaints of epigastric pain, nausea and vomiting as well as diarrhea. After initial assessment the following was completed:    1. Previous notes (external to Emergency room) were reviewed: chart review    2. History was obtained by: patient and sister    3. Chronic medical issues affecting this visit:   Past Medical History:  No date: Anxiety  No date: Depression  No date: Diabetes ([facility]  No date: Hypercholesterolemia  No date: Hypothyroid  No date: Ill-defined condition      Comment:  pancreatitis 2015  No date: Liver disease      Comment:  \"liver failure\" 2015  No date: Long term (current) use of anticoagulants  No date: SBO (small bowel obstruction) ([facility] [address]  No date: [facility]  Past Surgical History:  No date: HX CHOLECYSTECTOMY  No date: HX HYSTERECTOMY  No date: HX SPLENECTOMY  No date: DC UNLISTED PROCEDURE ABDOMEN PERITONEUM & OMENTUM      Comment:  Pt stated \"I have no pancreas\"       4. I ordered the following tests, followed by review of final reads by lab and radiology: CBC, comp met, troponin, CT abdomen and pelvis. 5. I considered ordering: As above    6. Any intervention/ surgery needed: After physical examination and review of imaging and laboratory data, patient was reassessed. IV fluids, monitoring of glucose, antiemetics. Admit to the hospitalist service for further evaluation and treatment. 7. Social determinants of health: None    8 Final diagnosis: Hyperglycemia, intractable nausea and vomiting. Medications prescribed: IV fluids, Zofran. 9. Disposition: Admit to the hospitalist service. Problems Addressed:  Hyperglycemia: acute illness or injury  Intractable vomiting with nausea: acute illness or injury    Amount and/or Complexity of Data Reviewed  Independent Historian: friend     Details: sister  External Data Reviewed: notes. Labs: ordered. Radiology: ordered. Risk  OTC drugs. Prescription drug management. Decision regarding hospitalization.          Labs Reviewed METABOLIC PANEL, COMPREHENSIVE - Abnormal; Notable for the following components:       Result Value    Glucose 300 (*)     BUN/Creatinine ratio 10 (*)     AST (SGOT) 62 (*)     All other components within normal limits   CBC WITH AUTOMATED DIFF   TROPONIN-HIGH SENSITIVITY   SAMPLES BEING HELD   URINALYSIS W/ REFLEX CULTURE     CT ABD PELV W CONT    Result Date: 2/2/2023  1. No acute abdominal or pelvic pathology. 2. Extensive postsurgical changes, not significantly changed. Perfect Serve Consult for Admission  6:53 PM    ED Room Number: SER09/09  Patient Name and age:  Nick Falcon 49 y.o.  female  Working Diagnosis:   1. Intractable vomiting with nausea    2. Hyperglycemia        COVID-19 Suspicion:  no  Sepsis present:  no  Reassessment needed: no  Code Status:  Full Code  Readmission: no  Isolation Requirements:  no  Recommended Level of Care:  med/surg  Department: [facility] Adult ED - [phone]    Other:  49year old female with intractable nausea and vomiting, hx. Type 1 diabetes. Blood sugar 300, 10 units given. Total critical care time spent exclusive of procedures:  45 minutes. Procedures      I reviewed with the LUDWIG the medical history and the LUDWIG's findings on the physical examination. I discussed with the LUDWIG the patient's diagnosis and concur with the plan.      Deepali Fermin MD

## 2023-02-03 LAB
ALBUMIN SERPL-MCNC: 3.1 G/DL (ref 3.5–5)
ALBUMIN/GLOB SERPL: 1.1 (ref 1.1–2.2)
ALP SERPL-CCNC: 65 U/L (ref 45–117)
ALT SERPL-CCNC: 51 U/L (ref 12–78)
ANION GAP SERPL CALC-SCNC: 5 MMOL/L (ref 5–15)
AST SERPL-CCNC: 24 U/L (ref 15–37)
BILIRUB SERPL-MCNC: 0.4 MG/DL (ref 0.2–1)
BUN SERPL-MCNC: 6 MG/DL (ref 6–20)
BUN/CREAT SERPL: 9 (ref 12–20)
CALCIUM SERPL-MCNC: 8.5 MG/DL (ref 8.5–10.1)
CHLORIDE SERPL-SCNC: 112 MMOL/L (ref 97–108)
CO2 SERPL-SCNC: 27 MMOL/L (ref 21–32)
CREAT SERPL-MCNC: 0.7 MG/DL (ref 0.55–1.02)
ERYTHROCYTE [DISTWIDTH] IN BLOOD BY AUTOMATED COUNT: 13.8 % (ref 11.5–14.5)
EST. AVERAGE GLUCOSE BLD GHB EST-MCNC: 186 MG/DL
GLOBULIN SER CALC-MCNC: 2.9 G/DL (ref 2–4)
GLUCOSE BLD STRIP.AUTO-MCNC: 116 MG/DL (ref 65–117)
GLUCOSE BLD STRIP.AUTO-MCNC: 126 MG/DL (ref 65–117)
GLUCOSE BLD STRIP.AUTO-MCNC: 56 MG/DL (ref 65–117)
GLUCOSE BLD STRIP.AUTO-MCNC: 78 MG/DL (ref 65–117)
GLUCOSE BLD STRIP.AUTO-MCNC: 92 MG/DL (ref 65–117)
GLUCOSE BLD STRIP.AUTO-MCNC: 94 MG/DL (ref 65–117)
GLUCOSE SERPL-MCNC: 77 MG/DL (ref 65–100)
HBA1C MFR BLD: 8.1 % (ref 4–5.6)
HCT VFR BLD AUTO: 32.3 % (ref 35–47)
HGB BLD-MCNC: 11 G/DL (ref 11.5–16)
LIPASE SERPL-CCNC: 27 U/L (ref 73–393)
MAGNESIUM SERPL-MCNC: 2 MG/DL (ref 1.6–2.4)
MCH RBC QN AUTO: 30.6 PG (ref 26–34)
MCHC RBC AUTO-ENTMCNC: 34.1 G/DL (ref 30–36.5)
MCV RBC AUTO: 89.7 FL (ref 80–99)
NRBC # BLD: 0 K/UL (ref 0–0.01)
NRBC BLD-RTO: 0 PER 100 WBC
PLATELET # BLD AUTO: 439 K/UL (ref 150–400)
PMV BLD AUTO: 10.3 FL (ref 8.9–12.9)
POTASSIUM SERPL-SCNC: 2.9 MMOL/L (ref 3.5–5.1)
PROT SERPL-MCNC: 6 G/DL (ref 6.4–8.2)
RBC # BLD AUTO: 3.6 M/UL (ref 3.8–5.2)
SERVICE CMNT-IMP: ABNORMAL
SERVICE CMNT-IMP: ABNORMAL
SERVICE CMNT-IMP: NORMAL
SODIUM SERPL-SCNC: 144 MMOL/L (ref 136–145)
WBC # BLD AUTO: 7.8 K/UL (ref 3.6–11)

## 2023-02-03 PROCEDURE — 83735 ASSAY OF MAGNESIUM: CPT

## 2023-02-03 PROCEDURE — 74011250637 HC RX REV CODE- 250/637: Performed by: HOSPITALIST

## 2023-02-03 PROCEDURE — 74011000250 HC RX REV CODE- 250: Performed by: STUDENT IN AN ORGANIZED HEALTH CARE EDUCATION/TRAINING PROGRAM

## 2023-02-03 PROCEDURE — 80053 COMPREHEN METABOLIC PANEL: CPT

## 2023-02-03 PROCEDURE — 99221 1ST HOSP IP/OBS SF/LOW 40: CPT | Performed by: CLINICAL NURSE SPECIALIST

## 2023-02-03 PROCEDURE — 74011250637 HC RX REV CODE- 250/637: Performed by: STUDENT IN AN ORGANIZED HEALTH CARE EDUCATION/TRAINING PROGRAM

## 2023-02-03 PROCEDURE — 82962 GLUCOSE BLOOD TEST: CPT

## 2023-02-03 PROCEDURE — 74011250636 HC RX REV CODE- 250/636: Performed by: STUDENT IN AN ORGANIZED HEALTH CARE EDUCATION/TRAINING PROGRAM

## 2023-02-03 PROCEDURE — 83690 ASSAY OF LIPASE: CPT

## 2023-02-03 PROCEDURE — 36415 COLL VENOUS BLD VENIPUNCTURE: CPT

## 2023-02-03 PROCEDURE — 74011250636 HC RX REV CODE- 250/636: Performed by: HOSPITALIST

## 2023-02-03 PROCEDURE — 83036 HEMOGLOBIN GLYCOSYLATED A1C: CPT

## 2023-02-03 PROCEDURE — 85027 COMPLETE CBC AUTOMATED: CPT

## 2023-02-03 PROCEDURE — 65270000032 HC RM SEMIPRIVATE

## 2023-02-03 RX ORDER — LORAZEPAM 0.5 MG/1
0.5 TABLET ORAL 2 TIMES DAILY
COMMUNITY

## 2023-02-03 RX ORDER — PANTOPRAZOLE SODIUM 40 MG/1
40 TABLET, DELAYED RELEASE ORAL 2 TIMES DAILY
COMMUNITY

## 2023-02-03 RX ORDER — BUPROPION HYDROCHLORIDE 150 MG/1
150 TABLET, EXTENDED RELEASE ORAL DAILY
Status: DISCONTINUED | OUTPATIENT
Start: 2023-02-03 | End: 2023-02-03

## 2023-02-03 RX ORDER — ATORVASTATIN CALCIUM 40 MG/1
40 TABLET, FILM COATED ORAL
Status: DISCONTINUED | OUTPATIENT
Start: 2023-02-03 | End: 2023-02-08 | Stop reason: HOSPADM

## 2023-02-03 RX ORDER — SERTRALINE HYDROCHLORIDE 50 MG/1
50 TABLET, FILM COATED ORAL DAILY
Status: DISCONTINUED | OUTPATIENT
Start: 2023-02-03 | End: 2023-02-08 | Stop reason: HOSPADM

## 2023-02-03 RX ORDER — ONDANSETRON 4 MG/1
4 TABLET, ORALLY DISINTEGRATING ORAL
Status: DISCONTINUED | OUTPATIENT
Start: 2023-02-03 | End: 2023-02-08 | Stop reason: HOSPADM

## 2023-02-03 RX ORDER — DULOXETIN HYDROCHLORIDE 60 MG/1
60 CAPSULE, DELAYED RELEASE ORAL DAILY
Status: DISCONTINUED | OUTPATIENT
Start: 2023-02-04 | End: 2023-02-08 | Stop reason: HOSPADM

## 2023-02-03 RX ORDER — GABAPENTIN 300 MG/1
300 CAPSULE ORAL 3 TIMES DAILY
Status: DISCONTINUED | OUTPATIENT
Start: 2023-02-03 | End: 2023-02-03

## 2023-02-03 RX ORDER — CIPROFLOXACIN 500 MG/1
500 TABLET ORAL
COMMUNITY
End: 2023-02-08

## 2023-02-03 RX ORDER — INSULIN LISPRO 100 [IU]/ML
INJECTION, SOLUTION INTRAVENOUS; SUBCUTANEOUS EVERY 6 HOURS
Status: DISCONTINUED | OUTPATIENT
Start: 2023-02-03 | End: 2023-02-06

## 2023-02-03 RX ORDER — ACETAMINOPHEN 650 MG/1
650 SUPPOSITORY RECTAL
Status: DISCONTINUED | OUTPATIENT
Start: 2023-02-03 | End: 2023-02-08 | Stop reason: HOSPADM

## 2023-02-03 RX ORDER — HYDROMORPHONE HYDROCHLORIDE 1 MG/ML
1-2 INJECTION, SOLUTION INTRAMUSCULAR; INTRAVENOUS; SUBCUTANEOUS
Status: DISCONTINUED | OUTPATIENT
Start: 2023-02-03 | End: 2023-02-04

## 2023-02-03 RX ORDER — FOLIC ACID 1 MG/1
1 TABLET ORAL DAILY
Status: DISCONTINUED | OUTPATIENT
Start: 2023-02-03 | End: 2023-02-08 | Stop reason: HOSPADM

## 2023-02-03 RX ORDER — SODIUM CHLORIDE 0.9 % (FLUSH) 0.9 %
5-40 SYRINGE (ML) INJECTION AS NEEDED
Status: DISCONTINUED | OUTPATIENT
Start: 2023-02-03 | End: 2023-02-08 | Stop reason: HOSPADM

## 2023-02-03 RX ORDER — GABAPENTIN 300 MG/1
600 CAPSULE ORAL 4 TIMES DAILY
Status: DISCONTINUED | OUTPATIENT
Start: 2023-02-03 | End: 2023-02-08 | Stop reason: HOSPADM

## 2023-02-03 RX ORDER — MORPHINE SULFATE 2 MG/ML
1-2 INJECTION, SOLUTION INTRAMUSCULAR; INTRAVENOUS
Status: DISCONTINUED | OUTPATIENT
Start: 2023-02-03 | End: 2023-02-03

## 2023-02-03 RX ORDER — POLYETHYLENE GLYCOL 3350 17 G/17G
17 POWDER, FOR SOLUTION ORAL DAILY PRN
Status: DISCONTINUED | OUTPATIENT
Start: 2023-02-03 | End: 2023-02-08 | Stop reason: HOSPADM

## 2023-02-03 RX ORDER — SODIUM CHLORIDE 9 MG/ML
100 INJECTION, SOLUTION INTRAVENOUS CONTINUOUS
Status: DISCONTINUED | OUTPATIENT
Start: 2023-02-03 | End: 2023-02-08 | Stop reason: HOSPADM

## 2023-02-03 RX ORDER — SODIUM CHLORIDE 0.9 % (FLUSH) 0.9 %
5-40 SYRINGE (ML) INJECTION EVERY 8 HOURS
Status: DISCONTINUED | OUTPATIENT
Start: 2023-02-03 | End: 2023-02-08 | Stop reason: HOSPADM

## 2023-02-03 RX ORDER — LUBIPROSTONE 24 UG/1
24 CAPSULE, GELATIN COATED ORAL
COMMUNITY

## 2023-02-03 RX ORDER — POTASSIUM CHLORIDE 7.45 MG/ML
10 INJECTION INTRAVENOUS
Status: COMPLETED | OUTPATIENT
Start: 2023-02-03 | End: 2023-02-03

## 2023-02-03 RX ORDER — LEVOTHYROXINE SODIUM 112 UG/1
112 TABLET ORAL
Status: DISCONTINUED | OUTPATIENT
Start: 2023-02-03 | End: 2023-02-08 | Stop reason: HOSPADM

## 2023-02-03 RX ORDER — ONDANSETRON 2 MG/ML
4 INJECTION INTRAMUSCULAR; INTRAVENOUS
Status: DISCONTINUED | OUTPATIENT
Start: 2023-02-03 | End: 2023-02-08 | Stop reason: HOSPADM

## 2023-02-03 RX ORDER — ACETAMINOPHEN 325 MG/1
650 TABLET ORAL
Status: DISCONTINUED | OUTPATIENT
Start: 2023-02-03 | End: 2023-02-08 | Stop reason: HOSPADM

## 2023-02-03 RX ORDER — BUPROPION HYDROCHLORIDE 100 MG/1
200 TABLET, EXTENDED RELEASE ORAL 2 TIMES DAILY
Status: DISCONTINUED | OUTPATIENT
Start: 2023-02-03 | End: 2023-02-04

## 2023-02-03 RX ORDER — IBUPROFEN 200 MG
4 TABLET ORAL AS NEEDED
Status: DISCONTINUED | OUTPATIENT
Start: 2023-02-03 | End: 2023-02-08 | Stop reason: HOSPADM

## 2023-02-03 RX ORDER — ENOXAPARIN SODIUM 100 MG/ML
40 INJECTION SUBCUTANEOUS DAILY
Status: DISCONTINUED | OUTPATIENT
Start: 2023-02-03 | End: 2023-02-08 | Stop reason: HOSPADM

## 2023-02-03 RX ORDER — DICYCLOMINE HYDROCHLORIDE 20 MG/1
20 TABLET ORAL
Status: DISCONTINUED | OUTPATIENT
Start: 2023-02-03 | End: 2023-02-08 | Stop reason: HOSPADM

## 2023-02-03 RX ORDER — TRAZODONE HYDROCHLORIDE 100 MG/1
100 TABLET ORAL
Status: DISCONTINUED | OUTPATIENT
Start: 2023-02-03 | End: 2023-02-08 | Stop reason: HOSPADM

## 2023-02-03 RX ORDER — DULOXETIN HYDROCHLORIDE 60 MG/1
60 CAPSULE, DELAYED RELEASE ORAL DAILY
COMMUNITY

## 2023-02-03 RX ADMIN — PANCRELIPASE LIPASE, PANCRELIPASE PROTEASE, PANCRELIPASE AMYLASE 1 CAPSULE: 20000; 63000; 84000 CAPSULE, DELAYED RELEASE ORAL at 13:35

## 2023-02-03 RX ADMIN — SODIUM CHLORIDE 125 ML/HR: 9 INJECTION, SOLUTION INTRAVENOUS at 22:19

## 2023-02-03 RX ADMIN — HYDROMORPHONE HYDROCHLORIDE 2 MG: 1 INJECTION, SOLUTION INTRAMUSCULAR; INTRAVENOUS; SUBCUTANEOUS at 17:31

## 2023-02-03 RX ADMIN — MORPHINE SULFATE 2 MG: 2 INJECTION, SOLUTION INTRAMUSCULAR; INTRAVENOUS at 09:41

## 2023-02-03 RX ADMIN — PANCRELIPASE LIPASE, PANCRELIPASE PROTEASE, PANCRELIPASE AMYLASE 1 CAPSULE: 20000; 63000; 84000 CAPSULE, DELAYED RELEASE ORAL at 22:10

## 2023-02-03 RX ADMIN — POTASSIUM CHLORIDE 10 MEQ: 7.46 INJECTION, SOLUTION INTRAVENOUS at 09:07

## 2023-02-03 RX ADMIN — GABAPENTIN 600 MG: 300 CAPSULE ORAL at 17:32

## 2023-02-03 RX ADMIN — GABAPENTIN 600 MG: 300 CAPSULE ORAL at 22:10

## 2023-02-03 RX ADMIN — POTASSIUM CHLORIDE 10 MEQ: 7.46 INJECTION, SOLUTION INTRAVENOUS at 09:24

## 2023-02-03 RX ADMIN — MORPHINE SULFATE 2 MG: 2 INJECTION, SOLUTION INTRAMUSCULAR; INTRAVENOUS at 15:40

## 2023-02-03 RX ADMIN — SERTRALINE HYDROCHLORIDE 50 MG: 50 TABLET ORAL at 09:08

## 2023-02-03 RX ADMIN — MORPHINE SULFATE 2 MG: 2 INJECTION, SOLUTION INTRAMUSCULAR; INTRAVENOUS at 01:34

## 2023-02-03 RX ADMIN — TRAZODONE HYDROCHLORIDE 100 MG: 100 TABLET ORAL at 22:09

## 2023-02-03 RX ADMIN — PANCRELIPASE LIPASE, PANCRELIPASE PROTEASE, PANCRELIPASE AMYLASE 1 CAPSULE: 20000; 63000; 84000 CAPSULE, DELAYED RELEASE ORAL at 09:14

## 2023-02-03 RX ADMIN — LEVOTHYROXINE SODIUM 112 MCG: 0.11 TABLET ORAL at 06:33

## 2023-02-03 RX ADMIN — ATORVASTATIN CALCIUM 40 MG: 40 TABLET, FILM COATED ORAL at 22:10

## 2023-02-03 RX ADMIN — SODIUM CHLORIDE, PRESERVATIVE FREE 10 ML: 5 INJECTION INTRAVENOUS at 05:23

## 2023-02-03 RX ADMIN — BUPROPION HYDROCHLORIDE 150 MG: 150 TABLET, EXTENDED RELEASE ORAL at 09:08

## 2023-02-03 RX ADMIN — SODIUM CHLORIDE 125 ML/HR: 9 INJECTION, SOLUTION INTRAVENOUS at 02:11

## 2023-02-03 RX ADMIN — HYDROMORPHONE HYDROCHLORIDE 2 MG: 1 INJECTION, SOLUTION INTRAMUSCULAR; INTRAVENOUS; SUBCUTANEOUS at 22:10

## 2023-02-03 RX ADMIN — MORPHINE SULFATE 2 MG: 2 INJECTION, SOLUTION INTRAMUSCULAR; INTRAVENOUS at 06:33

## 2023-02-03 RX ADMIN — ONDANSETRON HYDROCHLORIDE 4 MG: 2 SOLUTION INTRAMUSCULAR; INTRAVENOUS at 14:44

## 2023-02-03 RX ADMIN — FOLIC ACID 1 MG: 1 TABLET ORAL at 09:08

## 2023-02-03 RX ADMIN — POTASSIUM CHLORIDE 10 MEQ: 7.46 INJECTION, SOLUTION INTRAVENOUS at 09:35

## 2023-02-03 RX ADMIN — ENOXAPARIN SODIUM 40 MG: 100 INJECTION SUBCUTANEOUS at 09:08

## 2023-02-03 RX ADMIN — SODIUM CHLORIDE, PRESERVATIVE FREE 10 ML: 5 INJECTION INTRAVENOUS at 22:09

## 2023-02-03 RX ADMIN — BUPROPION HYDROCHLORIDE 200 MG: 100 TABLET, EXTENDED RELEASE ORAL at 17:32

## 2023-02-03 RX ADMIN — DICYCLOMINE HYDROCHLORIDE 20 MG: 20 TABLET ORAL at 16:38

## 2023-02-03 RX ADMIN — MORPHINE SULFATE 2 MG: 2 INJECTION, SOLUTION INTRAMUSCULAR; INTRAVENOUS at 12:38

## 2023-02-03 RX ADMIN — ONDANSETRON HYDROCHLORIDE 4 MG: 2 SOLUTION INTRAMUSCULAR; INTRAVENOUS at 22:09

## 2023-02-03 RX ADMIN — TRAZODONE HYDROCHLORIDE 100 MG: 100 TABLET ORAL at 02:11

## 2023-02-03 RX ADMIN — GABAPENTIN 600 MG: 300 CAPSULE ORAL at 13:34

## 2023-02-03 RX ADMIN — GABAPENTIN 300 MG: 300 CAPSULE ORAL at 09:08

## 2023-02-03 RX ADMIN — ACETAMINOPHEN 650 MG: 325 TABLET ORAL at 16:37

## 2023-02-03 RX ADMIN — ONDANSETRON 4 MG: 4 TABLET, ORALLY DISINTEGRATING ORAL at 02:11

## 2023-02-03 NOTE — CONSULTS
S[address]  [address] [facility] [address] [place], [address]  [phone]                     GI CONSULTATION NOTE      NAME:  Matt Singleton   :   1975   MRN:   [id number]     Consult Date: 2/3/2023     Chief Complaint: nausea and vomiting     History of Present Illness:  Patient is a 49 y.o. female with PMH significant for pancreatectomy, DM, and gastroparesis who is seen in consultation at the request of Dr. Adamaris Solo for the above mentioned problem. Soy Tompkins  presented to [place] ER with complaints of abdominal pain, nausea and vomiting. Endorses hx of recurrent episodes of nausea requiring admission. Has also had some associated loose stools. Denies melena or hematochezia. She is followed closely by [facility] for significant issues with gastroparesis and post pancreatectomy complications. Symptoms are normally well controlled with home regimen of medication including Creon, compazine,and  zofran. EGD   Impression:             - Normal esophagus  - Z-line regular, 38 cm from the incisors. - A small amount of food (residue) in the stomach.   - Angelina-en-Y gastrojejunostomy with gastrojejunal anastomosis characterized by healthy appearing mucosa. Since the patient has had an antrectomy, botox injection was not performed. - No specimens collected.        PMH:  Past Medical History:   Diagnosis Date    Anxiety     Depression     Diabetes ([facility] [address]     Hypercholesterolemia     Hypothyroid     Ill-defined condition     pancreatitis     Liver disease     \"liver failure\"     Long term (current) use of anticoagulants     SBO (small bowel obstruction) (HCC)     Thromboembolus (HCC)        PSH:  Past Surgical History:   Procedure Laterality Date    HX CHOLECYSTECTOMY      HX HYSTERECTOMY      HX SPLENECTOMY      MD UNLISTED PROCEDURE ABDOMEN PERITONEUM & OMENTUM      Pt stated \"I have no pancreas\"        Allergies:  No Known Allergies    Home Medications:  Prior to Admission Medications Prescriptions Last Dose Informant Patient Reported? Taking? DULoxetine (CYMBALTA) 60 mg capsule   Yes Yes   Sig: Take 60 mg by mouth daily. LORazepam (ATIVAN) 0.5 mg tablet   Yes Yes   Sig: Take 0.5 mg by mouth two (2) times a day. Indications: anxious   amylase-lipase-protease (CREON) 24,000-76,000 -120,000 unit capsule   No No   Sig: Take 1 Cap by mouth three (3) times daily (with meals). atorvastatin (LIPITOR) 40 mg tablet   Yes No   Sig: Take 40 mg by mouth nightly. buPROPion SR (WELLBUTRIN SR) 150 mg SR tablet   Yes No   Sig: Take 450 mg by mouth daily. Indications: anxiousness associated with depression   ciprofloxacin HCl (CIPRO) 500 mg tablet   Yes Yes   Sig: Take 500 mg by mouth daily as needed. dicyclomine (BENTYL) 20 mg tablet   No No   Sig: Take 1 Tablet by mouth every six (6) hours as needed for Abdominal Cramps.   ergocalciferol (VITAMIN D2) 50,000 unit capsule   Yes No   Sig: Take 50,000 Units by mouth every seven (7) days. Patient not taking: Reported on 3324   folic acid (FOLVITE) 1 mg tablet   No No   Sig: Take 1 Tab by mouth daily. gabapentin (NEURONTIN) 300 mg capsule   Yes No   Sig: Take 600 mg by mouth four (4) times daily. Indications: neuropathic pain   insulin lispro (HUMALOG) 100 unit/mL injection   Yes No   Si Units by SubCUTAneous route Before breakfast, lunch, and dinner. levothyroxine (SYNTHROID) 112 mcg tablet   Yes No   Sig: Take 112 mcg by mouth Daily (before breakfast). lubiPROStone (AMITIZA) 24 mcg capsule   Yes Yes   Sig: Take 24 mcg by mouth daily (with breakfast). Indications: chronic idiopathic constipation   ondansetron (ZOFRAN ODT) 4 mg disintegrating tablet   No No   Sig: Take 1 Tab by mouth every eight (8) hours as needed for Nausea. ondansetron (ZOFRAN ODT) 4 mg disintegrating tablet   No No   Sig: Take 1 Tab by mouth every eight (8) hours as needed for Nausea.    pantoprazole (Protonix) 40 mg tablet   Yes Yes   Sig: Take 40 mg by mouth two (2) times a day. Indications: indigestion   prochlorperazine (Compazine) 10 mg tablet   No No   Sig: Take 1 Tablet by mouth every eight (8) hours as needed for Nausea. sertraline (ZOLOFT) 50 mg tablet   Yes No   Sig: Take 50 mg by mouth daily. traZODone (DESYREL) 100 mg tablet   Yes No   Sig: Take 200 mg by mouth nightly.  1/2 to 1 every night      Facility-Administered Medications: None       Hospital Medications:  Current Facility-Administered Medications   Medication Dose Route Frequency    lipase-protease-amylase (ZENPEP 20,000) capsule 1 Capsule  1 Capsule Oral TID WITH MEALS    atorvastatin (LIPITOR) tablet 40 mg  40 mg Oral QHS    dicyclomine (BENTYL) tablet 20 mg  20 mg Oral N2T PRN    folic acid (FOLVITE) tablet 1 mg  1 mg Oral DAILY    levothyroxine (SYNTHROID) tablet 112 mcg  112 mcg Oral ACB    sertraline (ZOLOFT) tablet 50 mg  50 mg Oral DAILY    traZODone (DESYREL) tablet 100 mg  100 mg Oral QHS    morphine injection 1-2 mg  1-2 mg IntraVENous Q3H PRN    sodium chloride (NS) flush 5-40 mL  5-40 mL IntraVENous Q8H    sodium chloride (NS) flush 5-40 mL  5-40 mL IntraVENous PRN    acetaminophen (TYLENOL) tablet 650 mg  650 mg Oral Q6H PRN    Or    acetaminophen (TYLENOL) suppository 650 mg  650 mg Rectal Q6H PRN    polyethylene glycol (MIRALAX) packet 17 g  17 g Oral DAILY PRN    ondansetron (ZOFRAN ODT) tablet 4 mg  4 mg Oral Q8H PRN    Or    ondansetron (ZOFRAN) injection 4 mg  4 mg IntraVENous Q6H PRN    enoxaparin (LOVENOX) injection 40 mg  40 mg SubCUTAneous DAILY    0.9% sodium chloride infusion  125 mL/hr IntraVENous CONTINUOUS    glucose chewable tablet 16 g  4 Tablet Oral PRN    glucagon (GLUCAGEN) injection 1 mg  1 mg IntraMUSCular PRN    dextrose 10 % infusion 0-250 mL  0-250 mL IntraVENous PRN    insulin lispro (HUMALOG) injection   SubCUTAneous Q6H    insulin pump (PATIENT SUPPLIED)   SubCUTAneous PRN    dextrose 10 % infusion 0-250 mL  0-250 mL IntraVENous PRN    buPROPion [facility] SR) tablet 200 mg  200 mg Oral BID    gabapentin (NEURONTIN) capsule 600 mg  600 mg Oral QID    [START ON 2/4/2023] DULoxetine (CYMBALTA) capsule 60 mg  60 mg Oral DAILY       Social History:  Social History     Tobacco Use    Smoking status: Former     Packs/day: 1.00     Types: Cigarettes    Smokeless tobacco: Never   Substance Use Topics    Alcohol use: No     Comment: pt 30 days clean       Family History:  Family History   Problem Relation Age of Onset    Cancer Other     Hypertension Other     Cancer Mother     Cancer Maternal Aunt     Cancer Maternal Grandmother        Review of Systems:    Constitutional: negative fever, negative chills, negative weight loss  Eyes:   negative visual changes  ENT:   negative sore throat, tongue or lip swelling  Respiratory:  negative cough, negative dyspnea  Cards:  negative for chest pain, palpitations, lower extremity edema  GI:   See HPI  :  negative for frequency, dysuria  Integument:  negative for rash and pruritus  Heme:  negative for easy bruising and gum/nose bleeding  Musculoskel: negative for myalgias,  back pain and muscle weakness  Neuro: negative for headaches, dizziness, vertigo  Psych:  negative for feelings of anxiety, depression      Objective:   Patient Vitals for the past 8 hrs:   BP Temp Pulse Resp SpO2 Height   02/03/23 0824 127/78 97.8 °F (36.6 °C) 67 17 98 % --   02/03/23 0652 -- -- -- -- -- 5' 2\" (1.575 m)     No intake/output data recorded. 02/01 1901 - 02/03 0700  In: 1000 [I.V.:1000]  Out: -       PHYSICAL EXAM:  General appearance: cooperative, no distress, appears stated age  Skin: Extremities and face reveal no rashes. HEENT: Sclerae anicteric. Cardiovascular: Regular rate and rhythm. Respiratory: Comfortable breathing with no accessory muscle use. GI: Abdomen nondistended, soft, and nontender. Normal active bowel sounds. Rectal: Deferred   Musculoskeletal: No pitting edema of the lower legs.     Neurological:Patient is alert and oriented. Psychiatric: No anxiety or agitation. Data Review     Recent Labs     02/03/23  0448 02/02/23  1529   WBC 7.8 7.6   HGB 11.0* 12.1   HCT 32.3* 35.1   * 389     Recent Labs     02/03/23  0448 02/02/23  1529    136   K 2.9* 4.9   * 103   CO2 27 25   BUN 6 8   CREA 0.70 0.84   GLU 77 300*   CA 8.5 8.9     Recent Labs     02/03/23  0448 02/02/23  1529   AP 65 86   TP 6.0* 7.2   ALB 3.1* 3.7   GLOB 2.9 3.5   LPSE 27*  --      No results for input(s): INR, PTP, APTT, INREXT in the last 72 hours. Imaging studies reviewed    Assessment / Plan :     Ms. Cody Prieto  presented to [place] ER with complaints of abdominal pain, nausea and vomiting. Endorses hx of recurrent episodes of nausea requiring admission. Has also had some associated loose stools. Denies melena or hematochezia. Significant history for gastroparesis, pancreatectomy and diabetes. Hgb A1C 8.1     CT AP 2/2/23  IMPRESSION   1. No acute abdominal or pelvic pathology. 2. Extensive postsurgical changes, not significantly changed.     - Supportive care including anti-emetics, pain control and MIVF per hospitalist  - Tight BG control   - Advance diet as tolerated with goal of low fiber diet          Patient Active Hospital Problem List:   Active Problems:    Hyperglycemia (2/2/2023)      Intractable nausea and vomiting (2/2/2023)      DM [facility]di[facility], [facility] (2/2/2023)     Loren Thackero, NP

## 2023-02-03 NOTE — H&P
History & Physical    Primary Care Provider: Sebastián Miller MD  Source of Information: Patient and chart review    History of Presenting Illness:   Shad Gonzalez is a 49 y.o. female with hx of mdd w/ gudelia, hypothyroidism, dyslipidemia, pancreatic insufficiency, iddm ii s/p isclet cell transplant, who presented to ed with complaints of abdominal pain, nausea and vomiting. Endorses hx of recurrent episodes episodes of nausea requiring admission. Has also had some associated loose stools. Denies any associated melena or hematochezia. The patient denies any fever, chills, chest pain, ongoing nausea, vomiting, cough, congestion, recent illness, palpitations, or dysuria. Remarkable vitals on ER Presentation: vss  Labs Remarkable for: glu 300,   ER Images: ct abd et pelvis: no acute process  ER rx: morphine, zofran, 1L NS Bolus     Review of Systems:  Pertinent items are noted in the History of Present Illness. Past Medical History:   Diagnosis Date    Anxiety     Depression     Diabetes ([facility] [address]     Hypercholesterolemia     Hypothyroid     Ill-defined condition     pancreatitis 2015    Liver disease     \"liver failure\" 2015    Long term (current) use of anticoagulants     SBO (small bowel obstruction) (HCC)     Thromboembolus (HCC)       Past Surgical History:   Procedure Laterality Date    HX CHOLECYSTECTOMY      HX HYSTERECTOMY      HX SPLENECTOMY      VA UNLISTED PROCEDURE ABDOMEN PERITONEUM & OMENTUM      Pt stated \"I have no pancreas\"      Prior to Admission medications    Medication Sig Start Date End Date Taking? Authorizing Provider   prochlorperazine (Compazine) 10 mg tablet Take 1 Tablet by mouth every eight (8) hours as needed for Nausea. 9/15/21   ELISABETH Levine   dicyclomine (BENTYL) 20 mg tablet Take 1 Tablet by mouth every six (6) hours as needed for Abdominal Cramps.  9/15/21   ELISABETH Levine   ondansetron (ZOFRAN ODT) 4 mg disintegrating tablet Take 1 Tab by mouth every eight (8) hours as needed for Nausea. 7/16/19   Demond Ring PA   ondansetron (ZOFRAN ODT) 4 mg disintegrating tablet Take 1 Tab by mouth every eight (8) hours as needed for Nausea. 7/26/17   Yakov Hernandez DO   buPROPion SR [facility] SR) 150 mg SR tablet Take 150 mg by mouth daily. OtherKenzie MD   traZODone (DESYREL) 100 mg tablet Take 100 mg by mouth nightly. 1/2 to 1 every night    Kenzie Mora MD   atorvastatin (LIPITOR) 40 mg tablet Take 40 mg by mouth nightly. Kenzie Mora MD   levothyroxine (SYNTHROID) 112 mcg tablet Take 112 mcg by mouth Daily (before breakfast). Kenzie Mora MD   ergocalciferol (VITAMIN D2) 50,000 unit capsule Take 50,000 Units by mouth every seven (7) days. Patient not taking: Reported on 7/10/2021    Kenzie Mora MD   insulin lispro (HUMALOG) 100 unit/mL injection 4 Units by SubCUTAneous route Before breakfast, lunch, and dinner. Kenzie Mora MD   gabapentin (NEURONTIN) 300 mg capsule Take 300 mg by mouth three (3) times daily. Kenzie Mora MD   sertraline (ZOLOFT) 50 mg tablet Take 50 mg by mouth daily. Provider, Siva   folic acid (FOLVITE) 1 mg tablet Take 1 Tab by mouth daily. 3/26/15   Farhana Johnson MD   amylase-lipase-protease (CREON) 24,000-76,000 -120,000 unit capsule Take 1 Cap by mouth three (3) times daily (with meals).  3/26/15   Farhana Johnson MD     No Known Allergies   Family History   Problem Relation Age of Onset    Cancer Other     Hypertension Other     Cancer Mother     Cancer Maternal Aunt     Cancer Maternal Grandmother         SOCIAL HISTORY:  Patient resides:  Independently x   Assisted Living    SNF    With family care       Smoking history:   None x   Former    Chronic      Alcohol history:   None x   Social    Chronic      Ambulates:   Independently x   w/cane    w/walker    w/wc    CODE STATUS:  DNR    Full x   Other      Objective:     Physical Exam:     Visit Vitals  BP (!) 147/79 (BP 1 Location: Left upper arm, BP Patient Position: At rest)   Pulse 84   Temp 98.3 °F (36.8 °C)   Resp 16   Wt 65.2 kg (143 lb 11.8 oz)   LMP 02/16/2015   SpO2 96%   BMI 26.29 kg/m²      O2 Device: None (Room air)    General:  Alert, cooperative, no distress, appears stated age. Head:  Normocephalic, without obvious abnormality, atraumatic. Eyes:  Conjunctivae/corneas clear. PERRL, EOMs intact. Nose: Nares normal. Septum midline. Mucosa normal.        Neck: Supple, symmetrical, trachea midline,. Lungs:   Clear to auscultation bilaterally. Chest wall:  No tenderness or deformity. Heart:  Regular rate and rhythm, S1, S2 normal   Abdomen:   Soft, mildly tender to deep palpation. Bowel sounds normal. No masses,  No organomegaly. Extremities: Extremities normal, atraumatic, no cyanosis or edema. Pulses: 2+ and symmetric all extremities. Skin: Skin color, texture, turgor normal. No rashes or lesions   Neurologic: CNII-XII intact. Data Review:     Recent Days:  Recent Labs     02/02/23  1529   WBC 7.6   HGB 12.1   HCT 35.1        Recent Labs     02/02/23  1529      K 4.9      CO2 25   *   BUN 8   CREA 0.84   CA 8.9   ALB 3.7   ALT 71     No results for input(s): PH, PCO2, PO2, HCO3, FIO2 in the last 72 hours. 24 Hour Results:  Recent Results (from the past 24 hour(s))   CBC WITH AUTOMATED DIFF    Collection Time: 02/02/23  3:29 PM   Result Value Ref Range    WBC 7.6 3.6 - 11.0 K/uL    RBC 3.98 3.80 - 5.20 M/uL    HGB 12.1 11.5 - 16.0 g/dL    HCT 35.1 35.0 - 47.0 %    MCV 88.2 80.0 - 99.0 FL    MCH 30.4 26.0 - 34.0 PG    MCHC 34.5 30.0 - 36.5 g/dL    RDW 13.6 11.5 - 14.5 %    PLATELET 328 605 - 209 K/uL    MPV 10.8 8.9 - 12.9 FL    NRBC 0.0 0  WBC    ABSOLUTE NRBC 0.00 0.00 - 0.01 K/uL    NEUTROPHILS 59 32 - 75 %    LYMPHOCYTES 30 12 - 49 %    MONOCYTES 7 5 - 13 %    EOSINOPHILS 3 0 - 7 %    BASOPHILS 1 0 - 1 %    IMMATURE GRANULOCYTES 0 0.0 - 0.5 %    ABS. NEUTROPHILS 4.5 1.8 - 8.0 K/UL    ABS. LYMPHOCYTES 2.3 0.8 - 3.5 K/UL    ABS. MONOCYTES 0.5 0.0 - 1.0 K/UL    ABS. EOSINOPHILS 0.2 0.0 - 0.4 K/UL    ABS. BASOPHILS 0.1 0.0 - 0.1 K/UL    ABS. IMM. GRANS. 0.0 0.00 - 0.04 K/UL    DF AUTOMATED     METABOLIC PANEL, COMPREHENSIVE    Collection Time: 02/02/23  3:29 PM   Result Value Ref Range    Sodium 136 136 - 145 mmol/L    Potassium 4.9 3.5 - 5.1 mmol/L    Chloride 103 97 - 108 mmol/L    CO2 25 21 - 32 mmol/L    Anion gap 8 5 - 15 mmol/L    Glucose 300 (H) 65 - 100 mg/dL    BUN 8 6 - 20 MG/DL    Creatinine 0.84 0.55 - 1.02 MG/DL    BUN/Creatinine ratio 10 (L) 12 - 20      eGFR >60 >60 ml/min/1.73m2    Calcium 8.9 8.5 - 10.1 MG/DL    Bilirubin, total 0.5 0.2 - 1.0 MG/DL    ALT (SGPT) 71 12 - 78 U/L    AST (SGOT) 62 (H) 15 - 37 U/L    Alk. phosphatase 86 45 - 117 U/L    Protein, total 7.2 6.4 - 8.2 g/dL    Albumin 3.7 3.5 - 5.0 g/dL    Globulin 3.5 2.0 - 4.0 g/dL    A-G Ratio 1.1 1.1 - 2.2     TROPONIN-HIGH SENSITIVITY    Collection Time: 02/02/23  3:29 PM   Result Value Ref Range    Troponin-High Sensitivity 5 0 - 51 ng/L   URINALYSIS W/ REFLEX CULTURE    Collection Time: 02/02/23  3:30 PM    Specimen: Urine   Result Value Ref Range    Color YELLOW/STRAW      Appearance CLEAR CLEAR      Specific gravity 1.010 1.003 - 1.030      pH (UA) 5.5 5.0 - 8.0      Protein Negative NEG mg/dL    Glucose >1,000 (A) NEG mg/dL    Ketone Negative NEG mg/dL    Bilirubin Negative NEG      Blood Negative NEG      Urobilinogen 0.2 0.2 - 1.0 EU/dL    Nitrites Negative NEG      Leukocyte Esterase TRACE (A) NEG      WBC 0-4 0 - 4 /hpf    RBC 0-5 0 - 5 /hpf    Epithelial cells FEW FEW /lpf    Bacteria Negative NEG /hpf    UA:UC IF INDICATED CULTURE NOT INDICATED BY UA RESULT     GLUCOSE, POC    Collection Time: 02/02/23  7:29 PM   Result Value Ref Range    Glucose (POC) 95 65 - 117 mg/dL    Performed by Hodan Eden RN          Imaging:     Assessment:     Chapito Ernst is a 49 y.o. female with hx of mdd w/ gudelia, hypothyroidism, dyslipidemia, pancreatic insufficiency, iddm ii s/p isclet cell transplant, who is admitted for intractable nausea and vomiting.        Plan:       Intractable Nausea and Vomiting  -recurrent with unclear etiology  -ct abd et pelvis shows no acute process  -improving with mivf, pain management and antiemetics  -advance diet as tolerated    NIDDM II w/ Neuropathy  -SSI +Hypoglycemic protocols  -Advance diet as tolerated  -DM consult - insulin pump  -home gabapentin    Pancreatic Insufficiency  -c/w home pancrelipase    MDD/GUDELIA  -c/w home Zoloft and Wellbutrin    Dyslipidemia  -home Lipitor        FEN/GI -  [email or web address]  Activity - as tolerated  DVT prophylaxis - lovenox  GI prophylaxis -  NI  Disposition - Home    CODE STATUS:   full code       Signed By: Maia Martinez MD     February 3, 2023

## 2023-02-03 NOTE — PROGRESS NOTES
Transition of Care Plan    RUR: 5%  DISPOSITION: home with family   - diabetes consult management   F/U with PCPS/Specialist  Transport: Sister or      Reason for Admission:  abdominal pain, nausea, and vomiting                   RUR Score:    5%                 Plan for utilizing home health:  no current plan         PCP: First and Last name:  Benjamín Uriostegui MD     Name of Practice:    Are you a current patient: Yes/No: yes   Approximate date of last visit: last year annual    Can you participate in a virtual visit with your PCP:                     Current Advanced Directive/Advance Care Plan: Full Code    Healthcare Decision Maker:   Spouse Anam Montiel ([phone])                  Transition of Care Plan:                      CM met with patient at bedside to introduce self and role. 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 Care Management Interventions  PCP Verified by CM: Yes  Palliative Care Criteria Met (RRAT>21 & CHF Dx)?: No  Mode of Transport at Discharge:  Other (see comment) ( or sister)  Transition of Care Consult (CM Consult): Discharge Planning  MyChart Signup: Yes  Discharge Durable Medical Equipment: No  Health Maintenance Reviewed: Yes  Physical Therapy Consult: No  Occupational Therapy Consult: No  Speech Therapy Consult: No  Support Systems: Spouse/Significant Other, Other Family Member(s)  Confirm Follow Up Transport: Family  The Plan for Transition of Care is Related to the Following Treatment Goals : home  [address] Provided?: No  Discharge Location  Patient Expects to be Discharged to[de-identified] Home    Mark Dianne   MSW Student

## 2023-02-03 NOTE — ED NOTES
Verbal shift change report given to Nestor Mace RN (oncoming nurse) by Mitchel Montes De Oca RN (offgoing nurse). Report included the following information SBAR, ED Summary, MAR, and Recent Results.

## 2023-02-03 NOTE — PROGRESS NOTES
[facility]  Hospitalist Group                                                                                          Hospitalist Progress Note  Tono Amor MD  Answering service: [phone] from in house phone        Date of Service:  2/3/2023  NAME:  Abby Garland  :  1975  MRN:  [id number]       Admission Summary:     Abby Garland is a 49 y.o. 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 Endorses hx of recurrent episodes episodes of nausea requiring admission. Has also had some associated loose stools. Denies any associated melena or hematochezia. The patient denies any fever, chills, chest pain, ongoing nausea, vomiting, cough, congestion, recent illness, palpitations, or dysuria. Remarkable vitals on ER Presentation: vss  Labs Remarkable for: glu 300,   ER Images: ct abd et pelvis: no acute process  ER rx: morphine, zofran, 1L NS Bolus     Interval history / Subjective:     Patient is seen and examined. Has abdominal pain better than yesterday, had some nausea but no vomiting.   Last bowel movement was 2/2     Assessment & Plan:     Intractable nausea and vomiting possible due to gastroparesis and pancreatic insufficiency  -CT abdomen and pelvis no acute abdominal or pelvic pathology, extensive postsurgical change, no significant change  -Afebrile, no leukocytosis  -Continue normal saline, Pepcid, pancreatic enzyme, prn bentyl, zofran, moprhine   -GI is consulted    T2DM insulin dependent, s/p Islet cell transplant  -A1c 8.1  -On insulin pump, sliding scale and monitor fingerstick glucose    Chronic pancreatic insufficiency  -Patient with intractable nausea and vomiting  -Continue pancreatic enzyme, IV fluid, as needed Zofran    Hx of MDD/AYDIN  -Stable, continue home Cymbalta, Zoloft and trazodone    Hx of HLD  -Continue home Lipitor       Code status: Full code  Prophylaxis: Lovenox  Care Plan discussed with: Patient, nurse and CM  Anticipated Disposition: Home with family, 24-48 hours  Inpatient  Cardiac monitoring: None     Hospital Problems  Date Reviewed: 5/4/2015            Codes Class Noted POA    Hyperglycemia ICD-10-CM: R73.9  ICD-9-CM: 790.29  2/2/2023 Unknown        Intractable nausea and vomiting ICD-10-CM: R11.2  ICD-9-CM: 536.2  2/2/2023 Unknown        DM I (diabetes mellitus, type I) ([facility] [address] ICD-10-CM: E10.9  ICD-9-CM: 250.01  2/2/2023 Unknown           Social Determinants of Health     Tobacco Use: Medium Risk    Smoking Tobacco Use: Former    Smokeless Tobacco Use: Never    Passive Exposure: Not on file   Alcohol Use: Not on file   Financial Resource Strain: Not on file   Food Insecurity: Not on file   Transportation Needs: Not on file   Physical Activity: Not on file   Stress: Not on file   Social Connections: Not on file   Intimate Partner Violence: Not on file   Depression: Not on file   Housing Stability: Not on file       Vital Signs:    Last 24hrs VS reviewed since prior progress note. Most recent are:  Visit Vitals  /78 (BP 1 Location: Left upper arm, BP Patient Position: At rest)   Pulse 67   Temp 97.8 °F (36.6 °C)   Resp 17   Ht 5' 2\" (1.575 m)   Wt 65.2 kg (143 lb 11.8 oz)   SpO2 98%   BMI 26.29 kg/m²         Intake/Output Summary (Last 24 hours) at 2/3/2023 0195  Last data filed at 2/2/2023 1708  Gross per 24 hour   Intake 1000 ml   Output --   Net 1000 ml        Physical Examination:     I had a face to face encounter with this patient and independently examined them on 2/3/2023 as outlined below:          Constitutional:  No acute distress, cooperative, pleasant    ENT:  Oral mucosa moist, oropharynx benign. Resp:  CTA bilaterally. No wheezing/rhonchi/rales. No accessory muscle use.     CV:  Regular rhythm, normal rate, no murmurs, gallops, rubs    GI:  Soft, non distended, non tender. normoactive bowel sounds, no hepatosplenomegaly     Musculoskeletal:  No edema, warm, 2+ pulses throughout    Neurologic:  Moves all extremities. AAOx3, CN II-XII reviewed            Data Review:    Review and/or order of clinical lab test  Review and/or order of tests in the radiology section of CPT  Review and/or order of tests in the medicine section of CPT    I have independently reviewed and interpreted patient's lab and all other diagnostic data    Labs:     Recent Labs     02/03/23  0448 02/02/23  1529   WBC 7.8 7.6   HGB 11.0* 12.1   HCT 32.3* 35.1   * 389     Recent Labs     02/03/23 0448 02/02/23  1529    136   K 2.9* 4.9   * 103   CO2 27 25   BUN 6 8   CREA 0.70 0.84   GLU 77 300*   CA 8.5 8.9   MG 2.0  --      Recent Labs     02/03/23 0448 02/02/23  1529   ALT 51 71   AP 65 86   TBILI 0.4 0.5   TP 6.0* 7.2   ALB 3.1* 3.7   GLOB 2.9 3.5   LPSE 27*  --      No results for input(s): INR, PTP, APTT, INREXT in the last 72 hours. No results for input(s): FE, TIBC, PSAT, FERR in the last 72 hours. Lab Results   Component Value Date/Time    Folate 1.6 (L) 03/09/2015 04:15 AM      No results for input(s): PH, PCO2, PO2 in the last 72 hours. No results for input(s): CPK, CKNDX, TROIQ in the last 72 hours.     No lab exists for component: CPKMB  Lab Results   Component Value Date/Time    Cholesterol, total 169 05/05/2015 04:12 AM    HDL Cholesterol 31 05/05/2015 04:12 AM    LDL, calculated 110.4 (H) 05/05/2015 04:12 AM    Triglyceride 138 05/05/2015 04:12 AM    CHOL/HDL Ratio 5.5 (H) 05/05/2015 04:12 AM     Lab Results   Component Value Date/Time    Glucose (POC) 92 02/03/2023 05:52 AM    Glucose (POC) 95 02/02/2023 07:29 PM    Glucose (POC) 319 (H) 09/14/2021 10:43 PM    Glucose (POC) 80 07/26/2017 03:09 PM    Glucose (POC) 70 07/26/2017 02:52 PM     Lab Results   Component Value Date/Time    Color YELLOW/STRAW 02/02/2023 03:30 PM    Appearance CLEAR 02/02/2023 03:30 PM    Specific gravity 1.010 02/02/2023 03:30 PM    Specific gravity 1.020 07/10/2021 02:19 AM    pH (UA) 5.5 02/02/2023 03:30 PM    Protein Negative 02/02/2023 03:30 PM    Glucose >1,000 (A) 02/02/2023 03:30 PM    Ketone Negative 02/02/2023 03:30 PM    Bilirubin Negative 02/02/2023 03:30 PM    Urobilinogen 0.2 02/02/2023 03:30 PM    Nitrites Negative 02/02/2023 03:30 PM    Leukocyte Esterase TRACE (A) 02/02/2023 03:30 PM    Epithelial cells FEW 02/02/2023 03:30 PM    Bacteria Negative 02/02/2023 03:30 PM    WBC 0-4 02/02/2023 03:30 PM    RBC 0-5 02/02/2023 03:30 PM       Notes reviewed from all clinical/nonclinical/nursing services involved in patient's clinical care. Care coordination discussions were held with appropriate clinical/nonclinical/ nursing providers based on care coordination needs. Patients current active Medications were reviewed, considered, added and adjusted based on the clinical condition today. Home Medications were reconciled to the best of my ability given all available resources at the time of admission. Route is PO if not otherwise noted.       Medications Reviewed:     Current Facility-Administered Medications   Medication Dose Route Frequency    lipase-protease-amylase (ZENPEP 20,000) capsule 1 Capsule  1 Capsule Oral TID WITH MEALS    atorvastatin (LIPITOR) tablet 40 mg  40 mg Oral QHS    buPROPion SR (WELLBUTRIN SR) tablet 150 mg  150 mg Oral DAILY    dicyclomine (BENTYL) tablet 20 mg  20 mg Oral Q7S PRN    folic acid (FOLVITE) tablet 1 mg  1 mg Oral DAILY    gabapentin (NEURONTIN) capsule 300 mg  300 mg Oral TID    levothyroxine (SYNTHROID) tablet 112 mcg  112 mcg Oral ACB    sertraline (ZOLOFT) tablet 50 mg  50 mg Oral DAILY    traZODone (DESYREL) tablet 100 mg  100 mg Oral QHS    morphine injection 1-2 mg  1-2 mg IntraVENous Q3H PRN    sodium chloride (NS) flush 5-40 mL  5-40 mL IntraVENous Q8H    sodium chloride (NS) flush 5-40 mL  5-40 mL IntraVENous PRN acetaminophen (TYLENOL) tablet 650 mg  650 mg Oral Q6H PRN    Or    acetaminophen (TYLENOL) suppository 650 mg  650 mg Rectal Q6H PRN    polyethylene glycol (MIRALAX) packet 17 g  17 g Oral DAILY PRN    ondansetron (ZOFRAN ODT) tablet 4 mg  4 mg Oral Q8H PRN    Or    ondansetron (ZOFRAN) injection 4 mg  4 mg IntraVENous Q6H PRN    enoxaparin (LOVENOX) injection 40 mg  40 mg SubCUTAneous DAILY    0.9% sodium chloride infusion  125 mL/hr IntraVENous CONTINUOUS    glucose chewable tablet 16 g  4 Tablet Oral PRN    glucagon (GLUCAGEN) injection 1 mg  1 mg IntraMUSCular PRN    dextrose 10 % infusion 0-250 mL  0-250 mL IntraVENous PRN    insulin lispro (HUMALOG) injection   SubCUTAneous Q6H    potassium chloride 10 mEq in 100 ml IVPB  10 mEq IntraVENous Q1H    insulin pump (PATIENT SUPPLIED)   SubCUTAneous PRN    dextrose 10 % infusion 0-250 mL  0-250 mL IntraVENous PRN     ______________________________________________________________________  EXPECTED LENGTH OF STAY: - - -  ACTUAL LENGTH OF STAY:          1                 Diego Alegria MD

## 2023-02-03 NOTE — ED NOTES
TRANSFER - OUT REPORT:    Verbal report given to Geary Sicard RN(name) on Yamini Byrd  being transferred to [facility](unit) for routine progression of care       Report consisted of patients Situation, Background, Assessment and   Recommendations(SBAR). Information from the following report(s) SBAR was reviewed with the receiving nurse. Lines:   Peripheral IV 02/02/23 Anterior;Proximal;Right Forearm (Active)        Opportunity for questions and clarification was provided.       Patient transported with:  [address]

## 2023-02-03 NOTE — DIABETES MGMT
[address] [facility]  DIABETES MANAGEMENT CONSULT    Consulted by Provider for advanced nursing evaluation and care for inpatient blood glucose management. Evaluation and Action Plan   Debbie Wilson is a 47yo female admitted with intractable N/V, abdominal pain associated with chronic gastroparesis/ and exocrine pancreatic insufficiency. 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 Her diabetes is managed by an insulin pump and Dexcom G6. Current A1C is pending, however, A1C from Sept. 2022, 8.1%. She endorses difficulty managing her diabetes with her gastroparesis and pancreatic insufficiency. Voices the timing of food with insulin and creon is mismatched based on how her body chooses to react to the food/creon/ and insulin. She does voice some scary lows and tries to ensure it does not happen , so her numbers are a little higher sometimes. While inpatient, she may continue to use her insulin pump and CGM. She is aware that we will still need to do POC glucose checks ACHS and use those values to make pump adjustments. Management Rationale Action Plan   Medication  May use insulin pump while hospitalized  Pump waiver signed and placed on paper chart       Diabetes Discharge Plan   Medication  1. Referral  []        Outpatient diabetes education   Additional orders            Initial Presentation   Debbie Wilson is a 49 y.o. female 2/2/23 admitted  after experiencing N/V, abdominal pain. Endorses hx of recurrent episodes episodes of nausea requiring admission. Has also had some associated loose stools.   LAB:   CXR:  CT abd/pelvis: no acute process  MRI:    HX:   Past Medical History:   Diagnosis Date    Anxiety     Depression     Diabetes ([facility] [address]     Hypercholesterolemia     Hypothyroid     Ill-defined condition     pancreatitis 2015    Liver disease     \"liver failure\" 2015    Long term (current) use of anticoagulants     SBO (small bowel obstruction) (HCC)     Thromboembolus (HCC)         INITIAL DX:   Intractable nausea and vomiting [R11.2]  Hyperglycemia [R73.9]  DM I (diabetes mellitus, type I) ([facility] [E10.9]     Current Treatment     TX: pain mgmt/ anti nausea medication/ IVF    Hospital Course   Clinical progress has been complicated by intractable N/V so far with unclear etiology. Diabetes History   T2D--> Type 3 C diabetes, s/p total pancreatectomy 6yrs ago with islet cell transplant for chronic pancreatitis/ exocrine pancreatic insufficiency. Diabetes managed with insulin pump. She is followed by Adriano Flaherty MD with [facility],     Diabetes-related Medical History  Acute complications  hyperglycemia  Neurological complications  Gastroparesis and Peripheral neuropathy  Microvascular disease  none  Macrovascular disease  none  Other associated conditions     Depression/ hypothyroid    Diabetes Medication History: Uses Tandem insulin pump with Dexcom G6  Key Antihyperglycemic Medications               insulin lispro (HUMALOG) 100 unit/mL injection 4 Units by SubCUTAneous route Before breakfast, lunch, and dinner. Pump Settings: (Site changed 2/2/23 and pump reservoir re-filled)  Basal: 0.5u/hr  Carb Ratio: 1:13  Correction Factor: 1:75  Target BG\" 110    Diabetes self-management practices:   Eating pattern-hit and miss/    [x] Eating a carbohydrate-controlled meal plan    Physical activity pattern-   [x] Employing a physical activity program to control BG    Monitoring pattern-Dexcom G6 ;    [x] Testing BGs sufficiently to inform self-management adjustments    Taking medications pattern  [x] Consistent administration  [x] Affordable  Reducing risks  [] Influenza: There is no immunization history for the selected administration types on file for this patient.   [] Pneumonia: There is no immunization history for the selected administration types on file for this patient. [] Hepatitis: There is no immunization history for the selected administration types on file for this patient. Social determinants of health impacting diabetes self-management practices   Struggling with anxiety and/or depression and Concerned that you need to know more about how to stay healthy with diabetes  Overall evaluation:    [x] Striving to achieving A1c target with drug therapy & self-care practices    Subjective   I manage OK, it's a daily struggle.     Folllowed by [facility] s/p pancreatectomy 6yrs ago      Objective   Physical exam  General Normal weight  female in no acute distress. Conversant and cooperative  Neuro  Alert, oriented   Vital Signs Visit Vitals  BP (!) 165/80 (BP 1 Location: Left upper arm, BP Patient Position: At rest)   Pulse 62   Temp 98.4 °F (36.9 °C)   Resp 16   Ht 5' 2\" (1.575 m)   Wt 65.2 kg (143 lb 11.8 oz)   SpO2 98%   BMI 26.29 kg/m²     Skin  Warm and dry. Heart   Regular rate and rhythm.  No murmurs, rubs or gallops  Lungs  Clear to auscultation without rales or rhonchi  Extremities No foot wounds        Laboratory  Recent Labs     02/03/23  0448 02/02/23  1529   GLU 77 300*   AGAP 5 8   WBC 7.8 7.6   CREA 0.70 0.84   AST 24 62*   ALT 51 71       Factors impacting BG management  Factor Dose Comments   Nutrition:  Standard meals     60 grams/meal      Pain prn    Other:   Kidney function  Liver function     WNL  WNL      Blood glucose pattern    Significant diabetes-related events over the past 24-72 hours  Type 3c Diabetes (treated like T1D)   Hyperglycemia at admission now resolved  Tandem insulin pump in place with Dexcom CGM  Clear liquid diet    Assessment and Nursing Intervention   Nursing Diagnosis Risk for unstable blood glucose pattern   Nursing Intervention Domain 4110 Decision-making Support   Nursing Interventions Examined current inpatient diabetes/blood glucose control   Explored factors facilitating and impeding inpatient management  Explored corrective strategies with patient and responsible inpatient provider   Informed patient of rational for insulin strategy while hospitalized     Nursing Diagnosis 09795 Ineffective Health Management   Nursing Intervention Domain 1086 Decision-making Support   Nursing Interventions Identified diabetes self-management practices impeding diabetes control  Discussed diabetes survival skills related to  Healthy Plate eating plan; given handouts  Role of physical activity in improving insulin sensitivity and action  Procedure for blood glucose monitoring & options for low-cost products  Medications plan at discharge     Billing Code(s)     [x] 79450 IP initial hospital care - 40 minutes     Before making these care recommendations, I personally reviewed the hospitalization record, including notes, laboratory & diagnostic data and current medications, and examined the patient at the bedside (circumstances permitting) before determining care. More than fifty (50) percent of the time was spent in patient counseling and/or care coordination.   Total minutes: [address] [facility] DADA Castnaeda  Diabetes Clinical Nurse Specialist  [facility]  Access via [address] [place] [address]

## 2023-02-04 LAB
ALBUMIN SERPL-MCNC: 3.7 G/DL (ref 3.5–5)
ALBUMIN/GLOB SERPL: 1.2 (ref 1.1–2.2)
ALP SERPL-CCNC: 69 U/L (ref 45–117)
ALT SERPL-CCNC: 53 U/L (ref 12–78)
ANION GAP SERPL CALC-SCNC: 5 MMOL/L (ref 5–15)
AST SERPL-CCNC: 34 U/L (ref 15–37)
BILIRUB SERPL-MCNC: 0.3 MG/DL (ref 0.2–1)
BUN SERPL-MCNC: 4 MG/DL (ref 6–20)
BUN/CREAT SERPL: 6 (ref 12–20)
CALCIUM SERPL-MCNC: 8.8 MG/DL (ref 8.5–10.1)
CHLORIDE SERPL-SCNC: 109 MMOL/L (ref 97–108)
CO2 SERPL-SCNC: 24 MMOL/L (ref 21–32)
CREAT SERPL-MCNC: 0.67 MG/DL (ref 0.55–1.02)
ERYTHROCYTE [DISTWIDTH] IN BLOOD BY AUTOMATED COUNT: 13.7 % (ref 11.5–14.5)
GLOBULIN SER CALC-MCNC: 3.2 G/DL (ref 2–4)
GLUCOSE BLD STRIP.AUTO-MCNC: 110 MG/DL (ref 65–117)
GLUCOSE BLD STRIP.AUTO-MCNC: 132 MG/DL (ref 65–117)
GLUCOSE BLD STRIP.AUTO-MCNC: 193 MG/DL (ref 65–117)
GLUCOSE BLD STRIP.AUTO-MCNC: 234 MG/DL (ref 65–117)
GLUCOSE BLD STRIP.AUTO-MCNC: 68 MG/DL (ref 65–117)
GLUCOSE BLD STRIP.AUTO-MCNC: 87 MG/DL (ref 65–117)
GLUCOSE SERPL-MCNC: 116 MG/DL (ref 65–100)
HCT VFR BLD AUTO: 34.4 % (ref 35–47)
HGB BLD-MCNC: 11.2 G/DL (ref 11.5–16)
MCH RBC QN AUTO: 29.7 PG (ref 26–34)
MCHC RBC AUTO-ENTMCNC: 32.6 G/DL (ref 30–36.5)
MCV RBC AUTO: 91.2 FL (ref 80–99)
NRBC # BLD: 0 K/UL (ref 0–0.01)
NRBC BLD-RTO: 0 PER 100 WBC
PLATELET # BLD AUTO: 456 K/UL (ref 150–400)
PMV BLD AUTO: 10.2 FL (ref 8.9–12.9)
POTASSIUM SERPL-SCNC: 4.1 MMOL/L (ref 3.5–5.1)
PROT SERPL-MCNC: 6.9 G/DL (ref 6.4–8.2)
RBC # BLD AUTO: 3.77 M/UL (ref 3.8–5.2)
SERVICE CMNT-IMP: ABNORMAL
SERVICE CMNT-IMP: NORMAL
SODIUM SERPL-SCNC: 138 MMOL/L (ref 136–145)
WBC # BLD AUTO: 14.5 K/UL (ref 3.6–11)

## 2023-02-04 PROCEDURE — 74011000250 HC RX REV CODE- 250: Performed by: STUDENT IN AN ORGANIZED HEALTH CARE EDUCATION/TRAINING PROGRAM

## 2023-02-04 PROCEDURE — 36415 COLL VENOUS BLD VENIPUNCTURE: CPT

## 2023-02-04 PROCEDURE — 65270000032 HC RM SEMIPRIVATE

## 2023-02-04 PROCEDURE — 82962 GLUCOSE BLOOD TEST: CPT

## 2023-02-04 PROCEDURE — 74011250637 HC RX REV CODE- 250/637: Performed by: STUDENT IN AN ORGANIZED HEALTH CARE EDUCATION/TRAINING PROGRAM

## 2023-02-04 PROCEDURE — 74011250636 HC RX REV CODE- 250/636: Performed by: HOSPITALIST

## 2023-02-04 PROCEDURE — 85027 COMPLETE CBC AUTOMATED: CPT

## 2023-02-04 PROCEDURE — 80053 COMPREHEN METABOLIC PANEL: CPT

## 2023-02-04 PROCEDURE — 74011250637 HC RX REV CODE- 250/637: Performed by: HOSPITALIST

## 2023-02-04 PROCEDURE — 74011250636 HC RX REV CODE- 250/636: Performed by: STUDENT IN AN ORGANIZED HEALTH CARE EDUCATION/TRAINING PROGRAM

## 2023-02-04 RX ORDER — HYDROMORPHONE HYDROCHLORIDE 2 MG/ML
3 INJECTION, SOLUTION INTRAMUSCULAR; INTRAVENOUS; SUBCUTANEOUS
Status: DISCONTINUED | OUTPATIENT
Start: 2023-02-04 | End: 2023-02-05

## 2023-02-04 RX ORDER — HYDROXYZINE 25 MG/1
25 TABLET, FILM COATED ORAL
Status: DISCONTINUED | OUTPATIENT
Start: 2023-02-04 | End: 2023-02-06

## 2023-02-04 RX ORDER — BUPROPION HYDROCHLORIDE 150 MG/1
450 TABLET, EXTENDED RELEASE ORAL DAILY
Status: DISCONTINUED | OUTPATIENT
Start: 2023-02-04 | End: 2023-02-08 | Stop reason: HOSPADM

## 2023-02-04 RX ADMIN — HYDROMORPHONE HYDROCHLORIDE 2 MG: 1 INJECTION, SOLUTION INTRAMUSCULAR; INTRAVENOUS; SUBCUTANEOUS at 06:32

## 2023-02-04 RX ADMIN — SERTRALINE HYDROCHLORIDE 50 MG: 50 TABLET ORAL at 10:35

## 2023-02-04 RX ADMIN — ATORVASTATIN CALCIUM 40 MG: 40 TABLET, FILM COATED ORAL at 22:43

## 2023-02-04 RX ADMIN — BUPROPION HYDROCHLORIDE 450 MG: 150 TABLET, EXTENDED RELEASE ORAL at 15:24

## 2023-02-04 RX ADMIN — LEVOTHYROXINE SODIUM 112 MCG: 0.11 TABLET ORAL at 06:32

## 2023-02-04 RX ADMIN — PANCRELIPASE LIPASE, PANCRELIPASE PROTEASE, PANCRELIPASE AMYLASE 1 CAPSULE: 20000; 63000; 84000 CAPSULE, DELAYED RELEASE ORAL at 17:49

## 2023-02-04 RX ADMIN — SODIUM CHLORIDE 125 ML/HR: 9 INJECTION, SOLUTION INTRAVENOUS at 06:07

## 2023-02-04 RX ADMIN — SODIUM CHLORIDE, PRESERVATIVE FREE 10 ML: 5 INJECTION INTRAVENOUS at 15:39

## 2023-02-04 RX ADMIN — TRAZODONE HYDROCHLORIDE 100 MG: 100 TABLET ORAL at 22:43

## 2023-02-04 RX ADMIN — SODIUM CHLORIDE 125 ML/HR: 9 INJECTION, SOLUTION INTRAVENOUS at 22:43

## 2023-02-04 RX ADMIN — HYDROMORPHONE HYDROCHLORIDE 2 MG: 1 INJECTION, SOLUTION INTRAMUSCULAR; INTRAVENOUS; SUBCUTANEOUS at 10:41

## 2023-02-04 RX ADMIN — ONDANSETRON HYDROCHLORIDE 4 MG: 2 SOLUTION INTRAMUSCULAR; INTRAVENOUS at 12:32

## 2023-02-04 RX ADMIN — ENOXAPARIN SODIUM 40 MG: 100 INJECTION SUBCUTANEOUS at 10:39

## 2023-02-04 RX ADMIN — ACETAMINOPHEN 650 MG: 325 TABLET ORAL at 15:37

## 2023-02-04 RX ADMIN — SODIUM CHLORIDE, PRESERVATIVE FREE 10 ML: 5 INJECTION INTRAVENOUS at 06:32

## 2023-02-04 RX ADMIN — GABAPENTIN 600 MG: 300 CAPSULE ORAL at 17:48

## 2023-02-04 RX ADMIN — GABAPENTIN 600 MG: 300 CAPSULE ORAL at 10:35

## 2023-02-04 RX ADMIN — HYDROMORPHONE HYDROCHLORIDE 2 MG: 1 INJECTION, SOLUTION INTRAMUSCULAR; INTRAVENOUS; SUBCUTANEOUS at 02:21

## 2023-02-04 RX ADMIN — HYDROMORPHONE HYDROCHLORIDE 3 MG: 2 INJECTION, SOLUTION INTRAMUSCULAR; INTRAVENOUS; SUBCUTANEOUS at 20:27

## 2023-02-04 RX ADMIN — PANCRELIPASE LIPASE, PANCRELIPASE PROTEASE, PANCRELIPASE AMYLASE 1 CAPSULE: 20000; 63000; 84000 CAPSULE, DELAYED RELEASE ORAL at 12:31

## 2023-02-04 RX ADMIN — GABAPENTIN 600 MG: 300 CAPSULE ORAL at 12:31

## 2023-02-04 RX ADMIN — FOLIC ACID 1 MG: 1 TABLET ORAL at 10:35

## 2023-02-04 RX ADMIN — DULOXETINE HYDROCHLORIDE 60 MG: 30 CAPSULE, DELAYED RELEASE ORAL at 10:34

## 2023-02-04 RX ADMIN — ONDANSETRON HYDROCHLORIDE 4 MG: 2 SOLUTION INTRAMUSCULAR; INTRAVENOUS at 20:27

## 2023-02-04 RX ADMIN — GABAPENTIN 600 MG: 300 CAPSULE ORAL at 22:43

## 2023-02-04 RX ADMIN — PANCRELIPASE LIPASE, PANCRELIPASE PROTEASE, PANCRELIPASE AMYLASE 1 CAPSULE: 20000; 63000; 84000 CAPSULE, DELAYED RELEASE ORAL at 10:49

## 2023-02-04 RX ADMIN — SODIUM CHLORIDE, PRESERVATIVE FREE 10 ML: 5 INJECTION INTRAVENOUS at 22:43

## 2023-02-04 RX ADMIN — ONDANSETRON HYDROCHLORIDE 4 MG: 2 SOLUTION INTRAMUSCULAR; INTRAVENOUS at 06:07

## 2023-02-04 RX ADMIN — HYDROMORPHONE HYDROCHLORIDE 3 MG: 2 INJECTION, SOLUTION INTRAMUSCULAR; INTRAVENOUS; SUBCUTANEOUS at 15:38

## 2023-02-04 NOTE — PROGRESS NOTES
[facility] Adult  Hospitalist Group                                                                                          Hospitalist Progress Note  Maty Cote MD  Answering service: [phone] from in house phone        Date of Service:  2023  NAME:  Sabrina Gastelum  :  1975  MRN:  [id number]       Admission Summary:     Sabrina Gastelum is a 49 y.o. 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 Endorses hx of recurrent episodes episodes of nausea requiring admission. Has also had some associated loose stools. Denies any associated melena or hematochezia. The patient denies any fever, chills, chest pain, ongoing nausea, vomiting, cough, congestion, recent illness, palpitations, or dysuria. Remarkable vitals on ER Presentation: vss  Labs Remarkable for: glu 300,   ER Images: ct abd et pelvis: no acute process  ER rx: morphine, zofran, 1L NS Bolus     Interval history / Subjective:     Patient is seen and examined. She said her abdominal pain worse today, has nausea but no vomiting. Last bowel movement was 2/2, she said her pain medication is not helping her.       Assessment & Plan:     Intractable nausea and vomiting possible due to gastroparesis and pancreatic insufficiency  -on clear liquid diet   -CT abdomen and pelvis no acute abdominal or pelvic pathology, extensive postsurgical change, no significant change  -Afebrile, no leukocytosis  -Continue normal saline, Pepcid, pancreatic enzyme, prn bentyl, zofran, increased diaudid to 3 mg q 4 hrs  -GI is consulted    T2DM insulin dependent, s/p Islet cell transplant  -A1c 8.1  -On insulin pump, sliding scale and monitor fingerstick glucose    Chronic pancreatic insufficiency  -Patient with intractable nausea and vomiting  -Continue pancreatic enzyme, IV fluid, as needed Zofran    Hx of MDD/AYDIN  -Stable, continue home Cymbalta, Zoloft and trazodone    Hx of HLD  -Continue home Lipitor       Code status: Full code  Prophylaxis: Lovenox  Care Plan discussed with: Patient, nurse and CM  Anticipated Disposition: Home with family, 24-48 hours  Inpatient  Cardiac monitoring: None     Hospital Problems  Date Reviewed: 5/4/2015            Codes Class Noted POA    Hyperglycemia ICD-10-CM: R73.9  ICD-9-CM: 790.29  2/2/2023 Unknown        Intractable nausea and vomiting ICD-10-CM: R11.2  ICD-9-CM: 536.2  2/2/2023 Unknown        DM I (di[facility] mellitus[facility] type [facility]) [facility] ICD-10-CM: E10.9  ICD-9-CM: 250.01  2/2/2023 Unknown         Social Determinants of Health     Tobacco Use: Medium Risk    Smoking Tobacco Use: Former    Smokeless Tobacco Use: Never    Passive Exposure: Not on file   Alcohol Use: Not on file   Financial Resource Strain: Not on file   Food Insecurity: Not on file   Transportation Needs: Not on file   Physical Activity: Not on file   Stress: Not on file   Social Connections: Not on file   Intimate Partner Violence: Not on file   Depression: Not on file   Housing Stability: Not on file       Vital Signs:    Last 24hrs VS reviewed since prior progress note. Most recent are:  Visit Vitals  BP (!) 154/99 (BP 1 Location: Right upper arm, BP Patient Position: At rest)   Pulse 85   Temp 99 °F (37.2 °C)   Resp 28   Ht 5' 2\" (1.575 m)   Wt 65.2 kg (143 lb 11.8 oz)   SpO2 90%   BMI 26.29 kg/m²       No intake or output data in the 24 hours ending 02/04/23 2021       Physical Examination:     I had a face to face encounter with this patient and independently examined them on 2/4/2023 as outlined below:          Constitutional:  No acute distress, cooperative, pleasant    ENT:  Oral mucosa moist, oropharynx benign. Resp:  CTA bilaterally. No wheezing/rhonchi/rales. No accessory muscle use.     CV:  Regular rhythm, normal rate, no murmurs, gallops, rubs    GI: Soft, non distended, non tender. normoactive bowel sounds, no hepatosplenomegaly     Musculoskeletal:  No edema, warm, 2+ pulses throughout    Neurologic:  Moves all extremities. AAOx3, CN II-XII reviewed            Data Review:    Review and/or order of clinical lab test  Review and/or order of tests in the radiology section of CPT  Review and/or order of tests in the medicine section of CPT    I have independently reviewed and interpreted patient's lab and all other diagnostic data    Labs:     Recent Labs     02/04/23 0240 02/03/23 0448   WBC 14.5* 7.8   HGB 11.2* 11.0*   HCT 34.4* 32.3*   * 439*       Recent Labs     02/04/23 0240 02/03/23 0448 02/02/23  1529    144 136   K 4.1 2.9* 4.9   * 112* 103   CO2 24 27 25   BUN 4* 6 8   CREA 0.67 0.70 0.84   * 77 300*   CA 8.8 8.5 8.9   MG  --  2.0  --        Recent Labs     02/04/23 0240 02/03/23 0448 02/02/23  1529   ALT 53 51 71   AP 69 65 86   TBILI 0.3 0.4 0.5   TP 6.9 6.0* 7.2   ALB 3.7 3.1* 3.7   GLOB 3.2 2.9 3.5   LPSE  --  27*  --        No results for input(s): INR, PTP, APTT, INREXT, INREXT in the last 72 hours. No results for input(s): FE, TIBC, PSAT, FERR in the last 72 hours. Lab Results   Component Value Date/Time    Folate 1.6 (L) 03/09/2015 04:15 AM        No results for input(s): PH, PCO2, PO2 in the last 72 hours. No results for input(s): CPK, CKNDX, TROIQ in the last 72 hours.     No lab exists for component: CPKMB  Lab Results   Component Value Date/Time    Cholesterol, total 169 05/05/2015 04:12 AM    HDL Cholesterol 31 05/05/2015 04:12 AM    LDL, calculated 110.4 (H) 05/05/2015 04:12 AM    Triglyceride 138 05/05/2015 04:12 AM    CHOL/HDL Ratio 5.5 (H) 05/05/2015 04:12 AM     Lab Results   Component Value Date/Time    Glucose (POC) 132 (H) 02/04/2023 05:13 AM    Glucose (POC) 94 02/03/2023 11:30 PM    Glucose (POC) 78 02/03/2023 08:27 PM    Glucose (POC) 126 (H) 02/03/2023 04:34 PM    Glucose (POC) 56 (L) 02/03/2023 04:15 PM     Lab Results   Component Value Date/Time    Color YELLOW/STRAW 02/02/2023 03:30 PM    Appearance CLEAR 02/02/2023 03:30 PM    Specific gravity 1.010 02/02/2023 03:30 PM    Specific gravity 1.020 07/10/2021 02:19 AM    pH (UA) 5.5 02/02/2023 03:30 PM    Protein Negative 02/02/2023 03:30 PM    Glucose >1,000 (A) 02/02/2023 03:30 PM    Ketone Negative 02/02/2023 03:30 PM    Bilirubin Negative 02/02/2023 03:30 PM    Urobilinogen 0.2 02/02/2023 03:30 PM    Nitrites Negative 02/02/2023 03:30 PM    Leukocyte Esterase TRACE (A) 02/02/2023 03:30 PM    Epithelial cells FEW 02/02/2023 03:30 PM    Bacteria Negative 02/02/2023 03:30 PM    WBC 0-4 02/02/2023 03:30 PM    RBC 0-5 02/02/2023 03:30 PM       Notes reviewed from all clinical/nonclinical/nursing services involved in patient's clinical care. Care coordination discussions were held with appropriate clinical/nonclinical/ nursing providers based on care coordination needs. Patients current active Medications were reviewed, considered, added and adjusted based on the clinical condition today. Home Medications were reconciled to the best of my ability given all available resources at the time of admission. Route is PO if not otherwise noted.       Medications Reviewed:     Current Facility-Administered Medications   Medication Dose Route Frequency    lipase-protease-amylase (ZENPEP 20,000) capsule 1 Capsule  1 Capsule Oral TID WITH MEALS    atorvastatin (LIPITOR) tablet 40 mg  40 mg Oral QHS    dicyclomine (BENTYL) tablet 20 mg  20 mg Oral I2D PRN    folic acid (FOLVITE) tablet 1 mg  1 mg Oral DAILY    levothyroxine (SYNTHROID) tablet 112 mcg  112 mcg Oral ACB    sertraline (ZOLOFT) tablet 50 mg  50 mg Oral DAILY    traZODone (DESYREL) tablet 100 mg  100 mg Oral QHS    sodium chloride (NS) flush 5-40 mL  5-40 mL IntraVENous Q8H    sodium chloride (NS) flush 5-40 mL  5-40 mL IntraVENous PRN    acetaminophen (TYLENOL) tablet 650 mg  650 mg Oral Q6H PRN    Or    acetaminophen (TYLENOL) suppository 650 mg  650 mg Rectal Q6H PRN    polyethylene glycol (MIRALAX) packet 17 g  17 g Oral DAILY PRN    ondansetron (ZOFRAN ODT) tablet 4 mg  4 mg Oral Q8H PRN    Or    ondansetron (ZOFRAN) injection 4 mg  4 mg IntraVENous Q6H PRN    enoxaparin (LOVENOX) injection 40 mg  40 mg SubCUTAneous DAILY    0.9% sodium chloride infusion  125 mL/hr IntraVENous CONTINUOUS    glucose chewable tablet 16 g  4 Tablet Oral PRN    glucagon (GLUCAGEN) injection 1 mg  1 mg IntraMUSCular PRN    dextrose 10 % infusion 0-250 mL  0-250 mL IntraVENous PRN    insulin lispro (HUMALOG) injection   SubCUTAneous Q6H    insulin pump (PATIENT SUPPLIED)   SubCUTAneous PRN    dextrose 10 % infusion 0-250 mL  0-250 mL IntraVENous PRN    buPROPion SR (WELLBUTRIN SR) tablet 200 mg  200 mg Oral BID    gabapentin (NEURONTIN) capsule 600 mg  600 mg Oral QID    DULoxetine (CYMBALTA) capsule 60 mg  60 mg Oral DAILY    HYDROmorphone (DILAUDID) injection 1-2 mg  1-2 mg IntraVENous Q4H PRN     ______________________________________________________________________  EXPECTED LENGTH OF STAY: 2d 14h  ACTUAL LENGTH OF STAY:          2                 Diego Alegria MD

## 2023-02-04 NOTE — PROGRESS NOTES
I have read and agree with the [address] [place] [address], RN documentation.     Harish Bill RN

## 2023-02-04 NOTE — PROGRESS NOTES
Problem: Pain  Goal: *Control of Pain  Outcome: Progressing Towards Goal     Problem: Diabetes Self-Management  Goal: *Disease process and treatment process  Description: Define diabetes and identify own type of diabetes; list 3 options for treating diabetes.   Outcome: Progressing Towards Goal     Problem: Patient Education: Go to Patient Education Activity  Goal: Patient/Family Education  Outcome: Progressing Towards Goal

## 2023-02-04 NOTE — PROGRESS NOTES
[facility]  [facility] Josef Damon, [address]  [phone]                GI PROGRESS NOTE      NAME:   Hernando Saucedo   :    1975   MRN:    [id number]     Assessment/Plan   Abdominal pain with nausea-likely related to gastroparesis and adhesions from the previous surgical procedures. Continue conservative management and consider adding Reglan to help promote GI motility. Carefully balance the opiate analgesics as they could potentially worsen the underlying gastrointestinal dysmotility. Clear liquids as tolerated     Patient Active Problem List   Diagnosis Code    Acute pancreatitis K85.90    Family hx of ovarian malignancy Z80.41    Family hx-breast malignancy Z80.3    Breast swelling N63.0    Necrotizing pancreatitis K85.91    Pancreatic pseudocyst K86.3    Hyperglycemia R73.9    Intractable nausea and vomiting R11.2    DM I (di[facility] mellitus, type [facility]) [facility] E10.9       Subjective:     Hernando Saucedo is a 49 y.o.  female who was admitted with abdominal pain with nausea and vomiting. She states that she still has nausea and woke up with pain. She has been getting opiate analgesics around-the-clock and is asking for more pain medications as the current dose has not been helping her. She has not had a bowel movement ever since she started getting pain meds.     Review of Systems    Constitutional: negative fever, negative chills, negative weight loss  Eyes:   negative visual changes  ENT:   negative sore throat, tongue or lip swelling  Respiratory:  negative cough, negative dyspnea  Cards:  negative for chest pain, palpitations, lower extremity edema  GI:   See HPI  :  negative for frequency, dysuria  Integument:  negative for rash and pruritus  Heme:  negative for easy bruising and gum/nose bleeding  Musculoskel: negative for myalgias,  back pain and muscle weakness  Neuro: negative for headaches, dizziness, vertigo  Psych:  negative for feelings of anxiety, depression           Objective:     VITALS:   Last 24hrs VS reviewed since prior hospitalist progress note. Most recent are:  Visit Vitals  BP (!) 154/99 (BP 1 Location: Right upper arm, BP Patient Position: At rest)   Pulse 85   Temp 99 °F (37.2 °C)   Resp 28   Ht 5' 2\" (1.575 m)   Wt 65.2 kg (143 lb 11.8 oz)   SpO2 90%   BMI 26.29 kg/m²     No intake or output data in the 24 hours ending 02/04/23 1114     PHYSICAL EXAM:  General   well developed, well nourished, appears stated age, in no acute distress  EENT  Normocephalic, Atraumatic, PERRLA, EOMI, sclera clear, nares clear, pharynx normal  Neck   Supple without nodes or mass. No thyromegaly or bruit  Respiratory   Clear To Auscultation bilaterally - no wheezes, rales, rhonchi, or crackles  Cardiology  Regular Rate and Rythmn  - no murmurs, rubs or gallops  Abdominal  Soft, non-tender, non-distended, positive bowel sounds, no hepatosplenomegaly, no palpable mass  Extremities  No clubbing, cyanosis, or edema. Pulses intact. Back  No spinal or muscle pain. No CVAT. Skin  Normal skin turgor. No rashes or skin ulcers noted  Neurological  No focal neurological deficits noted  Psychological  Oriented x 3. Normal affect.        Lab Data   Recent Results (from the past 12 hour(s))   GLUCOSE, POC    Collection Time: 02/03/23 11:30 PM   Result Value Ref Range    Glucose (POC) 94 65 - 117 mg/dL    Performed by Lissa Galarza    CBC W/O DIFF    Collection Time: 02/04/23  2:40 AM   Result Value Ref Range    WBC 14.5 (H) 3.6 - 11.0 K/uL    RBC 3.77 (L) 3.80 - 5.20 M/uL    HGB 11.2 (L) 11.5 - 16.0 g/dL    HCT 34.4 (L) 35.0 - 47.0 %    MCV 91.2 80.0 - 99.0 FL    MCH 29.7 26.0 - 34.0 PG    MCHC 32.6 30.0 - 36.5 g/dL    RDW 13.7 11.5 - 14.5 %    PLATELET 621 (H) 270 - 400 K/uL    MPV 10.2 8.9 - 12.9 FL    NRBC 0.0 0  WBC    ABSOLUTE NRBC 0.00 0.00 - 2.33 K/uL   METABOLIC PANEL, COMPREHENSIVE    Collection Time: 02/04/23  2:40 AM   Result Value Ref Range    Sodium 138 136 - 145 mmol/L    Potassium 4.1 3.5 - 5.1 mmol/L    Chloride 109 (H) 97 - 108 mmol/L    CO2 24 21 - 32 mmol/L    Anion gap 5 5 - 15 mmol/L    Glucose 116 (H) 65 - 100 mg/dL    BUN 4 (L) 6 - 20 MG/DL    Creatinine 0.67 0.55 - 1.02 MG/DL    BUN/Creatinine ratio 6 (L) 12 - 20      eGFR >60 >60 ml/min/1.73m2    Calcium 8.8 8.5 - 10.1 MG/DL    Bilirubin, total 0.3 0.2 - 1.0 MG/DL    ALT (SGPT) 53 12 - 78 U/L    AST (SGOT) 34 15 - 37 U/L    Alk.  phosphatase 69 45 - 117 U/L    Protein, total 6.9 6.4 - 8.2 g/dL    Albumin 3.7 3.5 - 5.0 g/dL    Globulin 3.2 2.0 - 4.0 g/dL    A-G Ratio 1.2 1.1 - 2.2     GLUCOSE, POC    Collection Time: 02/04/23  5:13 AM   Result Value Ref Range    Glucose (POC) 132 (H) 65 - 117 mg/dL    Performed by Hadley Joseph          Medications: Reviewed    PMH/SH reviewed - no change compared to H&P  Attending Physician: Kevin Noel MD   Date/Time:  2/4/2023

## 2023-02-05 ENCOUNTER — APPOINTMENT (OUTPATIENT)
Dept: CT IMAGING | Age: 48
DRG: 073 | End: 2023-02-05
Attending: HOSPITALIST
Payer: COMMERCIAL

## 2023-02-05 ENCOUNTER — APPOINTMENT (OUTPATIENT)
Dept: GENERAL RADIOLOGY | Age: 48
DRG: 073 | End: 2023-02-05
Attending: HOSPITALIST
Payer: COMMERCIAL

## 2023-02-05 LAB
ALBUMIN SERPL-MCNC: 3.7 G/DL (ref 3.5–5)
ALBUMIN/GLOB SERPL: 1.1 (ref 1.1–2.2)
ALP SERPL-CCNC: 75 U/L (ref 45–117)
ALT SERPL-CCNC: 51 U/L (ref 12–78)
ANION GAP SERPL CALC-SCNC: 4 MMOL/L (ref 5–15)
AST SERPL-CCNC: 36 U/L (ref 15–37)
ATRIAL RATE: 115 BPM
B PERT DNA SPEC QL NAA+PROBE: NOT DETECTED
BILIRUB SERPL-MCNC: 0.6 MG/DL (ref 0.2–1)
BORDETELLA PARAPERTUSSIS PCR, BORPAR: NOT DETECTED
BUN SERPL-MCNC: 6 MG/DL (ref 6–20)
BUN/CREAT SERPL: 7 (ref 12–20)
C PNEUM DNA SPEC QL NAA+PROBE: NOT DETECTED
CALCIUM SERPL-MCNC: 8.6 MG/DL (ref 8.5–10.1)
CALCULATED P AXIS, ECG09: 47 DEGREES
CALCULATED R AXIS, ECG10: 8 DEGREES
CALCULATED T AXIS, ECG11: 8 DEGREES
CHLORIDE SERPL-SCNC: 105 MMOL/L (ref 97–108)
CO2 SERPL-SCNC: 28 MMOL/L (ref 21–32)
CREAT SERPL-MCNC: 0.87 MG/DL (ref 0.55–1.02)
DIAGNOSIS, 93000: NORMAL
ERYTHROCYTE [DISTWIDTH] IN BLOOD BY AUTOMATED COUNT: 14.1 % (ref 11.5–14.5)
FLUAV SUBTYP SPEC NAA+PROBE: NOT DETECTED
FLUBV RNA SPEC QL NAA+PROBE: NOT DETECTED
GLOBULIN SER CALC-MCNC: 3.3 G/DL (ref 2–4)
GLUCOSE BLD STRIP.AUTO-MCNC: 114 MG/DL (ref 65–117)
GLUCOSE BLD STRIP.AUTO-MCNC: 122 MG/DL (ref 65–117)
GLUCOSE BLD STRIP.AUTO-MCNC: 145 MG/DL (ref 65–117)
GLUCOSE BLD STRIP.AUTO-MCNC: 152 MG/DL (ref 65–117)
GLUCOSE BLD STRIP.AUTO-MCNC: 252 MG/DL (ref 65–117)
GLUCOSE BLD STRIP.AUTO-MCNC: 273 MG/DL (ref 65–117)
GLUCOSE BLD STRIP.AUTO-MCNC: 70 MG/DL (ref 65–117)
GLUCOSE BLD STRIP.AUTO-MCNC: 83 MG/DL (ref 65–117)
GLUCOSE BLD STRIP.AUTO-MCNC: 86 MG/DL (ref 65–117)
GLUCOSE SERPL-MCNC: 147 MG/DL (ref 65–100)
HADV DNA SPEC QL NAA+PROBE: NOT DETECTED
HCOV 229E RNA SPEC QL NAA+PROBE: NOT DETECTED
HCOV HKU1 RNA SPEC QL NAA+PROBE: NOT DETECTED
HCOV NL63 RNA SPEC QL NAA+PROBE: NOT DETECTED
HCOV OC43 RNA SPEC QL NAA+PROBE: NOT DETECTED
HCT VFR BLD AUTO: 34.9 % (ref 35–47)
HGB BLD-MCNC: 11 G/DL (ref 11.5–16)
HMPV RNA SPEC QL NAA+PROBE: NOT DETECTED
HPIV1 RNA SPEC QL NAA+PROBE: NOT DETECTED
HPIV2 RNA SPEC QL NAA+PROBE: NOT DETECTED
HPIV3 RNA SPEC QL NAA+PROBE: NOT DETECTED
HPIV4 RNA SPEC QL NAA+PROBE: NOT DETECTED
M PNEUMO DNA SPEC QL NAA+PROBE: NOT DETECTED
MCH RBC QN AUTO: 29.6 PG (ref 26–34)
MCHC RBC AUTO-ENTMCNC: 31.5 G/DL (ref 30–36.5)
MCV RBC AUTO: 94.1 FL (ref 80–99)
NRBC # BLD: 0 K/UL (ref 0–0.01)
NRBC BLD-RTO: 0 PER 100 WBC
P-R INTERVAL, ECG05: 152 MS
PLATELET # BLD AUTO: 415 K/UL (ref 150–400)
PMV BLD AUTO: 10.4 FL (ref 8.9–12.9)
POTASSIUM SERPL-SCNC: 3.9 MMOL/L (ref 3.5–5.1)
PROT SERPL-MCNC: 7 G/DL (ref 6.4–8.2)
Q-T INTERVAL, ECG07: 296 MS
QRS DURATION, ECG06: 74 MS
QTC CALCULATION (BEZET), ECG08: 409 MS
RBC # BLD AUTO: 3.71 M/UL (ref 3.8–5.2)
RSV RNA SPEC QL NAA+PROBE: NOT DETECTED
RV+EV RNA SPEC QL NAA+PROBE: NOT DETECTED
SARS-COV-2 RDRP RESP QL NAA+PROBE: NOT DETECTED
SARS-COV-2 RNA RESP QL NAA+PROBE: NOT DETECTED
SERVICE CMNT-IMP: ABNORMAL
SERVICE CMNT-IMP: NORMAL
SODIUM SERPL-SCNC: 137 MMOL/L (ref 136–145)
SOURCE, COVRS: NORMAL
VENTRICULAR RATE, ECG03: 115 BPM
WBC # BLD AUTO: 8.2 K/UL (ref 3.6–11)

## 2023-02-05 PROCEDURE — 87635 SARS-COV-2 COVID-19 AMP PRB: CPT

## 2023-02-05 PROCEDURE — 80053 COMPREHEN METABOLIC PANEL: CPT

## 2023-02-05 PROCEDURE — 74011000250 HC RX REV CODE- 250: Performed by: STUDENT IN AN ORGANIZED HEALTH CARE EDUCATION/TRAINING PROGRAM

## 2023-02-05 PROCEDURE — 74011250636 HC RX REV CODE- 250/636: Performed by: HOSPITALIST

## 2023-02-05 PROCEDURE — 71045 X-RAY EXAM CHEST 1 VIEW: CPT

## 2023-02-05 PROCEDURE — 0202U NFCT DS 22 TRGT SARS-COV-2: CPT

## 2023-02-05 PROCEDURE — 74011636637 HC RX REV CODE- 636/637: Performed by: STUDENT IN AN ORGANIZED HEALTH CARE EDUCATION/TRAINING PROGRAM

## 2023-02-05 PROCEDURE — 74011250636 HC RX REV CODE- 250/636: Performed by: STUDENT IN AN ORGANIZED HEALTH CARE EDUCATION/TRAINING PROGRAM

## 2023-02-05 PROCEDURE — 36415 COLL VENOUS BLD VENIPUNCTURE: CPT

## 2023-02-05 PROCEDURE — 85027 COMPLETE CBC AUTOMATED: CPT

## 2023-02-05 PROCEDURE — 74011250637 HC RX REV CODE- 250/637: Performed by: STUDENT IN AN ORGANIZED HEALTH CARE EDUCATION/TRAINING PROGRAM

## 2023-02-05 PROCEDURE — 74011000258 HC RX REV CODE- 258: Performed by: HOSPITALIST

## 2023-02-05 PROCEDURE — 82962 GLUCOSE BLOOD TEST: CPT

## 2023-02-05 PROCEDURE — 70450 CT HEAD/BRAIN W/O DYE: CPT

## 2023-02-05 PROCEDURE — 74011250637 HC RX REV CODE- 250/637: Performed by: HOSPITALIST

## 2023-02-05 PROCEDURE — 65270000046 HC RM TELEMETRY

## 2023-02-05 PROCEDURE — 93005 ELECTROCARDIOGRAM TRACING: CPT

## 2023-02-05 RX ORDER — HYDROMORPHONE HYDROCHLORIDE 2 MG/ML
2 INJECTION, SOLUTION INTRAMUSCULAR; INTRAVENOUS; SUBCUTANEOUS
Status: DISCONTINUED | OUTPATIENT
Start: 2023-02-05 | End: 2023-02-06

## 2023-02-05 RX ADMIN — ENOXAPARIN SODIUM 40 MG: 100 INJECTION SUBCUTANEOUS at 08:40

## 2023-02-05 RX ADMIN — PIPERACILLIN AND TAZOBACTAM 3.38 G: 3; .375 INJECTION, POWDER, FOR SOLUTION INTRAVENOUS at 23:24

## 2023-02-05 RX ADMIN — HYDROMORPHONE HYDROCHLORIDE 3 MG: 2 INJECTION, SOLUTION INTRAMUSCULAR; INTRAVENOUS; SUBCUTANEOUS at 11:18

## 2023-02-05 RX ADMIN — SODIUM CHLORIDE, PRESERVATIVE FREE 10 ML: 5 INJECTION INTRAVENOUS at 06:45

## 2023-02-05 RX ADMIN — LEVOTHYROXINE SODIUM 112 MCG: 0.11 TABLET ORAL at 06:44

## 2023-02-05 RX ADMIN — DULOXETINE HYDROCHLORIDE 60 MG: 30 CAPSULE, DELAYED RELEASE ORAL at 08:49

## 2023-02-05 RX ADMIN — Medication 2 UNITS: at 00:45

## 2023-02-05 RX ADMIN — HYDROXYZINE HYDROCHLORIDE 25 MG: 25 TABLET, FILM COATED ORAL at 05:25

## 2023-02-05 RX ADMIN — GABAPENTIN 600 MG: 300 CAPSULE ORAL at 08:49

## 2023-02-05 RX ADMIN — PIPERACILLIN AND TAZOBACTAM 4.5 G: 4; .5 INJECTION, POWDER, FOR SOLUTION INTRAVENOUS at 16:07

## 2023-02-05 RX ADMIN — SODIUM CHLORIDE 125 ML/HR: 9 INJECTION, SOLUTION INTRAVENOUS at 16:54

## 2023-02-05 RX ADMIN — Medication 3 UNITS: at 23:31

## 2023-02-05 RX ADMIN — GABAPENTIN 600 MG: 300 CAPSULE ORAL at 13:31

## 2023-02-05 RX ADMIN — PANCRELIPASE LIPASE, PANCRELIPASE PROTEASE, PANCRELIPASE AMYLASE 1 CAPSULE: 20000; 63000; 84000 CAPSULE, DELAYED RELEASE ORAL at 12:16

## 2023-02-05 RX ADMIN — Medication 2 UNITS: at 17:15

## 2023-02-05 RX ADMIN — DICYCLOMINE HYDROCHLORIDE 20 MG: 20 TABLET ORAL at 08:49

## 2023-02-05 RX ADMIN — HYDROMORPHONE HYDROCHLORIDE 3 MG: 2 INJECTION, SOLUTION INTRAMUSCULAR; INTRAVENOUS; SUBCUTANEOUS at 05:25

## 2023-02-05 RX ADMIN — SERTRALINE HYDROCHLORIDE 50 MG: 50 TABLET ORAL at 08:50

## 2023-02-05 RX ADMIN — ONDANSETRON HYDROCHLORIDE 4 MG: 2 SOLUTION INTRAMUSCULAR; INTRAVENOUS at 08:39

## 2023-02-05 RX ADMIN — HYDROMORPHONE HYDROCHLORIDE 2 MG: 2 INJECTION, SOLUTION INTRAMUSCULAR; INTRAVENOUS; SUBCUTANEOUS at 20:46

## 2023-02-05 RX ADMIN — HYDROXYZINE HYDROCHLORIDE 25 MG: 25 TABLET, FILM COATED ORAL at 22:31

## 2023-02-05 RX ADMIN — PANCRELIPASE LIPASE, PANCRELIPASE PROTEASE, PANCRELIPASE AMYLASE 1 CAPSULE: 20000; 63000; 84000 CAPSULE, DELAYED RELEASE ORAL at 08:50

## 2023-02-05 RX ADMIN — PANCRELIPASE LIPASE, PANCRELIPASE PROTEASE, PANCRELIPASE AMYLASE 1 CAPSULE: 20000; 63000; 84000 CAPSULE, DELAYED RELEASE ORAL at 17:16

## 2023-02-05 RX ADMIN — ACETAMINOPHEN 650 MG: 325 TABLET ORAL at 20:50

## 2023-02-05 RX ADMIN — FOLIC ACID 1 MG: 1 TABLET ORAL at 08:50

## 2023-02-05 RX ADMIN — SODIUM CHLORIDE, PRESERVATIVE FREE 10 ML: 5 INJECTION INTRAVENOUS at 13:34

## 2023-02-05 RX ADMIN — GABAPENTIN 600 MG: 300 CAPSULE ORAL at 22:31

## 2023-02-05 RX ADMIN — ATORVASTATIN CALCIUM 40 MG: 40 TABLET, FILM COATED ORAL at 22:31

## 2023-02-05 RX ADMIN — SODIUM CHLORIDE, PRESERVATIVE FREE 10 ML: 5 INJECTION INTRAVENOUS at 08:40

## 2023-02-05 RX ADMIN — BUPROPION HYDROCHLORIDE 450 MG: 150 TABLET, EXTENDED RELEASE ORAL at 08:51

## 2023-02-05 RX ADMIN — HYDROMORPHONE HYDROCHLORIDE 3 MG: 2 INJECTION, SOLUTION INTRAMUSCULAR; INTRAVENOUS; SUBCUTANEOUS at 00:55

## 2023-02-05 RX ADMIN — GABAPENTIN 600 MG: 300 CAPSULE ORAL at 17:15

## 2023-02-05 RX ADMIN — SODIUM CHLORIDE, PRESERVATIVE FREE 10 ML: 5 INJECTION INTRAVENOUS at 22:32

## 2023-02-05 RX ADMIN — SODIUM CHLORIDE, PRESERVATIVE FREE 10 ML: 5 INJECTION INTRAVENOUS at 11:18

## 2023-02-05 NOTE — PROGRESS NOTES
02/05/23 1755   Vitals   Temp (!) 100.7 °F (38.2 °C)   Temp Source Oral   Pulse (Heart Rate) (!) 112   Heart Rate Source Monitor   Resp Rate 18   O2 Sat (%) 93 %   Level of Consciousness 0   /70   MAP (Calculated) 89   BP 1 Location Left upper arm   BP 1 Method Automatic   BP Patient Position Semi fowlers   MEWS Score 3     1755- patient began to desat to 75% on RA. Came back up to 93% on 2L. MD notified and came to bedside. MD ordered chest x-ray, respiratory panel, and transfer to higher level of care.

## 2023-02-05 NOTE — PROGRESS NOTES
Pt appears shaky, anxious, and aloof. BS was checked. Results were WDL at 112. No other symptoms of hypoglycemia. Pt denied drinking alcohol at home. MD made aware. MD visited with patient at bedside and decreased Dilaudid to 2 mg PRN. Pt's  tested positive for COVID today. Rapid covid test performed on pt. Came back negative. Pt is still tremoring with increased confusion. MD made aware. Received MD orders to hold IV dilaudid for now and continue to monitor. 02/05/23 1755   Vitals   Temp (!) 100.7 °F (38.2 °C)   Temp Source Oral   Pulse (Heart Rate) (!) 112   Heart Rate Source Monitor   Resp Rate 18   O2 Sat (%) 93 %   Level of Consciousness 0   /70   MAP (Calculated) 89   BP 1 Location Left upper arm   BP 1 Method Automatic   BP Patient Position Semi fowlers   MEWS Score 3     1755- patient began to desat to 75% O2. Came back up to 93% on 2L. MD made aware and came to bedside. MD ordered chest x-ray, respiratory panel, and transfer to higher level of care.

## 2023-02-05 NOTE — PROGRESS NOTES
Problem: Patient Education: Go to Patient Education Activity  Goal: Patient/Family Education  Outcome: Progressing Towards Goal     Problem: Diabetes Self-Management  Goal: *Disease process and treatment process  Description: Define diabetes and identify own type of diabetes; list 3 options for treating diabetes.   Outcome: Progressing Towards Goal

## 2023-02-05 NOTE — PROGRESS NOTES
TRANSFER - OUT REPORT:    Verbal report given to KELLY Khalil on [place]  being transferred to (unit) for change in patient condition(Oxygen desaturation and confusion)       Report consisted of patients Situation, Background, Assessment and   Recommendations(SBAR). Information from the following report(s) SBAR, Kardex, MAR, and Recent Results was reviewed with the receiving nurse. Lines:   Peripheral IV 02/02/23 Anterior;Proximal;Right Forearm (Active)   Site Assessment Clean, dry, & intact 02/05/23 0840   Phlebitis Assessment 0 02/05/23 0840   Infiltration Assessment 0 02/05/23 0840   Dressing Status Clean, dry, & intact 02/05/23 0840   Dressing Type Transparent 02/05/23 0840   Hub Color/Line Status Pink; Infusing;Patent 02/05/23 0840   Action Taken Open ports on tubing capped 02/05/23 0840   Alcohol Cap Used Yes 02/05/23 0840       Patient Supplied Insulin Pump 02/02/23 (Active)   Site Assessment Clean, dry, & intact 02/05/23 0840   Date of Last Site Change 02/04/23 02/04/23 1049   Pump Status Infusing 02/04/23 1049   Patient able to care for pump? Yes 02/04/23 1049       Continuous Glucose Monitor/Sensor Device (Active)   Site Assessment Clean, dry, & intact 02/05/23 0840   Dressing Status Clean, dry, & intact 02/05/23 0840   Date of Last Insertion 02/04/23 02/04/23 1049   Monitor/Sensor Continued on Admission Yes 02/04/23 1049   Patient Able to Care for Monitor/Sensor Yes 02/04/23 1049   Extra Supplies Available Yes 02/04/23 1049   Monitor/Sensor Agreement Signed Yes 02/04/23 1049        Opportunity for questions and clarification was provided.       Patient transported with:   O2 @ 2 liters  Patient's medications from home  Patient-specific medications from Pharmacy  Registered Nurse  Tech

## 2023-02-05 NOTE — PROGRESS NOTES
Problem: Diabetes Self-Management  Goal: *Disease process and treatment process  Description: Define diabetes and identify own type of diabetes; list 3 options for treating diabetes. Outcome: Progressing Towards Goal  Goal: *Incorporating nutritional management into lifestyle  Description: Describe effect of type, amount and timing of food on blood glucose; list 3 methods for planning meals. Outcome: Progressing Towards Goal  Goal: *Monitoring blood glucose, interpreting and using results  Description: Identify recommended blood glucose targets  and personal targets.   Outcome: Progressing Towards Goal  Goal: *Insulin pump training  Outcome: Progressing Towards Goal

## 2023-02-05 NOTE — PROGRESS NOTES
[address] [facility] Adult  Hospitalist Group                                                                                          Hospitalist Progress Note  Winston Antony MD  Answering service: [phone] from in house phone        Date of Service:  2023  NAME:  Claudell Knoll  :  1975  MRN:  [id number]       Admission Summary:     Claudell Knoll is a 49 y.o. 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 Endorses hx of recurrent episodes episodes of nausea requiring admission. Has also had some associated loose stools. Denies any associated melena or hematochezia. The patient denies any fever, chills, chest pain, ongoing nausea, vomiting, cough, congestion, recent illness, palpitations, or dysuria. Remarkable vitals on ER Presentation: vss  Labs Remarkable for: glu 300,   ER Images: ct abd et pelvis: no acute process  ER rx: morphine, zofran, 1L NS Bolus     Interval history / Subjective:     Patient is seen and examined. She looks lethargic, answered questions, looks shaking. Denied alcohol use, extremities weakness. Assessment & Plan:     Intractable nausea and vomiting possible due to gastroparesis and pancreatic insufficiency  -advance diet to full liquid diet   -CT abdomen and pelvis no acute abdominal or pelvic pathology, extensive postsurgical change, no significant change  -Afebrile, no leukocytosis  -Continue normal saline, Pepcid, pancreatic enzyme, prn bentyl, zofran,   -cut back her iv dilaudid to 2 mg q 4 hrs, patient appears lethargic,   -GI is consulted    Hypoxia ?  Aspiration   -SpO2 86% on RA, tachycardic   -SpO2 improved with oxygen 93% with nasal oxygen  -COVID19 Rapid test negative   -chest x ray   -iv zosyn  -blood cx, monitor pulse ox     Altered mental status  -conscious and alert, had episode of shaking  -CT head   -continue neuro check  -ammonia level  -consult to neurologist     T2DM insulin dependent, s/p Islet cell transplant  -had episode of hypoglycemia this morning, treated and improved  -A1c 8.1  -On insulin pump, sliding scale and monitor fingerstick glucose    Chronic pancreatic insufficiency  -Patient with intractable nausea and vomiting  -Continue pancreatic enzyme, IV fluid, as needed Zofran    Hx of MDD/AYDIN  -Stable, continue home Cymbalta, Zoloft and trazodone    Hx of HLD  -Continue home Lipitor       Code status: Full code  Prophylaxis: Lovenox  Care Plan discussed with: Patient, nurse and CM  Anticipated Disposition: Home with family, 24-48 hours  Inpatient  Cardiac monitoring: None     Hospital Problems  Date Reviewed: 5/4/2015            Codes Class Noted POA    Hyperglycemia ICD-10-CM: R73.9  ICD-9-CM: 790.29  2/2/2023 Unknown        Intractable nausea and vomiting ICD-10-CM: R11.2  ICD-9-CM: 536.2  2/2/2023 Unknown        DM I (diabetes mellitus, type I) ([facility] ICD-10-CM: E10.9  ICD-9-CM: 250.01  2/2/2023 Unknown         Social Determinants of Health     Tobacco Use: Medium Risk    Smoking Tobacco Use: Former    Smokeless Tobacco Use: Never    Passive Exposure: Not on file   Alcohol Use: Not on file   Financial Resource Strain: Not on file   Food Insecurity: Not on file   Transportation Needs: Not on file   Physical Activity: Not on file   Stress: Not on file   Social Connections: Not on file   Intimate Partner Violence: Not on file   Depression: Not on file   Housing Stability: Not on file       Vital Signs:    Last 24hrs VS reviewed since prior progress note.  Most recent are:  Visit Vitals  /65 (BP 1 Location: Left upper arm, BP Patient Position: At rest)   Pulse (!) 106   Temp 98.3 °F (36.8 °C)   Resp 18   Ht 5' 2\" (1.575 m)   Wt 65.2 kg (143 lb 11.8 oz)   SpO2 95%   BMI 26.29 kg/m²       No intake or output data in the 24 hours ending 02/05/23 1200 Physical Examination:     I had a face to face encounter with this patient and independently examined them on 2/5/2023 as outlined below:          Constitutional:  No acute distress, cooperative, pleasant    ENT:  Oral mucosa moist, oropharynx benign. Resp:  CTA bilaterally. No wheezing/rhonchi/rales. No accessory muscle use. CV:  Regular rhythm, normal rate, no murmurs, gallops, rubs    GI:  Soft, non distended, non tender. normoactive bowel sounds, no hepatosplenomegaly     Musculoskeletal:  No edema, warm, 2+ pulses throughout    Neurologic:  Moves all extremities. AAOx3, CN II-XII reviewed            Data Review:    Review and/or order of clinical lab test  Review and/or order of tests in the radiology section of CPT  Review and/or order of tests in the medicine section of CPT    I have independently reviewed and interpreted patient's lab and all other diagnostic data    Labs:     Recent Labs     02/05/23 0032 02/04/23 0240   WBC 8.2 14.5*   HGB 11.0* 11.2*   HCT 34.9* 34.4*   * 456*       Recent Labs     02/05/23 0032 02/04/23 0240 02/03/23  0448    138 144   K 3.9 4.1 2.9*    109* 112*   CO2 28 24 27   BUN 6 4* 6   CREA 0.87 0.67 0.70   * 116* 77   CA 8.6 8.8 8.5   MG  --   --  2.0       Recent Labs     02/05/23 0032 02/04/23 0240 02/03/23  0448   ALT 51 53 51   AP 75 69 65   TBILI 0.6 0.3 0.4   TP 7.0 6.9 6.0*   ALB 3.7 3.7 3.1*   GLOB 3.3 3.2 2.9   LPSE  --   --  27*       No results for input(s): INR, PTP, APTT, INREXT, INREXT in the last 72 hours. No results for input(s): FE, TIBC, PSAT, FERR in the last 72 hours. Lab Results   Component Value Date/Time    Folate 1.6 (L) 03/09/2015 04:15 AM        No results for input(s): PH, PCO2, PO2 in the last 72 hours. No results for input(s): CPK, CKNDX, TROIQ in the last 72 hours.     No lab exists for component: CPKMB  Lab Results   Component Value Date/Time    Cholesterol, total 169 05/05/2015 04:12 AM    HDL Cholesterol 31 05/05/2015 04:12 AM    LDL, calculated 110.4 (H) 05/05/2015 04:12 AM    Triglyceride 138 05/05/2015 04:12 AM    CHOL/HDL Ratio 5.5 (H) 05/05/2015 04:12 AM     Lab Results   Component Value Date/Time    Glucose (POC) 122 (H) 02/05/2023 11:30 AM    Glucose (POC) 83 02/05/2023 09:13 AM    Glucose (POC) 86 02/05/2023 06:04 AM    Glucose (POC) 70 02/05/2023 05:12 AM    Glucose (POC) 145 (H) 02/05/2023 12:12 AM     Lab Results   Component Value Date/Time    Color YELLOW/STRAW 02/02/2023 03:30 PM    Appearance CLEAR 02/02/2023 03:30 PM    Specific gravity 1.010 02/02/2023 03:30 PM    Specific gravity 1.020 07/10/2021 02:19 AM    pH (UA) 5.5 02/02/2023 03:30 PM    Protein Negative 02/02/2023 03:30 PM    Glucose >1,000 (A) 02/02/2023 03:30 PM    Ketone Negative 02/02/2023 03:30 PM    Bilirubin Negative 02/02/2023 03:30 PM    Urobilinogen 0.2 02/02/2023 03:30 PM    Nitrites Negative 02/02/2023 03:30 PM    Leukocyte Esterase TRACE (A) 02/02/2023 03:30 PM    Epithelial cells FEW 02/02/2023 03:30 PM    Bacteria Negative 02/02/2023 03:30 PM    WBC 0-4 02/02/2023 03:30 PM    RBC 0-5 02/02/2023 03:30 PM       Notes reviewed from all clinical/nonclinical/nursing services involved in patient's clinical care. Care coordination discussions were held with appropriate clinical/nonclinical/ nursing providers based on care coordination needs. Patients current active Medications were reviewed, considered, added and adjusted based on the clinical condition today. Home Medications were reconciled to the best of my ability given all available resources at the time of admission. Route is PO if not otherwise noted.       Medications Reviewed:     Current Facility-Administered Medications   Medication Dose Route Frequency    buPROPion ER (ZYBAN,BUPROBAN) tablet 450 mg (Patient Supplied)  450 mg Oral DAILY    HYDROmorphone (DILAUDID) injection 3 mg  3 mg IntraVENous Q4H PRN    hydrOXYzine HCL (ATARAX) tablet 25 mg  25 mg Oral TID PRN    lipase-protease-amylase (ZENPEP 20,000) capsule 1 Capsule  1 Capsule Oral TID WITH MEALS    atorvastatin (LIPITOR) tablet 40 mg  40 mg Oral QHS    dicyclomine (BENTYL) tablet 20 mg  20 mg Oral P4M PRN    folic acid (FOLVITE) tablet 1 mg  1 mg Oral DAILY    levothyroxine (SYNTHROID) tablet 112 mcg  112 mcg Oral ACB    sertraline (ZOLOFT) tablet 50 mg  50 mg Oral DAILY    traZODone (DESYREL) tablet 100 mg  100 mg Oral QHS    sodium chloride (NS) flush 5-40 mL  5-40 mL IntraVENous Q8H    sodium chloride (NS) flush 5-40 mL  5-40 mL IntraVENous PRN    acetaminophen (TYLENOL) tablet 650 mg  650 mg Oral Q6H PRN    Or    acetaminophen (TYLENOL) suppository 650 mg  650 mg Rectal Q6H PRN    polyethylene glycol (MIRALAX) packet 17 g  17 g Oral DAILY PRN    ondansetron (ZOFRAN ODT) tablet 4 mg  4 mg Oral Q8H PRN    Or    ondansetron (ZOFRAN) injection 4 mg  4 mg IntraVENous Q6H PRN    enoxaparin (LOVENOX) injection 40 mg  40 mg SubCUTAneous DAILY    0.9% sodium chloride infusion  125 mL/hr IntraVENous CONTINUOUS    glucose chewable tablet 16 g  4 Tablet Oral PRN    glucagon (GLUCAGEN) injection 1 mg  1 mg IntraMUSCular PRN    dextrose 10 % infusion 0-250 mL  0-250 mL IntraVENous PRN    insulin lispro (HUMALOG) injection   SubCUTAneous Q6H    insulin pump (PATIENT SUPPLIED)   SubCUTAneous PRN    dextrose 10 % infusion 0-250 mL  0-250 mL IntraVENous PRN    gabapentin (NEURONTIN) capsule 600 mg  600 mg Oral QID    DULoxetine (CYMBALTA) capsule 60 mg  60 mg Oral DAILY     ______________________________________________________________________  EXPECTED LENGTH OF STAY: 2d 14h  ACTUAL LENGTH OF STAY:          3                 Arturo Hernández MD

## 2023-02-06 LAB
ALBUMIN SERPL-MCNC: 3.1 G/DL (ref 3.5–5)
ALBUMIN/GLOB SERPL: 1.1 (ref 1.1–2.2)
ALP SERPL-CCNC: 66 U/L (ref 45–117)
ALT SERPL-CCNC: 54 U/L (ref 12–78)
ANION GAP SERPL CALC-SCNC: 1 MMOL/L (ref 5–15)
AST SERPL-CCNC: 47 U/L (ref 15–37)
BILIRUB SERPL-MCNC: 0.6 MG/DL (ref 0.2–1)
BUN SERPL-MCNC: 8 MG/DL (ref 6–20)
BUN/CREAT SERPL: 11 (ref 12–20)
CALCIUM SERPL-MCNC: 8.3 MG/DL (ref 8.5–10.1)
CHLORIDE SERPL-SCNC: 106 MMOL/L (ref 97–108)
CO2 SERPL-SCNC: 33 MMOL/L (ref 21–32)
CREAT SERPL-MCNC: 0.74 MG/DL (ref 0.55–1.02)
ERYTHROCYTE [DISTWIDTH] IN BLOOD BY AUTOMATED COUNT: 14.4 % (ref 11.5–14.5)
GLOBULIN SER CALC-MCNC: 2.9 G/DL (ref 2–4)
GLUCOSE BLD STRIP.AUTO-MCNC: 123 MG/DL (ref 65–117)
GLUCOSE BLD STRIP.AUTO-MCNC: 124 MG/DL (ref 65–117)
GLUCOSE BLD STRIP.AUTO-MCNC: 254 MG/DL (ref 65–117)
GLUCOSE BLD STRIP.AUTO-MCNC: 258 MG/DL (ref 65–117)
GLUCOSE SERPL-MCNC: 143 MG/DL (ref 65–100)
HCT VFR BLD AUTO: 30.4 % (ref 35–47)
HGB BLD-MCNC: 9.7 G/DL (ref 11.5–16)
MAGNESIUM SERPL-MCNC: 2.1 MG/DL (ref 1.6–2.4)
MCH RBC QN AUTO: 29.8 PG (ref 26–34)
MCHC RBC AUTO-ENTMCNC: 31.9 G/DL (ref 30–36.5)
MCV RBC AUTO: 93.3 FL (ref 80–99)
NRBC # BLD: 0 K/UL (ref 0–0.01)
NRBC BLD-RTO: 0 PER 100 WBC
PLATELET # BLD AUTO: 350 K/UL (ref 150–400)
PMV BLD AUTO: 10.3 FL (ref 8.9–12.9)
POTASSIUM SERPL-SCNC: 3.9 MMOL/L (ref 3.5–5.1)
PROT SERPL-MCNC: 6 G/DL (ref 6.4–8.2)
RBC # BLD AUTO: 3.26 M/UL (ref 3.8–5.2)
SERVICE CMNT-IMP: ABNORMAL
SODIUM SERPL-SCNC: 140 MMOL/L (ref 136–145)
WBC # BLD AUTO: 11.3 K/UL (ref 3.6–11)

## 2023-02-06 PROCEDURE — 85027 COMPLETE CBC AUTOMATED: CPT

## 2023-02-06 PROCEDURE — 82962 GLUCOSE BLOOD TEST: CPT

## 2023-02-06 PROCEDURE — 74011250636 HC RX REV CODE- 250/636: Performed by: STUDENT IN AN ORGANIZED HEALTH CARE EDUCATION/TRAINING PROGRAM

## 2023-02-06 PROCEDURE — 74011636637 HC RX REV CODE- 636/637: Performed by: CLINICAL NURSE SPECIALIST

## 2023-02-06 PROCEDURE — 74011250637 HC RX REV CODE- 250/637: Performed by: HOSPITALIST

## 2023-02-06 PROCEDURE — 74011000258 HC RX REV CODE- 258: Performed by: HOSPITALIST

## 2023-02-06 PROCEDURE — 87040 BLOOD CULTURE FOR BACTERIA: CPT

## 2023-02-06 PROCEDURE — 99232 SBSQ HOSP IP/OBS MODERATE 35: CPT | Performed by: CLINICAL NURSE SPECIALIST

## 2023-02-06 PROCEDURE — 65270000046 HC RM TELEMETRY

## 2023-02-06 PROCEDURE — 83735 ASSAY OF MAGNESIUM: CPT

## 2023-02-06 PROCEDURE — 74011000250 HC RX REV CODE- 250: Performed by: STUDENT IN AN ORGANIZED HEALTH CARE EDUCATION/TRAINING PROGRAM

## 2023-02-06 PROCEDURE — 36415 COLL VENOUS BLD VENIPUNCTURE: CPT

## 2023-02-06 PROCEDURE — 74011250637 HC RX REV CODE- 250/637: Performed by: STUDENT IN AN ORGANIZED HEALTH CARE EDUCATION/TRAINING PROGRAM

## 2023-02-06 PROCEDURE — 74011250636 HC RX REV CODE- 250/636: Performed by: HOSPITALIST

## 2023-02-06 PROCEDURE — 80053 COMPREHEN METABOLIC PANEL: CPT

## 2023-02-06 RX ORDER — INSULIN LISPRO 100 [IU]/ML
INJECTION, SOLUTION INTRAVENOUS; SUBCUTANEOUS
Status: DISCONTINUED | OUTPATIENT
Start: 2023-02-06 | End: 2023-02-08 | Stop reason: HOSPADM

## 2023-02-06 RX ORDER — INSULIN LISPRO 100 [IU]/ML
3 INJECTION, SOLUTION INTRAVENOUS; SUBCUTANEOUS
Status: DISCONTINUED | OUTPATIENT
Start: 2023-02-06 | End: 2023-02-07

## 2023-02-06 RX ADMIN — Medication 5 UNITS: at 21:55

## 2023-02-06 RX ADMIN — FOLIC ACID 1 MG: 1 TABLET ORAL at 08:52

## 2023-02-06 RX ADMIN — PIPERACILLIN AND TAZOBACTAM 3.38 G: 3; .375 INJECTION, POWDER, FOR SOLUTION INTRAVENOUS at 21:55

## 2023-02-06 RX ADMIN — SODIUM CHLORIDE, PRESERVATIVE FREE 10 ML: 5 INJECTION INTRAVENOUS at 07:17

## 2023-02-06 RX ADMIN — GABAPENTIN 600 MG: 300 CAPSULE ORAL at 17:38

## 2023-02-06 RX ADMIN — PIPERACILLIN AND TAZOBACTAM 3.38 G: 3; .375 INJECTION, POWDER, FOR SOLUTION INTRAVENOUS at 07:16

## 2023-02-06 RX ADMIN — LEVOTHYROXINE SODIUM 112 MCG: 0.11 TABLET ORAL at 07:17

## 2023-02-06 RX ADMIN — SODIUM CHLORIDE 125 ML/HR: 9 INJECTION, SOLUTION INTRAVENOUS at 10:29

## 2023-02-06 RX ADMIN — SODIUM CHLORIDE, PRESERVATIVE FREE 10 ML: 5 INJECTION INTRAVENOUS at 21:58

## 2023-02-06 RX ADMIN — SODIUM CHLORIDE, PRESERVATIVE FREE 10 ML: 5 INJECTION INTRAVENOUS at 13:02

## 2023-02-06 RX ADMIN — Medication 3 UNITS: at 09:00

## 2023-02-06 RX ADMIN — GABAPENTIN 600 MG: 300 CAPSULE ORAL at 08:52

## 2023-02-06 RX ADMIN — Medication 3 UNITS: at 12:22

## 2023-02-06 RX ADMIN — PIPERACILLIN AND TAZOBACTAM 3.38 G: 3; .375 INJECTION, POWDER, FOR SOLUTION INTRAVENOUS at 13:01

## 2023-02-06 RX ADMIN — SODIUM CHLORIDE 125 ML/HR: 9 INJECTION, SOLUTION INTRAVENOUS at 18:48

## 2023-02-06 RX ADMIN — Medication 3 UNITS: at 12:23

## 2023-02-06 RX ADMIN — Medication 3 UNITS: at 21:54

## 2023-02-06 RX ADMIN — ENOXAPARIN SODIUM 40 MG: 100 INJECTION SUBCUTANEOUS at 08:52

## 2023-02-06 RX ADMIN — SODIUM CHLORIDE 125 ML/HR: 9 INJECTION, SOLUTION INTRAVENOUS at 00:51

## 2023-02-06 RX ADMIN — SERTRALINE HYDROCHLORIDE 50 MG: 50 TABLET ORAL at 08:52

## 2023-02-06 RX ADMIN — TRAZODONE HYDROCHLORIDE 100 MG: 100 TABLET ORAL at 21:55

## 2023-02-06 RX ADMIN — GABAPENTIN 600 MG: 300 CAPSULE ORAL at 21:55

## 2023-02-06 RX ADMIN — PANCRELIPASE LIPASE, PANCRELIPASE PROTEASE, PANCRELIPASE AMYLASE 1 CAPSULE: 20000; 63000; 84000 CAPSULE, DELAYED RELEASE ORAL at 12:24

## 2023-02-06 RX ADMIN — PANCRELIPASE LIPASE, PANCRELIPASE PROTEASE, PANCRELIPASE AMYLASE 1 CAPSULE: 20000; 63000; 84000 CAPSULE, DELAYED RELEASE ORAL at 08:50

## 2023-02-06 RX ADMIN — GABAPENTIN 600 MG: 300 CAPSULE ORAL at 12:25

## 2023-02-06 RX ADMIN — DULOXETINE HYDROCHLORIDE 60 MG: 30 CAPSULE, DELAYED RELEASE ORAL at 08:52

## 2023-02-06 RX ADMIN — ATORVASTATIN CALCIUM 40 MG: 40 TABLET, FILM COATED ORAL at 21:55

## 2023-02-06 RX ADMIN — Medication 5 UNITS: at 09:51

## 2023-02-06 RX ADMIN — Medication 3 UNITS: at 17:37

## 2023-02-06 RX ADMIN — PANCRELIPASE LIPASE, PANCRELIPASE PROTEASE, PANCRELIPASE AMYLASE 1 CAPSULE: 20000; 63000; 84000 CAPSULE, DELAYED RELEASE ORAL at 17:38

## 2023-02-06 RX ADMIN — BUPROPION HYDROCHLORIDE 450 MG: 150 TABLET, EXTENDED RELEASE ORAL at 09:00

## 2023-02-06 NOTE — CONSULTS
Patient examined and chart reviewed. Having episodes of visual hallucinations since Saturday, however, has had memory changes at baseline since islet cell transplant according to the patient. CT head showed no acute abnormality. Will hold on further imaging or EEG at this time. Patient has been off trazodone and Dilaudid since last evening. Neuro exam intact. Leukocytosis also noted which could be contributing although no source of infection but patient having intermittent hypoxia. Formal note to follow.

## 2023-02-06 NOTE — CONSULTS
NEUROLOGY CONSULT NOTE    Name Helen Zazueta Age 49 y.o. 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 At baseline, she is intermittently confused per the patient. She claims she knows she is confused because family tells her. She was noted 2 days ago to have some visual hallucinations and more confusion and hallucinations have intermittently been present since that time. Family became concerned and asked to have Neurology evaluation. CT head 23 was negative. Labs unremarkable except for leukocytosis peak at 14.5  Exam Neuro intact. Recommendations:   - possible related to leukocytosis with underlying infection of unknown source  - send ammonia, Vit B1, B12, TSH  - no indication for MRI or EEG at this time. - Continue to hold opioids and trazodone for sleep (patient did receive dose of trazodone this evening). Last dose dilaudid 23 11a. Neurology has no further recommendations. Please contact us if any changes. History of Present Illness: This is a 49 y.o. RH male with history of MDD with AYDIN, alcoholism (last use ), hypothyroidism, dyslipidemia, total pancreatectomy with auto islet cell transplant 2016 for refractory chronic pancreatitis, gastroparesis, partial SBO, splenectomy, cholecystectomy, type I DM, and HLD who presented to the ED on 2/3/23 with complaints of abdominal pain and N/V . She has had multiple admissions and ED visits for these symptoms and gastroparesis flares mostly presenting to [facility]. She has been followed by GI for gastroparesis at [facility]. She states that she has been told by family that she has been intermittently confused since surgery in 2016. On Saturday 2/4/23, she noted that she was having visual hallucinations and family became concerned. They asked for a consult today to due to what was reported as worsening mental status with hallucinations. No Known Allergies     Prior to Admission medications    Medication Sig Start Date End Date Taking? Authorizing Provider   LORazepam (ATIVAN) 0.5 mg tablet Take 0.5 mg by mouth two (2) times a day. Indications: anxious   Yes Provider, Historical   lubiPROStone (AMITIZA) 24 mcg capsule Take 24 mcg by mouth daily (with breakfast). Indications: chronic idiopathic constipation   Yes Provider, Historical   ciprofloxacin HCl (CIPRO) 500 mg tablet Take 500 mg by mouth daily as needed. Yes Provider, Historical   DULoxetine (CYMBALTA) 60 mg capsule Take 60 mg by mouth daily. Yes Provider, Historical   pantoprazole (Protonix) 40 mg tablet Take 40 mg by mouth two (2) times a day. Indications: indigestion   Yes Provider, Historical   prochlorperazine (Compazine) 10 mg tablet Take 1 Tablet by mouth every eight (8) hours as needed for Nausea. 9/15/21   ELISABETH Chandra   dicyclomine (BENTYL) 20 mg tablet Take 1 Tablet by mouth every six (6) hours as needed for Abdominal Cramps. 9/15/21   ELISABETH Chandra   ondansetron (ZOFRAN ODT) 4 mg disintegrating tablet Take 1 Tab by mouth every eight (8) hours as needed for Nausea. 7/16/19   Vita Ring PA   ondansetron (ZOFRAN ODT) 4 mg disintegrating tablet Take 1 Tab by mouth every eight (8) hours as needed for Nausea. 7/26/17   Jodi Guerrero DO   buPROPion SR [facility]) 150 mg SR tablet Take 450 mg by mouth daily. Indications: anxiousness associated with depression    Other, MD Kenzie   traZODone (DESYREL) 100 mg tablet Take 200 mg by mouth nightly.  1/2 to 1 every night    Kenzie Mora MD atorvastatin (LIPITOR) 40 mg tablet Take 40 mg by mouth nightly. Kenzie Mora MD   levothyroxine (SYNTHROID) 112 mcg tablet Take 112 mcg by mouth Daily (before breakfast). Kenzie Mora MD   ergocalciferol (VITAMIN D2) 50,000 unit capsule Take 50,000 Units by mouth every seven (7) days. Patient not taking: Reported on 7/10/2021    Kenzie Mora MD   insulin lispro (HUMALOG) 100 unit/mL injection 4 Units by SubCUTAneous route Before breakfast, lunch, and dinner. Kenzie Mora MD   gabapentin (NEURONTIN) 300 mg capsule Take 600 mg by mouth four (4) times daily. Indications: neuropathic pain    Kenzie Mora MD   sertraline (ZOLOFT) 50 mg tablet Take 50 mg by mouth daily. Provider, Siva   folic acid (FOLVITE) 1 mg tablet Take 1 Tab by mouth daily. 3/26/15   Eleanor Rios MD   amylase-lipase-protease (CREON) 24,000-76,000 -120,000 unit capsule Take 1 Cap by mouth three (3) times daily (with meals).  3/26/15   Eleanor Rios MD       Past Medical History:   Diagnosis Date    Anxiety     Depression     Diabetes ([facility] [address]     Hypercholesterolemia     Hypothyroid     Ill-defined condition     pancreatitis 2015    Liver disease     \"liver failure\" 2015    Long term (current) use of anticoagulants     SBO (small bowel obstruction) (HCC)     Thromboembolus (HCC)         Past Surgical History:   Procedure Laterality Date    HX CHOLECYSTECTOMY      HX HYSTERECTOMY      HX SPLENECTOMY      NV UNLISTED PROCEDURE ABDOMEN PERITONEUM & OMENTUM      Pt stated \"I have no pancreas\"         Social History     Tobacco Use    Smoking status: Former     Packs/day: 1.00     Types: Cigarettes    Smokeless tobacco: Never   Substance Use Topics    Alcohol use: No     Comment: pt 30 days clean        Family History   Problem Relation Age of Onset    Cancer Other     Hypertension Other     Cancer Mother     Cancer Maternal Aunt     Cancer Maternal Grandmother         Review of Systems:   Comprehensive review of systems performed and negative except for as listed above. Exam:     Visit Vitals  /71 (BP 1 Location: Right upper arm, BP Patient Position: At rest;Lying)   Pulse 85   Temp 98.3 °F (36.8 °C)   Resp 16   Ht 5' 2\" (1.575 m)   Wt 65.2 kg (143 lb 11.8 oz)   LMP 02/16/2015   SpO2 91%   BMI 26.29 kg/m²        General: Well developed, well nourished. Patient in no apparent distress   Head: Normocephalic, atraumatic, anicteric sclera   Lungs:  Clear to auscultation bilaterally, No wheezes or rubs   Cardiac: Regular rate and rhythm with no murmurs. Abd: Bowel sounds were audible. No tenderness on palpation   Ext: No pedal edema   Skin: No overt signs of rash     Neurological Exam:  Mental Status: A&Ox3   Speech: Fluent no aphasia or dysarthria. Cranial Nerves:   VFF. Facial sensation is normal. Facial movement is symmetric. Palate is midline. Normal sternocleidomastoid strength. Tongue is midline. Hearing is intact bilaterally. Eyes: PERRL, EOM's full, no nystagmus, no ptosis. Motor:  FC x4 5/5. Normal bulk and tone. Reflexes:   DTR 2+/4 and symmetrical.  Toes downgoing bilat. Sensory:   SILT bilat throughout   Gait:  Deferred   Tremor:   No tremor noted. Cerebellar:  FTN and HTS intact   Neurovascular: No carotid bruits. Imaging  CT Results (maximum last 3): Results from [place] encounter on 02/02/23    CT HEAD WO CONT    Narrative  EXAM: CT HEAD WO CONT    INDICATION: altered mental status    COMPARISON: March 16, 2017. CONTRAST: None. TECHNIQUE: Unenhanced CT of the head was performed using 5 mm images. Brain and  bone windows were generated. Coronal and sagittal reformats. CT dose reduction  was achieved through use of a standardized protocol tailored for this  examination and automatic exposure control for dose modulation. FINDINGS:  The ventricles and sulci are normal in size, shape and configuration. There is  no significant white matter disease.  There is no intracranial hemorrhage,  extra-axial collection, or mass effect. The basilar cisterns are open. No CT  evidence of acute infarct. The bone windows demonstrate no abnormalities. The visualized portions of the  paranasal sinuses and mastoid air cells are clear. Impression  No acute intracranial process. CT ABD PELV W CONT    Narrative  EXAM: CT ABD PELV W CONT    INDICATION: Rule out small bowel obstruction    COMPARISON: CT September 2021    CONTRAST: 100 mL of Isovue-370. ORAL CONTRAST: Not given    TECHNIQUE:  Following the uneventful intravenous administration of contrast, thin axial  images were obtained through the abdomen and pelvis. Coronal and sagittal  reconstructions were generated. CT dose reduction was achieved through use of a  standardized protocol tailored for this examination and automatic exposure  control for dose modulation. FINDINGS:  LOWER THORAX: No significant abnormality in the incidentally imaged lower chest.  Bilateral breast implants are partially visualized. LIVER: Chronic intrahepatic pneumobilia and diffuse fatty infiltration  BILIARY TREE: Status post cholecystectomy. Chronic pneumobilia. SPLEEN: Status post splenectomy  PANCREAS: Surgically absent  ADRENALS: Unremarkable. KIDNEYS: Benign right upper pole renal cyst. Otherwise normal  STOMACH: Status post antrectomy  SMALL BOWEL: No evidence of bowel obstruction or significant change  COLON: No dilatation or wall thickening. APPENDIX: Normal  PERITONEUM: No ascites or pneumoperitoneum. RETROPERITONEUM: No lymphadenopathy or aortic aneurysm. REPRODUCTIVE ORGANS: Status post hysterectomy  URINARY BLADDER: No mass or calculus. BONES: No destructive bone lesion. ABDOMINAL WALL: No mass or hernia. ADDITIONAL COMMENTS: N/A    Impression  1. No acute abdominal or pelvic pathology. 2. Extensive postsurgical changes, not significantly changed.       Results from [place] encounter on 09/14/21    CT ABD PELV W CONT    Narrative  EXAM: CT ABD PELV W CONT    INDICATION: left sided abd pain hx SBO, multiple abd surgeries    COMPARISON: July 10, 2021    CONTRAST: 100 mL of Isovue-370. TECHNIQUE:  Following the uneventful intravenous administration of contrast, thin axial  images were obtained through the abdomen and pelvis. Coronal and sagittal  reconstructions were generated. Oral contrast was not administered. CT dose  reduction was achieved through use of a standardized protocol tailored for this  examination and automatic exposure control for dose modulation. FINDINGS:  LOWER THORAX: Bilateral breast implants. LIVER: No mass. BILIARY TREE: Gallbladder is surgically absent. CBD is not dilated. Mobile a is unchanged. SPLEEN: Surgically absent. PANCREAS: Surgically absent. ADRENALS: Unremarkable. KIDNEYS: No mass, calculus, or hydronephrosis. Unchanged 2.2 cm right renal  cyst.  STOMACH: Status post antrectomy. SMALL BOWEL: No dilatation or wall thickening. COLON: No dilatation or wall thickening. APPENDIX: Normal.  PERITONEUM: No ascites or pneumoperitoneum. RETROPERITONEUM: No lymphadenopathy or aortic aneurysm. Incidentally noted  retroaortic left renal vein. REPRODUCTIVE ORGANS: Status post hysterectomy. URINARY BLADDER: No mass or calculus. BONES: No destructive bone lesion. ABDOMINAL WALL: No mass or hernia. ADDITIONAL COMMENTS: N/A    Impression  No evidence of acute abdominal or pelvic process. Stable postoperative changes  as detailed above. MRI Results (maximum last 3): No results found for this or any previous visit.       Lab Review  Lab Results   Component Value Date/Time    WBC 11.3 (H) 02/06/2023 03:59 AM    HCT 30.4 (L) 02/06/2023 03:59 AM    HGB 9.7 (L) 02/06/2023 03:59 AM    PLATELET 348 08/79/4853 03:59 AM     Lab Results   Component Value Date/Time    Sodium 140 02/06/2023 02:09 AM    Potassium 3.9 02/06/2023 02:09 AM    Chloride 106 02/06/2023 02:09 AM    CO2 33 (H) 02/06/2023 02:09 AM    Glucose 143 (H) 02/06/2023 02:09 AM    BUN 8 02/06/2023 02:09 AM    Creatinine 0.74 02/06/2023 02:09 AM    Calcium 8.3 (L) 02/06/2023 02:09 AM       Signed:  FAISAL Calles  2/6/2023  4:49 PM

## 2023-02-06 NOTE — PROGRESS NOTES
[address] [facility] Adult  Hospitalist Group                                                                                          Hospitalist Progress Note  Maty Cote MD  Answering service: [phone] from in house phone        Date of Service:  2023  NAME:  Sabrina Gastelum  :  1975  MRN:  [id number]       Admission Summary:     Sabrina Gastelum is a 49 y.o. 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 Endorses hx of recurrent episodes episodes of nausea requiring admission. Has also had some associated loose stools. Denies any associated melena or hematochezia. The patient denies any fever, chills, chest pain, ongoing nausea, vomiting, cough, congestion, recent illness, palpitations, or dysuria. Remarkable vitals on ER Presentation: vss  Labs Remarkable for: glu 300,   ER Images: ct abd et pelvis: no acute process  ER rx: morphine, zofran, 1L NS Bolus     Interval history / Subjective:     Patient is seen and examined. She is conscious and alert. Answer questions. Per report patient had episode of hallucination last night. Her sister worried that patient is mental status different from her base line      Assessment & Plan: Altered mental status,? Acute metabolic encephalopathy   -patient with episode of confusion and hallucination, possible due to medication,  -she had episode of less responsiveness, and shaking of extremities but awake during those episode on , had hallucination last night. Patient conscious and alert, oriented to place and person. Per her sister, Cain Ramos, report that patient doesn't remember things exactly.    -CT head no acute intracranial process  -continue neuro check and supportive care  -check ammonia level, patient denied alcohol use  -last dose of dilaudid on  around 11 am  -consult to neurologist     Intractable nausea and vomiting possible due to gastroparesis and pancreatic insufficiency  -advance diet as tolerated  -CT abdomen and pelvis no acute abdominal or pelvic pathology, extensive postsurgical change, no significant change  -Continue normal saline, Pepcid, pancreatic enzyme, prn bentyl, zofran,   -discontinue dilaudid   -GI is on board    SIRS possible ?  Aspiration pneumonia   -SpO2 86% on RA on 2/6, tachycardic  and spike fever  -SpO2 improved with oxygen 91-97% with nasal oxygen  -COVID19 Rapid test negative, respiratory panel negative  -chest x ray no acute process on chest x ray   -continue iv zosyn, IVF  -last fever was 100.8 2/6 2045  -monitor blood cx,      T2DM insulin dependent, s/p Islet cell transplant  -had episode of low blood sugar 70 on 6/5 morning, treated and improved  -A1c 8.1  -DTC consulted  and placed NPH 5 units q 12, lispro 5 units q ac, sliding scale and monitor finger stick glucose    Chronic pancreatic insufficiency  -Patient with intractable nausea and vomiting on presentation, improving now  -Continue pancreatic enzyme, IV fluid, as needed Zofran    Hx of MDD/AYDIN  -Stable, continue home Cymbalta, Zoloft and trazodone    Hx of HLD  -Continue home Lipitor       Code status: Full code  Prophylaxis: Lovenox  Care Plan discussed with: Patient, nurse and CM  Anticipated Disposition: Home with family, 24-48 hours  Inpatient  Cardiac monitoring: None     Hospital Problems  Date Reviewed: 5/4/2015            Codes Class Noted POA    Hyperglycemia ICD-10-CM: R73.9  ICD-9-CM: 790.29  2/2/2023 Unknown        Intractable nausea and vomiting ICD-10-CM: R11.2  ICD-9-CM: 536.2  2/2/2023 Unknown        DM I (diabetes mellitus, type I) ([facility] ICD-10-CM: E10.9  ICD-9-CM: 250.01  2/2/2023 Unknown         Social Determinants of Health     Tobacco Use: Medium Risk    Smoking Tobacco Use: Former    Smokeless Tobacco Use: Never    Passive Exposure: Not on file   Alcohol Use: Not on file   Financial Resource Strain: Not on file   Food Insecurity: Not on file   Transportation Needs: Not on file   Physical Activity: Not on file   Stress: Not on file   Social Connections: Not on file   Intimate Partner Violence: Not on file   Depression: Not on file   Housing Stability: Not on file       Vital Signs:    Last 24hrs VS reviewed since prior progress note. Most recent are:  Visit Vitals  /65 (BP 1 Location: Left upper arm, BP Patient Position: At rest)   Pulse 86   Temp 99.1 °F (37.3 °C)   Resp 16   Ht 5' 2\" (1.575 m)   Wt 65.2 kg (143 lb 11.8 oz)   SpO2 97%   BMI 26.29 kg/m²       No intake or output data in the 24 hours ending 02/06/23 1922       Physical Examination:     I had a face to face encounter with this patient and independently examined them on 2/6/2023 as outlined below:          Constitutional:  No acute distress, cooperative, pleasant    ENT:  Oral mucosa moist, oropharynx benign. Resp:  CTA bilaterally. No wheezing/rhonchi/rales. No accessory muscle use. CV:  Regular rhythm, normal rate, no murmurs, gallops, rubs    GI:  Soft, non distended, non tender. normoactive bowel sounds, no hepatosplenomegaly     Musculoskeletal:  No edema, warm, 2+ pulses throughout    Neurologic:  Conscious and alert, oriented to place and person, Moves all extremities.  CN II-XII reviewed            Data Review:    Review and/or order of clinical lab test  Review and/or order of tests in the radiology section of CPT  Review and/or order of tests in the medicine section of CPT    I have independently reviewed and interpreted patient's lab and all other diagnostic data    Labs:     Recent Labs     02/06/23  0359 02/05/23  0032   WBC 11.3* 8.2   HGB 9.7* 11.0*   HCT 30.4* 34.9*    415*       Recent Labs     02/06/23  0209 02/05/23  0032 02/04/23  0240    137 138   K 3.9 3.9 4.1    105 109*   CO2 33* 28 24   BUN 8 6 4*   CREA 0.74 0.87 0.67   * 147* 116*   CA 8.3* 8.6 8.8   MG 2.1  --   --        Recent Labs     02/06/23  0209 02/05/23  0032 02/04/23  0240   ALT 54 51 53   AP 66 75 69   TBILI 0.6 0.6 0.3   TP 6.0* 7.0 6.9   ALB 3.1* 3.7 3.7   GLOB 2.9 3.3 3.2       No results for input(s): INR, PTP, APTT, INREXT, INREXT in the last 72 hours. No results for input(s): FE, TIBC, PSAT, FERR in the last 72 hours. Lab Results   Component Value Date/Time    Folate 1.6 (L) 03/09/2015 04:15 AM        No results for input(s): PH, PCO2, PO2 in the last 72 hours. No results for input(s): CPK, CKNDX, TROIQ in the last 72 hours. No lab exists for component: CPKMB  Lab Results   Component Value Date/Time    Cholesterol, total 169 05/05/2015 04:12 AM    HDL Cholesterol 31 05/05/2015 04:12 AM    LDL, calculated 110.4 (H) 05/05/2015 04:12 AM    Triglyceride 138 05/05/2015 04:12 AM    CHOL/HDL Ratio 5.5 (H) 05/05/2015 04:12 AM     Lab Results   Component Value Date/Time    Glucose (POC) 124 (H) 02/06/2023 05:52 AM    Glucose (POC) 273 (H) 02/05/2023 11:22 PM    Glucose (POC) 252 (H) 02/05/2023 10:12 PM    Glucose (POC) 152 (H) 02/05/2023 04:11 PM    Glucose (POC) 114 02/05/2023 03:10 PM     Lab Results   Component Value Date/Time    Color YELLOW/STRAW 02/02/2023 03:30 PM    Appearance CLEAR 02/02/2023 03:30 PM    Specific gravity 1.010 02/02/2023 03:30 PM    Specific gravity 1.020 07/10/2021 02:19 AM    pH (UA) 5.5 02/02/2023 03:30 PM    Protein Negative 02/02/2023 03:30 PM    Glucose >1,000 (A) 02/02/2023 03:30 PM    Ketone Negative 02/02/2023 03:30 PM    Bilirubin Negative 02/02/2023 03:30 PM    Urobilinogen 0.2 02/02/2023 03:30 PM    Nitrites Negative 02/02/2023 03:30 PM    Leukocyte Esterase TRACE (A) 02/02/2023 03:30 PM    Epithelial cells FEW 02/02/2023 03:30 PM    Bacteria Negative 02/02/2023 03:30 PM    WBC 0-4 02/02/2023 03:30 PM    RBC 0-5 02/02/2023 03:30 PM       Notes reviewed from all clinical/nonclinical/nursing services involved in patient's clinical care. Care coordination discussions were held with appropriate clinical/nonclinical/ nursing providers based on care coordination needs. Patients current active Medications were reviewed, considered, added and adjusted based on the clinical condition today. Home Medications were reconciled to the best of my ability given all available resources at the time of admission. Route is PO if not otherwise noted.       Medications Reviewed:     Current Facility-Administered Medications   Medication Dose Route Frequency    HYDROmorphone (DILAUDID) injection 2 mg  2 mg IntraVENous Q4H PRN    piperacillin-tazobactam (ZOSYN) 3.375 g in 0.9% sodium chloride (MBP/ADV) 100 mL MBP  3.375 g IntraVENous Q8H    buPROPion ER (ZYBAN,BUPROBAN) tablet 450 mg (Patient Supplied)  450 mg Oral DAILY    hydrOXYzine HCL (ATARAX) tablet 25 mg  25 mg Oral TID PRN    lipase-protease-amylase (ZENPEP 20,000) capsule 1 Capsule  1 Capsule Oral TID WITH MEALS    atorvastatin (LIPITOR) tablet 40 mg  40 mg Oral QHS    dicyclomine (BENTYL) tablet 20 mg  20 mg Oral Q2R PRN    folic acid (FOLVITE) tablet 1 mg  1 mg Oral DAILY    levothyroxine (SYNTHROID) tablet 112 mcg  112 mcg Oral ACB    sertraline (ZOLOFT) tablet 50 mg  50 mg Oral DAILY    traZODone (DESYREL) tablet 100 mg  100 mg Oral QHS    sodium chloride (NS) flush 5-40 mL  5-40 mL IntraVENous Q8H    sodium chloride (NS) flush 5-40 mL  5-40 mL IntraVENous PRN    acetaminophen (TYLENOL) tablet 650 mg  650 mg Oral Q6H PRN    Or    acetaminophen (TYLENOL) suppository 650 mg  650 mg Rectal Q6H PRN    polyethylene glycol (MIRALAX) packet 17 g  17 g Oral DAILY PRN    ondansetron (ZOFRAN ODT) tablet 4 mg  4 mg Oral Q8H PRN    Or    ondansetron (ZOFRAN) injection 4 mg  4 mg IntraVENous Q6H PRN    enoxaparin (LOVENOX) injection 40 mg  40 mg SubCUTAneous DAILY    0.9% sodium chloride infusion  125 mL/hr IntraVENous CONTINUOUS    glucose chewable tablet 16 g  4 Tablet Oral PRN    glucagon (GLUCAGEN) injection 1 mg  1 mg IntraMUSCular PRN    dextrose 10 % infusion 0-250 mL  0-250 mL IntraVENous PRN    insulin lispro (HUMALOG) injection   SubCUTAneous Q6H    insulin pump (PATIENT SUPPLIED)   SubCUTAneous PRN    dextrose 10 % infusion 0-250 mL  0-250 mL IntraVENous PRN    gabapentin (NEURONTIN) capsule 600 mg  600 mg Oral QID    DULoxetine (CYMBALTA) capsule 60 mg  60 mg Oral DAILY     ______________________________________________________________________  EXPECTED LENGTH OF STAY: 2d 14h  ACTUAL LENGTH OF STAY:          4                 Jerson Roque MD

## 2023-02-06 NOTE — ROUTINE PROCESS
Bedside and Verbal shift change report given to Rashaad Fuller (oncoming nurse) by Aniyah Barbosa (offgoing nurse). Report included the following information SBAR, Kardex, Intake/Output, MAR, and Cardiac Rhythm SR/ST .

## 2023-02-06 NOTE — PROGRESS NOTES
TRANSFER - IN REPORT:    Verbal report received from Jose(name) on Yamini Byrd  being received from 6E(unit) for urgent transfer      Report consisted of patients Situation, Background, Assessment and   Recommendations(SBAR). Information from the following report(s) SBAR, Kardex, Procedure Summary, MAR, and Recent Results was reviewed with the receiving nurse. Opportunity for questions and clarification was provided. Assessment completed upon patients arrival to unit and care assumed.

## 2023-02-06 NOTE — PROGRESS NOTES
118 [facility].  217 [facility] 140 [facility], [address]   [phone]                     GI PROGRESS NOTE    Patient Name: Keira John      : 1975      MRN: [id number]  Admit Date: 2023  Today's Date: 2023  CC:     Subjective:     Seen on AM rounds. Doing \"ok\". Tolerated yogurt. No vomiting or diarrhea. Awaiting neuro consult for question of AMS. WBC slightly elevated. On zosyn. Objective:     Blood pressure 122/77, pulse 91, temperature 99 °F (37.2 °C), resp. rate 16, height 5' 2\" (1.575 m), weight 65.2 kg (143 lb 11.8 oz), last menstrual period 2015, SpO2 98 %. Physical Exam:  General appearance: cooperative, no distress, appears stated age  Skin: Extremities and face reveal no rashes. No roberts erythema. HEENT: Sclerae anicteric. Extra-occular muscles are intact. Cardiovascular: Regular rate and rhythm. No murmurs, gallops, or rubs. Respiratory: Comfortable breathing with no accessory muscle use. Clear breath sounds with no wheezes, rales, or rhonchi. GI: Abdomen nondistended, soft, and nontender. Normal active bowel sounds. No enlargement of the liver or spleen. No masses palpable. Rectal: Deferred   Musculoskeletal: No pitting edema of the lower legs. No costovertebral tenderness. Neurological: Gross memory appears intact. Patient is alert and oriented. Psychiatric: Mood appears appropriate with good judgement. No anxiety or agitation.       Data Review:    Recent Results (from the past 24 hour(s))   GLUCOSE, POC    Collection Time: 23  3:10 PM   Result Value Ref Range    Glucose (POC) 114 65 - 117 mg/dL    Performed by Lynn Stokes    GLUCOSE, POC    Collection Time: 23  4:11 PM   Result Value Ref Range    Glucose (POC) 152 (H) 65 - 117 mg/dL    Performed by Krystle Nidia    RESPIRATORY VIRUS PANEL W/COVID-19, PCR    Collection Time: 23  4:12 PM    Specimen: Nasopharyngeal   Result Value Ref Range    Adenovirus Not detected NOTD      Coronavirus 229E Not detected NOTD      Coronavirus HKU1 Not detected NOTD      Coronavirus CVNL63 Not detected NOTD      Coronavirus OC43 Not detected NOTD      SARS-CoV-2, PCR Not detected NOTD      Metapneumovirus Not detected NOTD      Rhinovirus and Enterovirus Not detected NOTD      Influenza A Not detected NOTD      Influenza B Not detected NOTD      Parainfluenza 1 Not detected NOTD      Parainfluenza 2 Not detected NOTD      Parainfluenza 3 Not detected NOTD      Parainfluenza virus 4 Not detected NOTD      RSV by PCR Not detected NOTD      B. parapertussis, PCR Not detected NOTD      Bordetella pertussis - PCR Not detected NOTD      Chlamydophila pneumoniae DNA, QL, PCR Not detected NOTD      Mycoplasma pneumoniae DNA, QL, PCR Not detected NOTD     GLUCOSE, POC    Collection Time: 02/05/23 10:12 PM   Result Value Ref Range    Glucose (POC) 252 (H) 65 - 117 mg/dL    Performed by Norma Perera  PCT    GLUCOSE, POC    Collection Time: 02/05/23 11:22 PM   Result Value Ref Range    Glucose (POC) 273 (H) 65 - 117 mg/dL    Performed by Mariya Lopez RN    METABOLIC PANEL, COMPREHENSIVE    Collection Time: 02/06/23  2:09 AM   Result Value Ref Range    Sodium 140 136 - 145 mmol/L    Potassium 3.9 3.5 - 5.1 mmol/L    Chloride 106 97 - 108 mmol/L    CO2 33 (H) 21 - 32 mmol/L    Anion gap 1 (L) 5 - 15 mmol/L    Glucose 143 (H) 65 - 100 mg/dL    BUN 8 6 - 20 MG/DL    Creatinine 0.74 0.55 - 1.02 MG/DL    BUN/Creatinine ratio 11 (L) 12 - 20      eGFR >60 >60 ml/min/1.73m2    Calcium 8.3 (L) 8.5 - 10.1 MG/DL    Bilirubin, total 0.6 0.2 - 1.0 MG/DL    ALT (SGPT) 54 12 - 78 U/L    AST (SGOT) 47 (H) 15 - 37 U/L    Alk.  phosphatase 66 45 - 117 U/L    Protein, total 6.0 (L) 6.4 - 8.2 g/dL    Albumin 3.1 (L) 3.5 - 5.0 g/dL    Globulin 2.9 2.0 - 4.0 g/dL    A-G Ratio 1.1 1.1 - 2.2     MAGNESIUM    Collection Time: 02/06/23  2:09 AM   Result Value Ref Range    Magnesium 2.1 1.6 - 2.4 mg/dL   CBC W/O DIFF Collection Time: 02/06/23  3:59 AM   Result Value Ref Range    WBC 11.3 (H) 3.6 - 11.0 K/uL    RBC 3.26 (L) 3.80 - 5.20 M/uL    HGB 9.7 (L) 11.5 - 16.0 g/dL    HCT 30.4 (L) 35.0 - 47.0 %    MCV 93.3 80.0 - 99.0 FL    MCH 29.8 26.0 - 34.0 PG    MCHC 31.9 30.0 - 36.5 g/dL    RDW 14.4 11.5 - 14.5 %    PLATELET 325 840 - 821 K/uL    MPV 10.3 8.9 - 12.9 FL    NRBC 0.0 0  WBC    ABSOLUTE NRBC 0.00 0.00 - 0.01 K/uL   GLUCOSE, POC    Collection Time: 02/06/23  5:52 AM   Result Value Ref Range    Glucose (POC) 124 (H) 65 - 117 mg/dL    Performed by LYNDON GRAJEDA, POC    Collection Time: 02/06/23 11:47 AM   Result Value Ref Range    Glucose (POC) 258 (H) 65 - 117 mg/dL    Performed by Tadeo Oneil / Plan :     Abdominal pain with nausea-likely related to gastroparesis and adhesions from the previous surgical procedures.   Continue conservative management   Diet as tolerated  Following     Patient Active Hospital Problem List:   Active Problems:    Hyperglycemia (2/2/2023)      Intractable nausea and vomiting (2/2/2023)      DM I (diabetes mellitus, type I) ([facility] (2/2/2023)        Time Spent with Patient: 15 minutes    James Anderson PA-C  02/06/23  3:06 PM

## 2023-02-06 NOTE — DIABETES MGMT
[address] [facility]  DIABETES MANAGEMENT CONSULT    Consulted by Provider for advanced nursing evaluation and care for inpatient blood glucose management. Evaluation and Action Plan   Shad Gonzalez is a 45yo female admitted with intractable N/V, abdominal pain associated with chronic gastroparesis/ and exocrine pancreatic insufficiency. 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 Her diabetes is managed by an insulin pump and Dexcom G6. Current A1C is pending, however, A1C from Sept. 2022, 8.1%. She endorses difficulty managing her diabetes with her gastroparesis and pancreatic insufficiency. Voices the timing of food with insulin and creon is mismatched based on how her body chooses to react to the food/creon/ and insulin. She does voice some scary lows and tries to ensure it does not happen , so her numbers are a little higher sometimes. While inpatient, she may continue to use her insulin pump and CGM. She is aware that we will still need to do POC glucose checks ACHS and use those values to make pump adjustments. Throughout the weekend patient had change in mental status, necessitating neurology consult/ CT scan head - WNL. Insulin pump pulled out by patient \"accidentally\" over weekend-  Since mental status change, insulin pump will remain off until cognitive status is back to baseline. Patient is aware and agreeable. Since she is T1D, she will require basal/bolus and correction insuiln regimen. Doses ordered are based off of insulin pump basal rate with 20% reduction. Bolus/pre meal insulin dose based off of carb ratio (1:13)/ placed on insulin sensitive correction.      Management Rationale Action Plan   Medication  START NPH 5 units q12h-START today  START pre meal bolus, 3 units TID humalog   CONTINUE ACHS correction scale, HIGH sensitivity  POC glucose checks ACHS       Diabetes Discharge Plan Medication  TBD   Referral  []        Outpatient diabetes education   Additional orders            Initial Presentation   Shad Gonzalez is a 49 y.o. female 2/2/23 admitted  after experiencing N/V, abdominal pain. Endorses hx of recurrent episodes episodes of nausea requiring admission. Has also had some associated loose stools. LAB:   CXR:  CT abd/pelvis: no acute process  MRI:    HX:   Past Medical History:   Diagnosis Date    Anxiety     Depression     Diabetes ([facility]     Hypercholesterolemia     Hypothyroid     Ill-defined condition     pancreatitis 2015    Liver disease     \"liver failure\" 2015    Long term (current) use of anticoagulants     SBO (small bowel obstruction) (HCC)     Thromboembolus (HCC)         INITIAL DX:   Intractable nausea and vomiting [R11.2]  Hyperglycemia [R73.9]  DM I (diabetes mellitus, type I) ([facility] [E10.9]     Current Treatment     TX: pain mgmt/ anti nausea medication/ IVF    Hospital Course   Clinical progress has been complicated by intractable N/V so far with unclear etiology. 2/6/23: noted changes in LOC/cognition over weekend prompting head CT and neuro consult- per hospitalist, could be due to Dilaudid-  pain med discontinued. Diabetes History   T2D--> Type 3 C diabetes, s/p total pancreatectomy 6yrs ago with islet cell transplant for chronic pancreatitis/ exocrine pancreatic insufficiency. Diabetes managed with insulin pump.     She is followed by Esa Ghosh MD with [facility],     Diabetes-related Medical History  Acute complications  hyperglycemia  Neurological complications  Gastroparesis and Peripheral neuropathy  Microvascular disease  none  Macrovascular disease  none  Other associated conditions     Depression/ hypothyroid    Diabetes Medication History: Uses Tandem insulin pump with Dexcom G6  Key Antihyperglycemic Medications               insulin lispro (HUMALOG) 100 unit/mL injection 4 Units by SubCUTAneous route Before breakfast, lunch, and dinner. Pump Settings: (Site changed 2/2/23 and pump reservoir re-filled)  Basal: 0.5u/hr  Carb Ratio: 1:13  Correction Factor: 1:75  Target BG\" 110    Diabetes self-management practices:   Eating pattern-hit and miss/    [x] Eating a carbohydrate-controlled meal plan    Physical activity pattern-   [x] Employing a physical activity program to control BG    Monitoring pattern-Dexcom G6 ;    [x] Testing BGs sufficiently to inform self-management adjustments    Taking medications pattern  [x] Consistent administration  [x] Affordable  Reducing risks  [] Influenza: There is no immunization history for the selected administration types on file for this patient. [] Pneumonia: There is no immunization history for the selected administration types on file for this patient. [] Hepatitis: There is no immunization history for the selected administration types on file for this patient. Social determinants of health impacting diabetes self-management practices   Struggling with anxiety and/or depression and Concerned that you need to know more about how to stay healthy with diabetes  Overall evaluation:    [x] Striving to achieving A1c target with drug therapy & self-care practices    Subjective   Patient awake and alert in bed. Oriented to self/location only. Has her days mixed up. Sister at bedside discussing events over weekend with hospitalist.  Discussed status of insulin pump - currently off patient. Folllowed by [facility] s/p pancreatectomy 6yrs ago      Objective   Physical exam  General Normal weight  female in no acute distress. Conversant and cooperative  Neuro  Alert, oriented to self/location only  Vital Signs Visit Vitals  /70 (BP 1 Location: Left upper arm, BP Patient Position: At rest;Lying)   Pulse 84   Temp 98.2 °F (36.8 °C)   Resp 16   Ht 5' 2\" (1.575 m)   Wt 65.2 kg (143 lb 11.8 oz)   SpO2 96%   BMI 26.29 kg/m²     Skin  Warm and dry.   Heart   Regular rate and rhythm.  No murmurs, rubs or gallops  Lungs  Clear to auscultation without rales or rhonchi  Extremities No foot wounds        Laboratory  Recent Labs     02/06/23  0359 02/06/23  0209 02/05/23  0032 02/04/23  0240   GLU  --  143* 147* 116*   AGAP  --  1* 4* 5   WBC 11.3*  --  8.2 14.5*   CREA  --  0.74 0.87 0.67   AST  --  47* 36 34   ALT  --  54 51 53         Factors impacting BG management  Factor Dose Comments   Nutrition:  Standard meals     60 grams/meal      Pain prn    Other:   Kidney function  Liver function     WNL  WNL      Blood glucose pattern    Significant diabetes-related events over the past 24-72 hours  Type 3c Diabetes (treated like T1D)   Hyperglycemia at admission now resolved  Tandem insulin pump in place with Dexcom CGM- DISCONTINUED for NOW  Basal: NPH 5 units q12h  Bolus: Lispro 3 unit TID w/ meals  Correction: HIGH sensitivity scale  Clear liquid diet--> advanced to regular/carb consistent    Assessment and Nursing Intervention   Nursing Diagnosis Risk for unstable blood glucose pattern   Nursing Intervention Domain 5250 Decision-making Support   Nursing Interventions Examined current inpatient diabetes/blood glucose control   Explored factors facilitating and impeding inpatient management  Explored corrective strategies with patient and responsible inpatient provider   Informed patient of rational for insulin strategy while hospitalized     Nursing Diagnosis 39654 Ineffective Health Management   Nursing Intervention Domain 5250 Decision-making Support   Nursing Interventions Identified diabetes self-management practices impeding diabetes control  Discussed diabetes survival skills related to  Healthy Plate eating plan; given handouts  Role of physical activity in improving insulin sensitivity and action  Procedure for blood glucose monitoring & options for low-cost products  Medications plan at discharge     Billing Code(s)   76328    Before making these care recommendations, I personally reviewed the hospitalization record, including notes, laboratory & diagnostic data and current medications, and examined the patient at the bedside (circumstances permitting) before determining care. More than fifty (50) percent of the time was spent in patient counseling and/or care coordination.   Total minutes: [facility]  Diabetes Clinical Nurse Specialist  [facility]  Access via [facility]

## 2023-02-07 LAB
ALBUMIN SERPL-MCNC: 3.1 G/DL (ref 3.5–5)
ALBUMIN/GLOB SERPL: 1.1 (ref 1.1–2.2)
ALP SERPL-CCNC: 66 U/L (ref 45–117)
ALT SERPL-CCNC: 48 U/L (ref 12–78)
ANION GAP SERPL CALC-SCNC: 3 MMOL/L (ref 5–15)
AST SERPL-CCNC: 35 U/L (ref 15–37)
BILIRUB SERPL-MCNC: 0.5 MG/DL (ref 0.2–1)
BUN SERPL-MCNC: 7 MG/DL (ref 6–20)
BUN/CREAT SERPL: 13 (ref 12–20)
CALCIUM SERPL-MCNC: 8.6 MG/DL (ref 8.5–10.1)
CHLORIDE SERPL-SCNC: 107 MMOL/L (ref 97–108)
CO2 SERPL-SCNC: 32 MMOL/L (ref 21–32)
CREAT SERPL-MCNC: 0.56 MG/DL (ref 0.55–1.02)
GLOBULIN SER CALC-MCNC: 2.9 G/DL (ref 2–4)
GLUCOSE BLD STRIP.AUTO-MCNC: 138 MG/DL (ref 65–117)
GLUCOSE BLD STRIP.AUTO-MCNC: 249 MG/DL (ref 65–117)
GLUCOSE BLD STRIP.AUTO-MCNC: 286 MG/DL (ref 65–117)
GLUCOSE BLD STRIP.AUTO-MCNC: 330 MG/DL (ref 65–117)
GLUCOSE SERPL-MCNC: 154 MG/DL (ref 65–100)
MAGNESIUM SERPL-MCNC: 1.9 MG/DL (ref 1.6–2.4)
PHOSPHATE SERPL-MCNC: 2.6 MG/DL (ref 2.6–4.7)
POTASSIUM SERPL-SCNC: 4.1 MMOL/L (ref 3.5–5.1)
PROT SERPL-MCNC: 6 G/DL (ref 6.4–8.2)
SARS-COV-2 RDRP RESP QL NAA+PROBE: NOT DETECTED
SERVICE CMNT-IMP: ABNORMAL
SODIUM SERPL-SCNC: 142 MMOL/L (ref 136–145)
SOURCE, COVRS: NORMAL
TSH SERPL DL<=0.05 MIU/L-ACNC: 0.54 UIU/ML (ref 0.36–3.74)
VIT B12 SERPL-MCNC: >2000 PG/ML (ref 193–986)

## 2023-02-07 PROCEDURE — 84443 ASSAY THYROID STIM HORMONE: CPT

## 2023-02-07 PROCEDURE — 84100 ASSAY OF PHOSPHORUS: CPT

## 2023-02-07 PROCEDURE — 82962 GLUCOSE BLOOD TEST: CPT

## 2023-02-07 PROCEDURE — 65270000046 HC RM TELEMETRY

## 2023-02-07 PROCEDURE — 36415 COLL VENOUS BLD VENIPUNCTURE: CPT

## 2023-02-07 PROCEDURE — 74011000250 HC RX REV CODE- 250: Performed by: STUDENT IN AN ORGANIZED HEALTH CARE EDUCATION/TRAINING PROGRAM

## 2023-02-07 PROCEDURE — 74011250636 HC RX REV CODE- 250/636: Performed by: STUDENT IN AN ORGANIZED HEALTH CARE EDUCATION/TRAINING PROGRAM

## 2023-02-07 PROCEDURE — 80053 COMPREHEN METABOLIC PANEL: CPT

## 2023-02-07 PROCEDURE — 74011250636 HC RX REV CODE- 250/636: Performed by: HOSPITALIST

## 2023-02-07 PROCEDURE — 99222 1ST HOSP IP/OBS MODERATE 55: CPT | Performed by: NURSE PRACTITIONER

## 2023-02-07 PROCEDURE — 82607 VITAMIN B-12: CPT

## 2023-02-07 PROCEDURE — 74011000258 HC RX REV CODE- 258: Performed by: HOSPITALIST

## 2023-02-07 PROCEDURE — 87635 SARS-COV-2 COVID-19 AMP PRB: CPT

## 2023-02-07 PROCEDURE — 74011636637 HC RX REV CODE- 636/637: Performed by: CLINICAL NURSE SPECIALIST

## 2023-02-07 PROCEDURE — 84425 ASSAY OF VITAMIN B-1: CPT

## 2023-02-07 PROCEDURE — 74011250637 HC RX REV CODE- 250/637: Performed by: STUDENT IN AN ORGANIZED HEALTH CARE EDUCATION/TRAINING PROGRAM

## 2023-02-07 PROCEDURE — 83735 ASSAY OF MAGNESIUM: CPT

## 2023-02-07 PROCEDURE — 74011250637 HC RX REV CODE- 250/637: Performed by: HOSPITALIST

## 2023-02-07 RX ORDER — INSULIN LISPRO 100 [IU]/ML
4 INJECTION, SOLUTION INTRAVENOUS; SUBCUTANEOUS
Status: DISCONTINUED | OUTPATIENT
Start: 2023-02-07 | End: 2023-02-08 | Stop reason: HOSPADM

## 2023-02-07 RX ADMIN — ATORVASTATIN CALCIUM 40 MG: 40 TABLET, FILM COATED ORAL at 21:24

## 2023-02-07 RX ADMIN — GABAPENTIN 600 MG: 300 CAPSULE ORAL at 21:24

## 2023-02-07 RX ADMIN — ENOXAPARIN SODIUM 40 MG: 100 INJECTION SUBCUTANEOUS at 08:55

## 2023-02-07 RX ADMIN — PANCRELIPASE LIPASE, PANCRELIPASE PROTEASE, PANCRELIPASE AMYLASE 1 CAPSULE: 20000; 63000; 84000 CAPSULE, DELAYED RELEASE ORAL at 12:52

## 2023-02-07 RX ADMIN — SODIUM CHLORIDE, PRESERVATIVE FREE 10 ML: 5 INJECTION INTRAVENOUS at 21:24

## 2023-02-07 RX ADMIN — Medication 4 UNITS: at 17:58

## 2023-02-07 RX ADMIN — SODIUM CHLORIDE, PRESERVATIVE FREE 10 ML: 5 INJECTION INTRAVENOUS at 14:47

## 2023-02-07 RX ADMIN — DULOXETINE HYDROCHLORIDE 60 MG: 30 CAPSULE, DELAYED RELEASE ORAL at 08:56

## 2023-02-07 RX ADMIN — PANCRELIPASE LIPASE, PANCRELIPASE PROTEASE, PANCRELIPASE AMYLASE 1 CAPSULE: 20000; 63000; 84000 CAPSULE, DELAYED RELEASE ORAL at 09:02

## 2023-02-07 RX ADMIN — FOLIC ACID 1 MG: 1 TABLET ORAL at 08:56

## 2023-02-07 RX ADMIN — SERTRALINE HYDROCHLORIDE 50 MG: 50 TABLET ORAL at 08:56

## 2023-02-07 RX ADMIN — Medication 5 UNITS: at 21:23

## 2023-02-07 RX ADMIN — PIPERACILLIN AND TAZOBACTAM 3.38 G: 3; .375 INJECTION, POWDER, FOR SOLUTION INTRAVENOUS at 21:24

## 2023-02-07 RX ADMIN — Medication 2 UNITS: at 11:30

## 2023-02-07 RX ADMIN — BUPROPION HYDROCHLORIDE 450 MG: 150 TABLET, EXTENDED RELEASE ORAL at 09:01

## 2023-02-07 RX ADMIN — Medication 4 UNITS: at 12:52

## 2023-02-07 RX ADMIN — Medication 2 UNITS: at 21:23

## 2023-02-07 RX ADMIN — GABAPENTIN 600 MG: 300 CAPSULE ORAL at 12:52

## 2023-02-07 RX ADMIN — Medication 3 UNITS: at 17:57

## 2023-02-07 RX ADMIN — Medication 5 UNITS: at 08:56

## 2023-02-07 RX ADMIN — PIPERACILLIN AND TAZOBACTAM 3.38 G: 3; .375 INJECTION, POWDER, FOR SOLUTION INTRAVENOUS at 14:47

## 2023-02-07 RX ADMIN — TRAZODONE HYDROCHLORIDE 100 MG: 100 TABLET ORAL at 21:24

## 2023-02-07 RX ADMIN — LEVOTHYROXINE SODIUM 112 MCG: 0.11 TABLET ORAL at 06:00

## 2023-02-07 RX ADMIN — PIPERACILLIN AND TAZOBACTAM 3.38 G: 3; .375 INJECTION, POWDER, FOR SOLUTION INTRAVENOUS at 06:00

## 2023-02-07 RX ADMIN — PANCRELIPASE LIPASE, PANCRELIPASE PROTEASE, PANCRELIPASE AMYLASE 1 CAPSULE: 20000; 63000; 84000 CAPSULE, DELAYED RELEASE ORAL at 17:40

## 2023-02-07 RX ADMIN — SODIUM CHLORIDE, PRESERVATIVE FREE 10 ML: 5 INJECTION INTRAVENOUS at 06:00

## 2023-02-07 RX ADMIN — GABAPENTIN 600 MG: 300 CAPSULE ORAL at 17:57

## 2023-02-07 RX ADMIN — GABAPENTIN 600 MG: 300 CAPSULE ORAL at 08:56

## 2023-02-07 NOTE — DIABETES MGMT
[facility]  DIABETES MANAGEMENT CONSULT    Consulted by Provider for advanced nursing evaluation and care for inpatient blood glucose management. Evaluation and Action Plan   Laura Ospina is a 47yo female admitted with intractable N/V, abdominal pain associated with chronic gastroparesis/ and exocrine pancreatic insufficiency. 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 Her diabetes is managed by an insulin pump and Dexcom G6. Current A1C is pending, however, A1C from Sept. 2022, 8.1%. She endorses difficulty managing her diabetes with her gastroparesis and pancreatic insufficiency. Voices the timing of food with insulin and creon is mismatched based on how her body chooses to react to the food/creon/ and insulin. She does voice some scary lows and tries to ensure it does not happen , so her numbers are a little higher sometimes. Throughout the weekend patient had change in mental status, necessitating neurology consult/ CT scan head - WNL. Insulin pump pulled out by patient \"accidentally\" over weekend-  Since mental status change, insulin pump will remain off until cognitive status is back to baseline. Patient is aware and agreeable. Since she is T1D, she will require basal/bolus and correction insuiln regimen. Doses ordered are based off of insulin pump basal rate with 20% reduction. Bolus/pre meal insulin dose based off of carb ratio (1:13)/ placed on insulin sensitive correction.      Management Rationale Action Plan   Medication  START NPH 5 units q12h-  ADVANCED  pre meal bolus, 4 units TID humalog   CONTINUE ACHS correction scale, HIGH sensitivity  POC glucose checks ACHS       Diabetes Discharge Plan   Medication  TBD   Referral  []        Outpatient diabetes education   Additional orders            Initial Presentation   Laura Ospina is a 49 y.o. female 2/2/23 admitted  after experiencing N/V, abdominal pain. Endorses hx of recurrent episodes episodes of nausea requiring admission. Has also had some associated loose stools. LAB:   CXR:  CT abd/pelvis: no acute process  MRI:    HX:   Past Medical History:   Diagnosis Date    Anxiety     Depression     Diabetes ([facility]     Hypercholesterolemia     Hypothyroid     Ill-defined condition     pancreatitis 2015    Liver disease     \"liver failure\" 2015    Long term (current) use of anticoagulants     SBO (small bowel obstruction) (HCC)     Thromboembolus (HCC)         INITIAL DX:   Intractable nausea and vomiting [R11.2]  Hyperglycemia [R73.9]  DM I (diabetes mellitus, type I) ([facility] [E10.9]     Current Treatment     TX: pain mgmt/ anti nausea medication/ IVF    Hospital Course   Clinical progress has been complicated by intractable N/V so far with unclear etiology. 2/6/23: noted changes in LOC/cognition over weekend prompting head CT and neuro consult- per hospitalist, could be due to Dilaudid-  pain med discontinued. 2/7: no acute events, neuro status back to baseline. Rapid covid negative thus far. Diabetes History   T2D--> Type 3 C diabetes, s/p total pancreatectomy 6yrs ago with islet cell transplant for chronic pancreatitis/ exocrine pancreatic insufficiency. Diabetes managed with insulin pump. She is followed by Hardie Severin ,MD with [facility],     Diabetes-related Medical History  Acute complications  hyperglycemia  Neurological complications  Gastroparesis and Peripheral neuropathy  Microvascular disease  none  Macrovascular disease  none  Other associated conditions     Depression/ hypothyroid    Diabetes Medication History: Uses Tandem insulin pump with Dexcom G6  Key Antihyperglycemic Medications               insulin lispro (HUMALOG) 100 unit/mL injection 4 Units by SubCUTAneous route Before breakfast, lunch, and dinner.          Pump Settings: (Site changed 2/2/23 and pump reservoir re-filled)  Basal: 0.5u/hr  Carb Ratio: 1:13  Correction Factor: 1:75  Target BG\" 110    Diabetes self-management practices:   Eating pattern-hit and miss/    [x] Eating a carbohydrate-controlled meal plan    Physical activity pattern-   [x] Employing a physical activity program to control BG    Monitoring pattern-Dexcom G6 ;    [x] Testing BGs sufficiently to inform self-management adjustments    Taking medications pattern  [x] Consistent administration  [x] Affordable  Reducing risks  [] Influenza: There is no immunization history for the selected administration types on file for this patient. [] Pneumonia: There is no immunization history for the selected administration types on file for this patient. [] Hepatitis: There is no immunization history for the selected administration types on file for this patient. Social determinants of health impacting diabetes self-management practices   Struggling with anxiety and/or depression and Concerned that you need to know more about how to stay healthy with diabetes  Overall evaluation:    [x] Striving to achieving A1c target with drug therapy & self-care practices    Subjective   \" I am feeling better\"      Folllowed by [facility] s/p pancreatectomy 6yrs ago      Objective   Physical exam  General Normal weight  female in no acute distress. Conversant and cooperative  Neuro  Alert, oriented to self/location only  Vital Signs Visit Vitals  /81 (BP 1 Location: Left upper arm, BP Patient Position: At rest;Lying)   Pulse 86   Temp 99 °F (37.2 °C)   Resp 18   Ht 5' 2\" (1.575 m)   Wt 65.2 kg (143 lb 11.8 oz)   SpO2 94%   BMI 26.29 kg/m²     Skin  Warm and dry. Heart   Regular rate and rhythm.  No murmurs, rubs or gallops  Lungs  Clear to auscultation without rales or rhonchi  Extremities No foot wounds        Laboratory  Recent Labs     02/07/23  0231 02/06/23  0359 02/06/23  0209 02/05/23  0032   *  --  143* 147*   AGAP 3*  --  1* 4*   WBC  --  11.3*  --  8.2   CREA 0.56  --  0.74 0.87   AST 35  --  47* 36   ALT 48  --  54 51         Factors impacting BG management  Factor Dose Comments   Nutrition:  Standard meals     60 grams/meal      Pain prn    Other:   Kidney function  Liver function     WNL  WNL      Blood glucose pattern    Significant diabetes-related events over the past 24-72 hours  Type 3c Diabetes (treated like T1D)   Hyperglycemia at admission now resolved  Tandem insulin pump in place with Dexcom CGM- DISCONTINUED for NOW  Basal: NPH 5 units q12h  Bolus: Lispro 3 unit TID w/ meals-->advanced to 4 units TID  Correction: HIGH sensitivity scale  Clear liquid diet--> advanced to regular/carb consistent    Assessment and Nursing Intervention   Nursing Diagnosis Risk for unstable blood glucose pattern   Nursing Intervention Domain 5250 Decision-making Support   Nursing Interventions Examined current inpatient diabetes/blood glucose control   Explored factors facilitating and impeding inpatient management  Explored corrective strategies with patient and responsible inpatient provider   Informed patient of rational for insulin strategy while hospitalized     Nursing Diagnosis 77635 Ineffective Health Management   Nursing Intervention Domain 5250 Decision-making Support   Nursing Interventions Identified diabetes self-management practices impeding diabetes control  Discussed diabetes survival skills related to  Healthy Plate eating plan; given handouts  Role of physical activity in improving insulin sensitivity and action  Procedure for blood glucose monitoring & options for low-cost products  Medications plan at discharge     Billing Code(s)   No  charge    Before making these care recommendations, I personally reviewed the hospitalization record, including notes, laboratory & diagnostic data and current medications, and examined the patient at the bedside (circumstances permitting) before determining care.  More than fifty (50) percent of the time was spent in patient counseling and/or care coordination.   Total minutes: <15min    DADA Layne  Diabetes Clinical Nurse Specialist  [facility]  Access via [address] [place] [address]

## 2023-02-07 NOTE — PROGRESS NOTES
Problem: General Medical Care Plan  Goal: *Vital signs within specified parameters  Outcome: Progressing Towards Goal  Note:   Monitor for change in patient condition (eg: Altered mental status; monitor vital signs; diaphoresis; dizziness; pallor; dyspnea;   palpitations)  Goal: *Labs within defined limits  Outcome: Progressing Towards Goal

## 2023-02-07 NOTE — PROGRESS NOTES
Transition of Care Plan  RUR- 7% Moderate Risk  DISPOSITION: The disposition plan is home with family assistance. F/U with PCP/Specialist    Transport: Family - Sister vs Spouse    CM completed chart review, per previous CM, patient admitted from home. CM to assist with SARAH needs as they arise.     LING Bosch, LTSS, LMHP-e  Available in [employer]

## 2023-02-07 NOTE — ROUTINE PROCESS
End of Shift Note    Bedside shift change report given to Rach(oncoming nurse) by Lora Gaxiola RN (offgoing nurse).   Report included the following information SBAR, Kardex, Procedure Summary, Intake/Output, MAR, and Recent Results    Shift worked:  0730 - 0800     Shift summary and any significant changes:     No changes overnight     Concerns for physician to address:  Vit B12 result - > 2000 this morning     Zone phone for oncoming shift:   [phone]       Activity:  Activity Level: Up ad radha  Number times ambulated in hallways past shift: 0  Number of times OOB to chair past shift: 0    Cardiac:   Cardiac Monitoring: Yes      Cardiac Rhythm: Sinus Rhythm    Access:  Current line(s): PIV     Genitourinary:   Urinary status: voiding    Respiratory:   O2 Device: None (Room air)  Chronic home O2 use?: NO  Incentive spirometer at bedside: NO       GI:  Last Bowel Movement Date: 02/06/23  Current diet:  ADULT DIET Regular; 4 carb choices (60 gm/meal)  Passing flatus: YES  Tolerating current diet: YES       Pain Management:   Patient states pain is manageable on current regimen: YES    Skin:  Wisam Score: 21  Interventions: increase time out of bed and nutritional support     Patient Safety:  Fall Score:    Interventions: gripper socks       Length of Stay:  Expected LOS: 3d 19h  Actual LOS: Denia Dorantes RN

## 2023-02-07 NOTE — PROGRESS NOTES
[address] [facility]  Hospitalist Group                                                                                          Hospitalist Progress Note  Lucina Singh MD  Answering service: [phone] from in house phone        Date of Service:  2023  NAME:  Helen Zazueta  :  1975  MRN:  [id number]       Admission Summary:     Helen Zazueta is a 49 y.o. 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 Endorses hx of recurrent episodes episodes of nausea requiring admission. Has also had some associated loose stools. Denies any associated melena or hematochezia. The patient denies any fever, chills, chest pain, ongoing nausea, vomiting, cough, congestion, recent illness, palpitations, or dysuria. Remarkable vitals on ER Presentation: vss  Labs Remarkable for: glu 300,   ER Images: ct abd et pelvis: no acute process  ER rx: morphine, zofran, 1L NS Bolus     Interval history / Subjective:     Patient is seen and examined. She states that she feels better. No fever, cough, chills or difficulty of breathing. She said her sister who visited her yesterday tested positive for COVID 19 and wants to be tested. Assessment & Plan: Altered mental status due to acute metabolic encephalopathy   -confusion and hallucination improving   -had episode of less responsiveness, and shaking of extremities but awake during those episode on , had hallucination last .    -dilaudid has discontinued on   -CT head no acute intracranial process  -continue neuro check and supportive care  -seen by neurology     Intractable nausea and vomiting possible due to gastroparesis and pancreatic insufficiency  -nausea and vomiting improved  -advanced diet to regular diet and tolerated.    -CT abdomen and pelvis no acute abdominal or pelvic pathology, extensive postsurgical change, no significant change  -Continue normal saline, Pepcid, pancreatic enzyme, prn bentyl, zofran,   -discontinue dilaudid   -GI is on board    SIRS possible ?  Aspiration pneumonia   -SpO2 86% on RA on 2/6, tachycardic  and spike fever  -SpO2 improved with oxygen 91-97% with nasal oxygen  -COVID19 Rapid test negative, respiratory panel negative  -chest x ray no acute process on chest x ray   -continue iv zosyn, IVF  -last fever was 100.8 2/6 2045  -monitor blood cx,      T2DM insulin dependent, s/p Islet cell transplant  -had episode of low blood sugar 70 on 6/5 morning, treated and improved  -A1c 8.1  -DTC consulted  and placed NPH 5 units q 12, lispro 5 units q ac, sliding scale and monitor finger stick glucose    Chronic pancreatic insufficiency  -Patient with intractable nausea and vomiting on presentation, improving now  -Continue pancreatic enzyme, IV fluid, as needed Zofran    Hx of MDD/AYDIN  -Stable, continue home Cymbalta, Zoloft and trazodone    Hx of HLD  -Continue home Lipitor       Code status: Full code  Prophylaxis: Lovenox  Care Plan discussed with: Patient, nurse and CM  Anticipated Disposition: Home with family, 24-48 hours  Inpatient  Cardiac monitoring: None     Hospital Problems  Date Reviewed: 5/4/2015            Codes Class Noted POA    Hyperglycemia ICD-10-CM: R73.9  ICD-9-CM: 790.29  2/2/2023 Unknown        Intractable nausea and vomiting ICD-10-CM: R11.2  ICD-9-CM: 536.2  2/2/2023 Unknown        DM I (diabetes mellitus, type I) ([facility] [address] ICD-10-CM: E10.9  ICD-9-CM: 250.01  2/2/2023 Unknown         Social Determinants of Health     Tobacco Use: Medium Risk    Smoking Tobacco Use: Former    Smokeless Tobacco Use: Never    Passive Exposure: Not on file   Alcohol Use: Not on file   Financial Resource Strain: Not on file   Food Insecurity: Not on file   Transportation Needs: Not on file   Physical Activity: Not on file   Stress: Not on file   Social Connections: Not on file   Intimate Partner Violence: Not on file   Depression: Not on file   Housing Stability: Not on file       Vital Signs:    Last 24hrs VS reviewed since prior progress note. Most recent are:  Visit Vitals  BP (!) 147/78 (BP 1 Location: Left upper arm, BP Patient Position: At rest;Lying)   Pulse 76   Temp 99.1 °F (37.3 °C)   Resp 18   Ht 5' 2\" (1.575 m)   Wt 65.2 kg (143 lb 11.8 oz)   SpO2 95%   BMI 26.29 kg/m²         Intake/Output Summary (Last 24 hours) at 2/7/2023 1443  Last data filed at 2/6/2023 1800  Gross per 24 hour   Intake 07360.08 ml   Output --   Net 37248.08 ml          Physical Examination:     I had a face to face encounter with this patient and independently examined them on 2/7/2023 as outlined below:          Constitutional:  No acute distress, cooperative, pleasant    ENT:  Oral mucosa moist, oropharynx benign. Resp:  CTA bilaterally. No wheezing/rhonchi/rales. No accessory muscle use. CV:  Regular rhythm, normal rate, no murmurs, gallops, rubs    GI:  Soft, non distended, non tender. normoactive bowel sounds, no hepatosplenomegaly     Musculoskeletal:  No edema, warm, 2+ pulses throughout    Neurologic:  Conscious and alert, oriented to place and person, Moves all extremities.  CN II-XII reviewed            Data Review:    Review and/or order of clinical lab test  Review and/or order of tests in the radiology section of CPT  Review and/or order of tests in the medicine section of CPT    I have independently reviewed and interpreted patient's lab and all other diagnostic data    Labs:     Recent Labs     02/06/23  0359 02/05/23  0032   WBC 11.3* 8.2   HGB 9.7* 11.0*   HCT 30.4* 34.9*    415*       Recent Labs     02/07/23  0231 02/06/23  0209 02/05/23  0032    140 137   K 4.1 3.9 3.9    106 105   CO2 32 33* 28   BUN 7 8 6   CREA 0.56 0.74 0.87   * 143* 147*   CA 8.6 8.3* 8.6   MG 1.9 2.1  --    PHOS 2.6  --   --        Recent Labs 02/07/23  0231 02/06/23  0209 02/05/23  0032   ALT 48 54 51   AP 66 66 75   TBILI 0.5 0.6 0.6   TP 6.0* 6.0* 7.0   ALB 3.1* 3.1* 3.7   GLOB 2.9 2.9 3.3       No results for input(s): INR, PTP, APTT, INREXT, INREXT in the last 72 hours. No results for input(s): FE, TIBC, PSAT, FERR in the last 72 hours. Lab Results   Component Value Date/Time    Folate 1.6 (L) 03/09/2015 04:15 AM        No results for input(s): PH, PCO2, PO2 in the last 72 hours. No results for input(s): CPK, CKNDX, TROIQ in the last 72 hours. No lab exists for component: CPKMB  Lab Results   Component Value Date/Time    Cholesterol, total 169 05/05/2015 04:12 AM    HDL Cholesterol 31 05/05/2015 04:12 AM    LDL, calculated 110.4 (H) 05/05/2015 04:12 AM    Triglyceride 138 05/05/2015 04:12 AM    CHOL/HDL Ratio 5.5 (H) 05/05/2015 04:12 AM     Lab Results   Component Value Date/Time    Glucose (POC) 249 (H) 02/07/2023 12:44 PM    Glucose (POC) 138 (H) 02/07/2023 06:02 AM    Glucose (POC) 254 (H) 02/06/2023 08:26 PM    Glucose (POC) 123 (H) 02/06/2023 04:48 PM    Glucose (POC) 258 (H) 02/06/2023 11:47 AM     Lab Results   Component Value Date/Time    Color YELLOW/STRAW 02/02/2023 03:30 PM    Appearance CLEAR 02/02/2023 03:30 PM    Specific gravity 1.010 02/02/2023 03:30 PM    Specific gravity 1.020 07/10/2021 02:19 AM    pH (UA) 5.5 02/02/2023 03:30 PM    Protein Negative 02/02/2023 03:30 PM    Glucose >1,000 (A) 02/02/2023 03:30 PM    Ketone Negative 02/02/2023 03:30 PM    Bilirubin Negative 02/02/2023 03:30 PM    Urobilinogen 0.2 02/02/2023 03:30 PM    Nitrites Negative 02/02/2023 03:30 PM    Leukocyte Esterase TRACE (A) 02/02/2023 03:30 PM    Epithelial cells FEW 02/02/2023 03:30 PM    Bacteria Negative 02/02/2023 03:30 PM    WBC 0-4 02/02/2023 03:30 PM    RBC 0-5 02/02/2023 03:30 PM       Notes reviewed from all clinical/nonclinical/nursing services involved in patient's clinical care.  Care coordination discussions were held with appropriate clinical/nonclinical/ nursing providers based on care coordination needs. Patients current active Medications were reviewed, considered, added and adjusted based on the clinical condition today. Home Medications were reconciled to the best of my ability given all available resources at the time of admission. Route is PO if not otherwise noted.       Medications Reviewed:     Current Facility-Administered Medications   Medication Dose Route Frequency    insulin lispro (HUMALOG) injection 4 Units  4 Units SubCUTAneous TIDAC    dextrose 10 % infusion 0-250 mL  0-250 mL IntraVENous PRN    insulin NPH (NOVOLIN N, HUMULIN N) injection 5 Units  5 Units SubCUTAneous Q12H    insulin lispro (HUMALOG) injection   SubCUTAneous AC&HS    piperacillin-tazobactam (ZOSYN) 3.375 g in 0.9% sodium chloride (MBP/ADV) 100 mL MBP  3.375 g IntraVENous Q8H    buPROPion ER (ZYBAN,BUPROBAN) tablet 450 mg (Patient Supplied)  450 mg Oral DAILY    lipase-protease-amylase (ZENPEP 20,000) capsule 1 Capsule  1 Capsule Oral TID WITH MEALS    atorvastatin (LIPITOR) tablet 40 mg  40 mg Oral QHS    dicyclomine (BENTYL) tablet 20 mg  20 mg Oral A4B PRN    folic acid (FOLVITE) tablet 1 mg  1 mg Oral DAILY    levothyroxine (SYNTHROID) tablet 112 mcg  112 mcg Oral ACB    sertraline (ZOLOFT) tablet 50 mg  50 mg Oral DAILY    traZODone (DESYREL) tablet 100 mg  100 mg Oral QHS    sodium chloride (NS) flush 5-40 mL  5-40 mL IntraVENous Q8H    sodium chloride (NS) flush 5-40 mL  5-40 mL IntraVENous PRN    acetaminophen (TYLENOL) tablet 650 mg  650 mg Oral Q6H PRN    Or    acetaminophen (TYLENOL) suppository 650 mg  650 mg Rectal Q6H PRN    polyethylene glycol (MIRALAX) packet 17 g  17 g Oral DAILY PRN    ondansetron (ZOFRAN ODT) tablet 4 mg  4 mg Oral Q8H PRN    Or    ondansetron (ZOFRAN) injection 4 mg  4 mg IntraVENous Q6H PRN    enoxaparin (LOVENOX) injection 40 mg  40 mg SubCUTAneous DAILY    0.9% sodium chloride infusion  125 mL/hr IntraVENous CONTINUOUS    glucose chewable tablet 16 g  4 Tablet Oral PRN    glucagon (GLUCAGEN) injection 1 mg  1 mg IntraMUSCular PRN    dextrose 10 % infusion 0-250 mL  0-250 mL IntraVENous PRN    dextrose 10 % infusion 0-250 mL  0-250 mL IntraVENous PRN    gabapentin (NEURONTIN) capsule 600 mg  600 mg Oral QID    DULoxetine (CYMBALTA) capsule 60 mg  60 mg Oral DAILY     ______________________________________________________________________  EXPECTED LENGTH OF STAY: 3d 19h  ACTUAL LENGTH OF STAY:          5                 Lucina Singh MD

## 2023-02-08 VITALS
HEIGHT: 62 IN | TEMPERATURE: 98 F | WEIGHT: 143.74 LBS | SYSTOLIC BLOOD PRESSURE: 124 MMHG | BODY MASS INDEX: 26.45 KG/M2 | RESPIRATION RATE: 17 BRPM | HEART RATE: 67 BPM | OXYGEN SATURATION: 94 % | DIASTOLIC BLOOD PRESSURE: 78 MMHG

## 2023-02-08 LAB
ALBUMIN SERPL-MCNC: 2.9 G/DL (ref 3.5–5)
ALBUMIN/GLOB SERPL: 1 (ref 1.1–2.2)
ALP SERPL-CCNC: 66 U/L (ref 45–117)
ALT SERPL-CCNC: 40 U/L (ref 12–78)
ANION GAP SERPL CALC-SCNC: 5 MMOL/L (ref 5–15)
AST SERPL-CCNC: 19 U/L (ref 15–37)
BILIRUB SERPL-MCNC: 0.6 MG/DL (ref 0.2–1)
BUN SERPL-MCNC: 3 MG/DL (ref 6–20)
BUN/CREAT SERPL: 5 (ref 12–20)
CALCIUM SERPL-MCNC: 8.9 MG/DL (ref 8.5–10.1)
CHLORIDE SERPL-SCNC: 109 MMOL/L (ref 97–108)
CO2 SERPL-SCNC: 28 MMOL/L (ref 21–32)
CREAT SERPL-MCNC: 0.62 MG/DL (ref 0.55–1.02)
ERYTHROCYTE [DISTWIDTH] IN BLOOD BY AUTOMATED COUNT: 14.5 % (ref 11.5–14.5)
GLOBULIN SER CALC-MCNC: 2.8 G/DL (ref 2–4)
GLUCOSE BLD STRIP.AUTO-MCNC: 281 MG/DL (ref 65–117)
GLUCOSE SERPL-MCNC: 250 MG/DL (ref 65–100)
HCT VFR BLD AUTO: 33.7 % (ref 35–47)
HGB BLD-MCNC: 11.2 G/DL (ref 11.5–16)
MCH RBC QN AUTO: 30.3 PG (ref 26–34)
MCHC RBC AUTO-ENTMCNC: 33.2 G/DL (ref 30–36.5)
MCV RBC AUTO: 91.1 FL (ref 80–99)
NRBC # BLD: 0 K/UL (ref 0–0.01)
NRBC BLD-RTO: 0 PER 100 WBC
PLATELET # BLD AUTO: 435 K/UL (ref 150–400)
PMV BLD AUTO: 10.7 FL (ref 8.9–12.9)
POTASSIUM SERPL-SCNC: 4.1 MMOL/L (ref 3.5–5.1)
PROT SERPL-MCNC: 5.7 G/DL (ref 6.4–8.2)
RBC # BLD AUTO: 3.7 M/UL (ref 3.8–5.2)
SERVICE CMNT-IMP: ABNORMAL
SODIUM SERPL-SCNC: 142 MMOL/L (ref 136–145)
WBC # BLD AUTO: 9.2 K/UL (ref 3.6–11)

## 2023-02-08 PROCEDURE — 74011250637 HC RX REV CODE- 250/637: Performed by: STUDENT IN AN ORGANIZED HEALTH CARE EDUCATION/TRAINING PROGRAM

## 2023-02-08 PROCEDURE — 74011250636 HC RX REV CODE- 250/636: Performed by: HOSPITALIST

## 2023-02-08 PROCEDURE — 74011250637 HC RX REV CODE- 250/637: Performed by: HOSPITALIST

## 2023-02-08 PROCEDURE — 85027 COMPLETE CBC AUTOMATED: CPT

## 2023-02-08 PROCEDURE — 74011000250 HC RX REV CODE- 250: Performed by: STUDENT IN AN ORGANIZED HEALTH CARE EDUCATION/TRAINING PROGRAM

## 2023-02-08 PROCEDURE — 36415 COLL VENOUS BLD VENIPUNCTURE: CPT

## 2023-02-08 PROCEDURE — 82962 GLUCOSE BLOOD TEST: CPT

## 2023-02-08 PROCEDURE — 74011000258 HC RX REV CODE- 258: Performed by: HOSPITALIST

## 2023-02-08 PROCEDURE — 74011636637 HC RX REV CODE- 636/637: Performed by: CLINICAL NURSE SPECIALIST

## 2023-02-08 PROCEDURE — 80053 COMPREHEN METABOLIC PANEL: CPT

## 2023-02-08 RX ORDER — AMOXICILLIN AND CLAVULANATE POTASSIUM 875; 125 MG/1; MG/1
1 TABLET, FILM COATED ORAL 2 TIMES DAILY
Qty: 10 TABLET | Refills: 0 | Status: SHIPPED | OUTPATIENT
Start: 2023-02-08 | End: 2023-02-13

## 2023-02-08 RX ADMIN — BUPROPION HYDROCHLORIDE 450 MG: 150 TABLET, EXTENDED RELEASE ORAL at 08:25

## 2023-02-08 RX ADMIN — PANCRELIPASE LIPASE, PANCRELIPASE PROTEASE, PANCRELIPASE AMYLASE 1 CAPSULE: 20000; 63000; 84000 CAPSULE, DELAYED RELEASE ORAL at 08:25

## 2023-02-08 RX ADMIN — GABAPENTIN 600 MG: 300 CAPSULE ORAL at 08:24

## 2023-02-08 RX ADMIN — DULOXETINE HYDROCHLORIDE 60 MG: 30 CAPSULE, DELAYED RELEASE ORAL at 08:25

## 2023-02-08 RX ADMIN — Medication 4 UNITS: at 08:13

## 2023-02-08 RX ADMIN — FOLIC ACID 1 MG: 1 TABLET ORAL at 08:25

## 2023-02-08 RX ADMIN — PIPERACILLIN AND TAZOBACTAM 3.38 G: 3; .375 INJECTION, POWDER, FOR SOLUTION INTRAVENOUS at 06:46

## 2023-02-08 RX ADMIN — SODIUM CHLORIDE, PRESERVATIVE FREE 10 ML: 5 INJECTION INTRAVENOUS at 06:00

## 2023-02-08 RX ADMIN — SERTRALINE HYDROCHLORIDE 50 MG: 50 TABLET ORAL at 08:16

## 2023-02-08 RX ADMIN — LEVOTHYROXINE SODIUM 112 MCG: 0.11 TABLET ORAL at 06:46

## 2023-02-08 RX ADMIN — SODIUM CHLORIDE 100 ML/HR: 9 INJECTION, SOLUTION INTRAVENOUS at 06:46

## 2023-02-08 NOTE — PROGRESS NOTES
[address] [facility] Adult  Hospitalist Group                                                                                          Hospitalist Progress Note  Shena Scanlon MD  Answering service: [phone] from in house phone        Date of Service:  2023  NAME:  Chapito Ernst  :  1975  MRN:  [id number]       Admission Summary:     Chapito Ernst is a 49 y.o. 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 Endorses hx of recurrent episodes episodes of nausea requiring admission. Has also had some associated loose stools. Denies any associated melena or hematochezia. The patient denies any fever, chills, chest pain, ongoing nausea, vomiting, cough, congestion, recent illness, palpitations, or dysuria. Remarkable vitals on ER Presentation: vss  Labs Remarkable for: glu 300,   ER Images: ct abd et pelvis: no acute process  ER rx: morphine, zofran, 1L NS Bolus     Interval history / Subjective:     Patient is seen and examined. She states that she feels better. No fever, cough, chills or difficulty of breathing. Discussed about discharge plan and agreed. Assessment & Plan: Altered mental status due to acute metabolic encephalopathy   -confusion and hallucination resolved  -had episode of less responsiveness, and shaking of extremities but awake during those episode on , had hallucination last .    -dilaudid has discontinued on   -CT head no acute intracranial process  -continue neuro check and supportive care  -seen by neurology     Intractable nausea and vomiting possible due to gastroparesis and pancreatic insufficiency  -nausea and vomiting resolved  -advanced diet to regular diet and tolerated.    -CT abdomen and pelvis no acute abdominal or pelvic pathology, extensive postsurgical change, no significant change  -Continue normal saline, Pepcid, pancreatic enzyme, prn bentyl, zofran,   -discontinue dilaudid   -GI is on board    SIRS unclear source  -Aspiration pneumonia ruled out after study  -SpO2 86% on RA on 2/6, tachycardic  and spike fever  -SpO2 improved with oxygen 91-97% with nasal oxygen  -COVID19 Rapid test negative, respiratory panel negative  -chest x ray no acute process on chest x ray   -continue iv zosyn, IVF, switch to Augmentin on discharge with floraQ  -last fever was 100.8 2/6 2045  -monitor blood cx,      T2DM insulin dependent, s/p Islet cell transplant  -had episode of low blood sugar 70 on 6/5 morning, treated and improved  -A1c 8.1  -DTC consulted  and placed NPH 5 units q 12, lispro 5 units q ac, sliding scale and monitor finger stick glucose  -She said she wants to continue her insulin pump on discharge    Chronic pancreatic insufficiency  -Patient with intractable nausea and vomiting on presentation, improving now  -Continue pancreatic enzyme, discontinue IV fluid, on as needed Zofran    Hx of MDD/AYDIN  -Stable, continue home Cymbalta, Zoloft and trazodone    Hx of HLD  -Continue home Lipitor       Code status: Full code  Prophylaxis: Lovenox  Care Plan discussed with: Patient, nurse and CM  Anticipated Disposition: Home with family, 24 hours  Inpatient  Cardiac monitoring: None     Hospital Problems  Date Reviewed: 5/4/2015            Codes Class Noted POA    Hyperglycemia ICD-10-CM: R73.9  ICD-9-CM: 790.29  2/2/2023 Unknown        Intractable nausea and vomiting ICD-10-CM: R11.2  ICD-9-CM: 536.2  2/2/2023 Unknown        DM I (diabetes mellitus, type I) ([facility] [address] ICD-10-CM: E10.9  ICD-9-CM: 250.01  2/2/2023 Unknown         Social Determinants of Health     Tobacco Use: Medium Risk    Smoking Tobacco Use: Former    Smokeless Tobacco Use: Never    Passive Exposure: Not on file   Alcohol Use: Not on file   Financial Resource Strain: Not on file   Food Insecurity: Not on file Transportation Needs: Not on file   Physical Activity: Not on file   Stress: Not on file   Social Connections: Not on file   Intimate Partner Violence: Not on file   Depression: Not on file   Housing Stability: Not on file       Vital Signs:    Last 24hrs VS reviewed since prior progress note. Most recent are:  Visit Vitals  /78   Pulse 67   Temp 98 °F (36.7 °C)   Resp 17   Ht 5' 2\" (1.575 m)   Wt 65.2 kg (143 lb 11.8 oz)   SpO2 94%   BMI 26.29 kg/m²       No intake or output data in the 24 hours ending 02/08/23 2308       Physical Examination:     I had a face to face encounter with this patient and independently examined them on 2/8/2023 as outlined below:          Constitutional:  No acute distress, cooperative, pleasant    ENT:  Oral mucosa moist, oropharynx benign. Resp:  CTA bilaterally. No wheezing/rhonchi/rales. No accessory muscle use. CV:  Regular rhythm, normal rate, no murmurs, gallops, rubs    GI:  Soft, non distended, non tender. normoactive bowel sounds, no hepatosplenomegaly     Musculoskeletal:  No edema, warm, 2+ pulses throughout    Neurologic:  Conscious and alert, oriented to place and person, Moves all extremities.  CN II-XII reviewed            Data Review:    Review and/or order of clinical lab test  Review and/or order of tests in the radiology section of CPT  Review and/or order of tests in the medicine section of CPT    I have independently reviewed and interpreted patient's lab and all other diagnostic data    Labs:     Recent Labs     02/08/23 0312 02/06/23  0359   WBC 9.2 11.3*   HGB 11.2* 9.7*   HCT 33.7* 30.4*   * 350       Recent Labs     02/08/23 0312 02/07/23  0231 02/06/23  0209    142 140   K 4.1 4.1 3.9   * 107 106   CO2 28 32 33*   BUN 3* 7 8   CREA 0.62 0.56 0.74   * 154* 143*   CA 8.9 8.6 8.3*   MG  --  1.9 2.1   PHOS  --  2.6  --        Recent Labs     02/08/23 0312 02/07/23  0231 02/06/23  0209   ALT 40 48 54   AP 66 66 66   TBILI 0.6 0.5 0.6   TP 5.7* 6.0* 6.0*   ALB 2.9* 3.1* 3.1*   GLOB 2.8 2.9 2.9       No results for input(s): INR, PTP, APTT, INREXT, INREXT in the last 72 hours. No results for input(s): FE, TIBC, PSAT, FERR in the last 72 hours. Lab Results   Component Value Date/Time    Folate 1.6 (L) 03/09/2015 04:15 AM        No results for input(s): PH, PCO2, PO2 in the last 72 hours. No results for input(s): CPK, CKNDX, TROIQ in the last 72 hours. No lab exists for component: CPKMB  Lab Results   Component Value Date/Time    Cholesterol, total 169 05/05/2015 04:12 AM    HDL Cholesterol 31 05/05/2015 04:12 AM    LDL, calculated 110.4 (H) 05/05/2015 04:12 AM    Triglyceride 138 05/05/2015 04:12 AM    CHOL/HDL Ratio 5.5 (H) 05/05/2015 04:12 AM     Lab Results   Component Value Date/Time    Glucose (POC) 281 (H) 02/08/2023 06:52 AM    Glucose (POC) 330 (H) 02/07/2023 09:15 PM    Glucose (POC) 286 (H) 02/07/2023 05:43 PM    Glucose (POC) 249 (H) 02/07/2023 12:44 PM    Glucose (POC) 138 (H) 02/07/2023 06:02 AM     Lab Results   Component Value Date/Time    Color YELLOW/STRAW 02/02/2023 03:30 PM    Appearance CLEAR 02/02/2023 03:30 PM    Specific gravity 1.010 02/02/2023 03:30 PM    Specific gravity 1.020 07/10/2021 02:19 AM    pH (UA) 5.5 02/02/2023 03:30 PM    Protein Negative 02/02/2023 03:30 PM    Glucose >1,000 (A) 02/02/2023 03:30 PM    Ketone Negative 02/02/2023 03:30 PM    Bilirubin Negative 02/02/2023 03:30 PM    Urobilinogen 0.2 02/02/2023 03:30 PM    Nitrites Negative 02/02/2023 03:30 PM    Leukocyte Esterase TRACE (A) 02/02/2023 03:30 PM    Epithelial cells FEW 02/02/2023 03:30 PM    Bacteria Negative 02/02/2023 03:30 PM    WBC 0-4 02/02/2023 03:30 PM    RBC 0-5 02/02/2023 03:30 PM       Notes reviewed from all clinical/nonclinical/nursing services involved in patient's clinical care. Care coordination discussions were held with appropriate clinical/nonclinical/ nursing providers based on care coordination needs. Patients current active Medications were reviewed, considered, added and adjusted based on the clinical condition today. Home Medications were reconciled to the best of my ability given all available resources at the time of admission. Route is PO if not otherwise noted.       Medications Reviewed:     Current Facility-Administered Medications   Medication Dose Route Frequency    insulin lispro (HUMALOG) injection 4 Units  4 Units SubCUTAneous TIDAC    dextrose 10 % infusion 0-250 mL  0-250 mL IntraVENous PRN    insulin NPH (NOVOLIN N, HUMULIN N) injection 5 Units  5 Units SubCUTAneous Q12H    insulin lispro (HUMALOG) injection   SubCUTAneous AC&HS    piperacillin-tazobactam (ZOSYN) 3.375 g in 0.9% sodium chloride (MBP/ADV) 100 mL MBP  3.375 g IntraVENous Q8H    buPROPion ER (ZYBAN,BUPROBAN) tablet 450 mg (Patient Supplied)  450 mg Oral DAILY    lipase-protease-amylase (ZENPEP 20,000) capsule 1 Capsule  1 Capsule Oral TID WITH MEALS    atorvastatin (LIPITOR) tablet 40 mg  40 mg Oral QHS    dicyclomine (BENTYL) tablet 20 mg  20 mg Oral V9G PRN    folic acid (FOLVITE) tablet 1 mg  1 mg Oral DAILY    levothyroxine (SYNTHROID) tablet 112 mcg  112 mcg Oral ACB    sertraline (ZOLOFT) tablet 50 mg  50 mg Oral DAILY    traZODone (DESYREL) tablet 100 mg  100 mg Oral QHS    sodium chloride (NS) flush 5-40 mL  5-40 mL IntraVENous Q8H    sodium chloride (NS) flush 5-40 mL  5-40 mL IntraVENous PRN    acetaminophen (TYLENOL) tablet 650 mg  650 mg Oral Q6H PRN    Or    acetaminophen (TYLENOL) suppository 650 mg  650 mg Rectal Q6H PRN    polyethylene glycol (MIRALAX) packet 17 g  17 g Oral DAILY PRN    ondansetron (ZOFRAN ODT) tablet 4 mg  4 mg Oral Q8H PRN    Or    ondansetron (ZOFRAN) injection 4 mg  4 mg IntraVENous Q6H PRN    enoxaparin (LOVENOX) injection 40 mg  40 mg SubCUTAneous DAILY    0.9% sodium chloride infusion  100 mL/hr IntraVENous CONTINUOUS    glucose chewable tablet 16 g  4 Tablet Oral PRN    glucagon (GLUCAGEN) injection 1 mg  1 mg IntraMUSCular PRN    dextrose 10 % infusion 0-250 mL  0-250 mL IntraVENous PRN    dextrose 10 % infusion 0-250 mL  0-250 mL IntraVENous PRN    gabapentin (NEURONTIN) capsule 600 mg  600 mg Oral QID    DULoxetine (CYMBALTA) capsule 60 mg  60 mg Oral DAILY     ______________________________________________________________________  EXPECTED LENGTH OF STAY: 3d 19h  ACTUAL LENGTH OF STAY:          6                 Kieran Thakkar MD

## 2023-02-08 NOTE — PROGRESS NOTES
Physician Progress Note      PATIENT:               Loren Nolan  CSN #:                  [id number]  :                       1975  ADMIT DATE:       2023 3:17 PM  [address] [place] DATE:        2023 10:02 AM  RESPONDING  PROVIDER #:        Yolanda Patel MD          QUERY TEXT:    Pt was noted to have confusion and hallucinations. Hospitalist has documented altered mental status due to metabolic encephalopathy. Neurology noted symptoms possibly related to leukocytosis with underlying infection of unknown source and recommended holding opioids and trazodone for sleep. Hospitalist noted possible SIRS with unclear etiology and hypoxia to 86 percent on RA. Hospitalist also noted stopping Dilaudid, placing pt on NC O2 and placing the pt on IV Abx. Hospitalist subsequently documented that confusion and hallucinations resolved during admission. If possible, please clarify the specificity/etiology of the encephalopathy:    The medical record reflects the following:    Risk Factors: 49 y.o. female with PMH of MDD w/ AYDIN, hypothyroidism, dyslipidemia, pancreatic insufficiency s/p pancreatectomy, IDDM II s/p isclet cell transplant, who was admitted for intractable nausea and vomiting, confusion and hallucinations    Clinical Indicators:    -- Hospitalist documented:  \"Altered mental status due to acute metabolic encephalopathy  -confusion and hallucination resolved -had episode of less responsiveness, and shaking of extremities but awake during those episode on , had hallucination last . -dilaudid has discontinued on  -CT head no acute intracranial process\"& \"SIRS unclear source -Aspiration pneumonia ruled out after study -SpO2 86% on RA on , tachycardic  and spike fever -SpO2 improved with oxygen 91-97% with nasal oxygen -COVID19 Rapid test negative, respiratory panel negative -chest x ray no acute process on chest x ray -continue iv zosyn, IVF, switch to Augmentin on discharge with floraQ -last fever was 100.8 2/6 2045\"    -- Neurology documented: \"She claims she knows she is confused because family tells her. She was noted 2 days ago to have some visual hallucinations and more confusion and hallucinations have intermittently been present since that time. Family became concerned and asked to have Neurology evaluation. CT head 2/5/23 was negative. Labs unremarkable except for leukocytosis peak at 14.5. Exam Neuro intact. Recommendations: - possible related to leukocytosis with underlying infection of unknown source - send ammonia, Vit B1, B12, TSH - no indication for MRI or EEG at this time. - Continue to hold opioids and trazodone for sleep (patient did receive dose of trazodone this evening). Last dose dilaudid 2/5/23 11a. \"    Treatment: Neurology consult, IV Abx (Zosyn) & switch to Augmentin on discharge with floraQ, stopped Dilaudid, imaging study, serial labs & vitals monitoring, pulse oximetry monitoring, supplemental O2    Thank-you,  Austin Macario, KELLY, [facility]  Clinical   Andrew Reeves. [email or web address]  [phone]  You can also contact me via [address] [place] [address]. Options provided:  -- Metabolic encephalopathy due to underlying infection of unknown source and hypoxia  -- Toxic encephalopathy due to opioids  -- Toxic metabolic encephalopathy due to opioids, underlying infection of unknown source and hypoxia  -- Metabolic encephalopathy due to other, please specify, Please specify metabolic etiology for encephalopathy. -- Toxic metabolic encephalopathy due to other, please specify, Please specify toxic and metabolic etiologies for encephalopathy. -- Other - I will add my own diagnosis  -- Disagree - Not applicable / Not valid  -- Disagree - Clinically unable to determine / Unknown  -- Refer to Clinical Documentation Reviewer    PROVIDER RESPONSE TEXT:    This patient had metabolic encephalopathy due to underlying infection of unknown source and hypoxia.     Query created by: Hao Floyd on 2/8/2023 10:50 AM      Electronically signed by:  Yolanda Patel MD 2/8/2023 11:24 AM

## 2023-02-08 NOTE — PROGRESS NOTES
End of Shift Note    Bedside shift change report given to  (oncoming nurse) by Tiara Bray RN (offgoing nurse). Report included the following information SBAR, Kardex, Procedure Summary, Intake/Output, MAR, and Recent Results    Shift worked:  0730 - 0800     Shift summary and any significant changes:     No changes     Concerns for physician to address:  No concerns     Zone phone for oncoming shift:        Activity:  Activity Level: Up ad radha  Number times ambulated in hallways past shift: 0  Number of times OOB to chair past shift: 0    Cardiac:   Cardiac Monitoring: Yes      Cardiac Rhythm: Sinus Rhythm    Access:  Current line(s): PIV     Genitourinary:   Urinary status: voiding    Respiratory:   O2 Device: None (Room air)  Chronic home O2 use?: NO  Incentive spirometer at bedside: NO       GI:  Last Bowel Movement Date: 02/06/23  Current diet:  ADULT DIET Regular; 4 carb choices (60 gm/meal)  Passing flatus: YES  Tolerating current diet: YES       Pain Management:   Patient states pain is manageable on current regimen: YES    Skin:  Wisam Score: 21  Interventions: increase time out of bed and nutritional support     Patient Safety:  Fall Score:  Total Score: 0  Interventions: gripper socks       Length of Stay:  Expected LOS: 3d 19h  Actual LOS: [address] Johny, RN

## 2023-02-08 NOTE — DISCHARGE INSTRUCTIONS
Discharge Instructions       PATIENT ID: Claudell Knoll  MRN: [id number]   YOB: 1975    DATE OF ADMISSION: 2/2/2023   DATE OF DISCHARGE: 2/8/2023    PRIMARY CARE PROVIDER: Lou Gamez     ATTENDING PHYSICIAN: Shayla Hanley MD   DISCHARGING PROVIDER: Winston Antony MD    To contact this individual call [phone] and ask the  to page. If unavailable ask to be transferred the Adult Hospitalist Department. DISCHARGE DIAGNOSES   Altered mental status due to acute metabolic encephalopathy    Intractable nausea and vomiting possible due to gastroparesis and pancreatic insufficiency  SIRS possible unclear source  T2DM insulin dependent, s/p Islet cell transplant  Chronic pancreatic insufficiency  Hx of MDD/AYDIN  Hx of HLD    CONSULTATIONS:   Neurology  GI    PROCEDURES/SURGERIES: * No surgery found *    PENDING TEST RESULTS:   At the time of discharge the following test results are still pending: None    FOLLOW UP APPOINTMENTS:   Lou Gamez MD in  one week   Outpatient follow up with Endocrinologist in 2-3 weeks  Outpatient follow up with Gastroenterologist in 2 weeks    ADDITIONAL CARE RECOMMENDATIONS: None     DIET: Diabetic Diet    ACTIVITY: Activity as tolerated    WOUND CARE: None    EQUIPMENT needed: None      DISCHARGE MEDICATIONS:   See Medication Reconciliation Form    It is important that you take the medication exactly as they are prescribed. Keep your medication in the bottles provided by the pharmacist and keep a list of the medication names, dosages, and times to be taken in your wallet. Do not take other medications without consulting your doctor. NOTIFY YOUR PHYSICIAN FOR ANY OF THE FOLLOWING:   Fever over 101 degrees for 24 hours. Chest pain, shortness of breath, fever, chills, nausea, vomiting, diarrhea, change in mentation, falling, weakness, bleeding. Severe pain or pain not relieved by medications.   Or, any other signs or symptoms that you may have questions about. DISPOSITION:    Home With:   OT  PT  HH  RN       SNF/Inpatient Rehab/LTAC   x Independent/assisted living    Hospice    Other:     CDMP Checked: Yes X     PROBLEM LIST Updated:   Yes X       Signed:   Maty Cote MD  2/8/2023  7:47 AM

## 2023-02-08 NOTE — PROGRESS NOTES
{[facility] BEDSIDE_VERBAL_RECORDED_WRITTEN:[id number]} shift change report given to *** (oncoming nurse) by *** (offgoing nurse). Report included the following information {SBAR REPORTS PCVU:[id number]}.

## 2023-02-08 NOTE — DISCHARGE SUMMARY
Discharge Summary       PATIENT ID: Fernando Becker  MRN: [id number]   YOB: 1975    DATE OF ADMISSION: 2/2/2023  3:17 PM    DATE OF DISCHARGE:2/8/2023  PRIMARY CARE PROVIDER: Chely Wilson MD     ATTENDING PHYSICIAN: Gris Kelly MD   DISCHARGING PROVIDER: Angelika Cuenca MD      CONSULTATIONS: IP CONSULT TO GASTROENTEROLOGY  IP CONSULT TO NEUROLOGY    PROCEDURES/SURGERIES: * No surgery found *    ADMISSION SUMMARY AND HOSPITAL COURSE:     Fernando Becker is a 49 y.o. 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 Endorses hx of recurrent episodes episodes of nausea requiring admission. Has also had some associated loose stools. Denies any associated melena or hematochezia. The patient denies any fever, chills, chest pain, ongoing nausea, vomiting, cough, congestion, recent illness, palpitations, or dysuria. Remarkable vitals on ER Presentation: vss  Labs Remarkable for: glu 300,   ER Images: ct abd et pelvis: no acute process  ER rx: morphine, zofran, 1L NS Bolus    Altered mental status due to acute metabolic encephalopathy   -confusion and hallucination resolved  -had episode of less responsiveness, and shaking of extremities but awake during those episode on 2/5, had hallucination last 2/5.    -dilaudid has discontinued on 2/6  -CT head no acute intracranial process  -continue neuro check and supportive care  -seen by neurology   Intractable nausea and vomiting possible due to gastroparesis and pancreatic insufficiency  -nausea and vomiting resolved  -advanced diet to regular diet and tolerated.    -CT abdomen and pelvis no acute abdominal or pelvic pathology, extensive postsurgical change, no significant change  -discontinue IVF, Pepcid, continue pancreatic enzyme, prn bentyl, zofran,   -discontinue dilaudid -GI is on board  SIRS unclear source  -Aspiration pneumonia ruled out after study  -SpO2 86% on RA on 2/6, tachycardic  and spike fever  -SpO2 improved with oxygen 91-97% with nasal oxygen  -COVID19 Rapid test negative, respiratory panel negative  -chest x ray no acute process on chest x ray   -continue iv zosyn, IVF, switch to Augmentin on discharge with floraQ  -last fever was 100.8 2/6 2045  -monitor blood cx,    T2DM insulin dependent, s/p Islet cell transplant  -had episode of low blood sugar 70 on 6/5 morning, treated and improved  -A1c 8.1  -DTC consulted  and placed NPH 5 units q 12, lispro 5 units q ac, sliding scale and monitor finger stick glucose  -She said she wants to continue her insulin pump on discharge  Chronic pancreatic insufficiency  -Patient with intractable nausea and vomiting on presentation, improving now  -Continue pancreatic enzyme, discontinue IV fluid, on as needed Zofran  Hx of MDD/AYDIN  -Stable, continue home Cymbalta, Zoloft and trazodone  Hx of HLD  -Continue home Lipitor        Code status: Full code  Prophylaxis: Lovenox    DISCHARGE DIAGNOSES / PLAN:      Altered mental status due to acute metabolic encephalopathy   -confusion and hallucination resolved  -conscious and alert, well oriented, ambulate and back to her base line   Intractable nausea and vomiting possible due to gastroparesis and pancreatic insufficiency  -improved, tolerated her regular diet   -continue pancreatic enzyme, prn bentyl, zofran,   -outpatient follow up with GI  SIRS unclear source  -Aspiration pneumonia ruled out after study  -fever resolved  -switch to Augmentin on discharge with floraQ  T2DM insulin dependent, s/p Islet cell transplant  -continue home insulin pump on discharge  -follow up with endocrinologist  Chronic pancreatic insufficiency  -resolved  -Continue pancreatic enzyme,   Hx of MDD/AYDIN  -Stable, continue home Cymbalta, Zoloft and trazodone  Hx of HLD  -Continue home Lipitor       DIET: Diabetic Diet    ACTIVITY: Activity as tolerated    EQUIPMENT needed: None    NOTIFY YOUR PHYSICIAN FOR ANY OF THE FOLLOWING:   Fever over 101 degrees for 24 hours. Chest pain, shortness of breath, fever, chills, nausea, vomiting, diarrhea, change in mentation, falling, weakness, bleeding. Severe pain or pain not relieved by medications, as well as any other signs or symptoms that you may have questions about. FOLLOW UP APPOINTMENTS:    Follow-up Information       Follow up With Specialties Details Why Contact Info    Meme Larkin MD Family Medicine In one week   [address] [place] [id number] [phone]            Outpatient follow up with Endocrinologist in 2-3 weeks      Outpatient follow up with Gastroenterologist in 2 weeks      DISCHARGE MEDICATIONS:  Current Discharge Medication List        START taking these medications    Details   amoxicillin-clavulanate (Augmentin) 875-125 mg per tablet Take 1 Tablet by mouth two (2) times a day for 5 days. Qty: 10 Tablet, Refills: 0  Start date: 2/8/2023, End date: 2/13/2023      L.acid,para-B. bifidum-S.therm (RISAQUAD) 8 billion cell cap cap Take 1 Capsule by mouth daily. Qty: 10 Capsule, Refills: 0  Start date: 2/8/2023           CONTINUE these medications which have NOT CHANGED    Details   LORazepam (ATIVAN) 0.5 mg tablet Take 0.5 mg by mouth two (2) times a day. Indications: anxious      lubiPROStone (AMITIZA) 24 mcg capsule Take 24 mcg by mouth daily (with breakfast). Indications: chronic idiopathic constipation      DULoxetine (CYMBALTA) 60 mg capsule Take 60 mg by mouth daily. pantoprazole (Protonix) 40 mg tablet Take 40 mg by mouth two (2) times a day. Indications: indigestion      prochlorperazine (Compazine) 10 mg tablet Take 1 Tablet by mouth every eight (8) hours as needed for Nausea.   Qty: 12 Tablet, Refills: 0      dicyclomine (BENTYL) 20 mg tablet Take 1 Tablet by mouth every six (6) hours as needed for Abdominal Cramps. Qty: 12 Tablet, Refills: 0      ondansetron (ZOFRAN ODT) 4 mg disintegrating tablet Take 1 Tab by mouth every eight (8) hours as needed for Nausea. Qty: 10 Tab, Refills: 0      buPROPion SR (WELLBUTRIN SR) 150 mg SR tablet Take 450 mg by mouth daily. Indications: anxiousness associated with depression      traZODone (DESYREL) 100 mg tablet Take 200 mg by mouth nightly. 1/2 to 1 every night      atorvastatin (LIPITOR) 40 mg tablet Take 40 mg by mouth nightly. levothyroxine (SYNTHROID) 112 mcg tablet Take 112 mcg by mouth Daily (before breakfast). insulin lispro (HUMALOG) 100 unit/mL injection 4 Units by SubCUTAneous route Before breakfast, lunch, and dinner. gabapentin (NEURONTIN) 300 mg capsule Take 600 mg by mouth four (4) times daily. Indications: neuropathic pain      sertraline (ZOLOFT) 50 mg tablet Take 50 mg by mouth daily. folic acid (FOLVITE) 1 mg tablet Take 1 Tab by mouth daily. Qty: 30 Tab, Refills: 0      amylase-lipase-protease (CREON) 24,000-76,000 -120,000 unit capsule Take 1 Cap by mouth three (3) times daily (with meals).   Qty: 90 Cap, Refills: 0           STOP taking these medications       ciprofloxacin HCl (CIPRO) 500 mg tablet Comments:   Reason for Stopping:         ergocalciferol (VITAMIN D2) 50,000 unit capsule Comments:   Reason for Stopping:               DISPOSITION:    Home With:   OT  PT  HH  RN       Long term SNF/Inpatient Rehab   x Independent/assisted living    Hospice    Other:     PATIENT CONDITION AT DISCHARGE:     Functional status    Poor     Deconditioned    x Independent      Cognition   x  Lucid     Forgetful     Dementia      Catheters/lines (plus indication)    Mckay     PICC     PEG    x None      Code status    x Full code     DNR      PHYSICAL EXAMINATION AT DISCHARGE:  Patient Vitals for the past 24 hrs:   Temp Pulse Resp BP SpO2   02/08/23 0600 -- 67 -- -- --   02/08/23 0459 98 °F (36.7 °C) 70 17 124/78 94 %   02/08/23 0400 -- 67 -- -- -- 02/08/23 0200 -- 70 -- -- --   02/08/23 0022 98 °F (36.7 °C) 68 17 128/77 96 %   02/08/23 0005 -- 71 -- -- --   02/07/23 2200 -- 81 -- -- --   02/07/23 2024 99 °F (37.2 °C) 70 16 (!) 150/80 96 %   02/07/23 2005 -- 73 -- -- --   02/07/23 1800 -- 75 -- -- --   02/07/23 1541 98.2 °F (36.8 °C) 67 18 138/80 96 %   02/07/23 1400 -- 76 -- -- --   02/07/23 1249 99.1 °F (37.3 °C) 79 18 (!) 147/78 95 %   02/07/23 1200 -- 71 -- -- --   02/07/23 1000 -- 86 -- -- --        General:          Alert, cooperative, no distress, appears stated age. HEENT:           Atraumatic, anicteric sclerae, pink conjunctivae                          No oral ulcers, mucosa moist, throat clear, dentition fair  Neck:               Supple, symmetrical  Lungs:             Clear to auscultation bilaterally. No Wheezing or Rhonchi. No rales. Chest wall:      No tenderness  No Accessory muscle use. Heart:              Regular  rhythm,  No  murmur   No edema  Abdomen:        Soft, non-tender. Not distended. Bowel sounds normal  Extremities:     No cyanosis. No clubbing,                            Skin turgor normal, Capillary refill normal  Skin:                Not pale. Not Jaundiced  No rashes   Psych:             Not anxious or agitated.   Neurologic:      Alert, moves all extremities, answers questions appropriately and responds to commands       Recent Results (from the past 24 hour(s))   COVID-19 RAPID TEST    Collection Time: 02/07/23  8:54 AM   Result Value Ref Range    Specimen source Nasopharyngeal      COVID-19 rapid test Not detected NOTD     GLUCOSE, POC    Collection Time: 02/07/23 12:44 PM   Result Value Ref Range    Glucose (POC) 249 (H) 65 - 117 mg/dL    Performed by Alyssa Mccarthy, POC    Collection Time: 02/07/23  5:43 PM   Result Value Ref Range    Glucose (POC) 286 (H) 65 - 117 mg/dL    Performed by [address] [place], [place] [id number] angel, POC    Collection Time: 02/07/23  9:15 PM   Result Value Ref Range    Glucose (POC) 330 (H) 65 - 117 mg/dL    Performed by Erenest Patience    METABOLIC PANEL, COMPREHENSIVE    Collection Time: 02/08/23  3:12 AM   Result Value Ref Range    Sodium 142 136 - 145 mmol/L    Potassium 4.1 3.5 - 5.1 mmol/L    Chloride 109 (H) 97 - 108 mmol/L    CO2 28 21 - 32 mmol/L    Anion gap 5 5 - 15 mmol/L    Glucose 250 (H) 65 - 100 mg/dL    BUN 3 (L) 6 - 20 MG/DL    Creatinine 0.62 0.55 - 1.02 MG/DL    BUN/Creatinine ratio 5 (L) 12 - 20      eGFR >60 >60 ml/min/1.73m2    Calcium 8.9 8.5 - 10.1 MG/DL    Bilirubin, total 0.6 0.2 - 1.0 MG/DL    ALT (SGPT) 40 12 - 78 U/L    AST (SGOT) 19 15 - 37 U/L    Alk.  phosphatase 66 45 - 117 U/L    Protein, total 5.7 (L) 6.4 - 8.2 g/dL    Albumin 2.9 (L) 3.5 - 5.0 g/dL    Globulin 2.8 2.0 - 4.0 g/dL    A-G Ratio 1.0 (L) 1.1 - 2.2     CBC W/O DIFF    Collection Time: 02/08/23  3:12 AM   Result Value Ref Range    WBC 9.2 3.6 - 11.0 K/uL    RBC 3.70 (L) 3.80 - 5.20 M/uL    HGB 11.2 (L) 11.5 - 16.0 g/dL    HCT 33.7 (L) 35.0 - 47.0 %    MCV 91.1 80.0 - 99.0 FL    MCH 30.3 26.0 - 34.0 PG    MCHC 33.2 30.0 - 36.5 g/dL    RDW 14.5 11.5 - 14.5 %    PLATELET 705 (H) 723 - 400 K/uL    MPV 10.7 8.9 - 12.9 FL    NRBC 0.0 0  WBC    ABSOLUTE NRBC 0.00 0.00 - 0.01 K/uL   GLUCOSE, POC    Collection Time: 02/08/23  6:52 AM   Result Value Ref Range    Glucose (POC) 281 (H) 65 - 117 mg/dL    Performed by Erenest Patience       CHRONIC MEDICAL DIAGNOSES:  Problem List as of 2/8/2023 Date Reviewed: 5/4/2015            Codes Class Noted - Resolved    Hyperglycemia ICD-10-CM: R73.9  ICD-9-CM: 790.29  2/2/2023 - Present        Intractable nausea and vomiting ICD-10-CM: R11.2  ICD-9-CM: 536.2  2/2/2023 - Present        DM I (diabetes mellitus, type I) ([facility] ICD-10-CM: E10.9  ICD-9-CM: 250.01  2/2/2023 - Present        Pancreatic pseudocyst ICD-10-CM: K86.3  ICD-9-CM: 577.2  5/6/2015 - Present        Necrotizing pancreatitis ICD-10-CM: K85.91  ICD-9-CM: 708.2  5/4/2015 - Present        Family hx of ovarian malignancy ICD-10-CM: Z80.41  ICD-9-CM: V16.41  4/26/2015 - Present        Family hx-breast malignancy ICD-10-CM: Z80.3  ICD-9-CM: V16.3  4/26/2015 - Present        Breast swelling ICD-10-CM: N63.0  ICD-9-CM: 611.72  4/26/2015 - Present        Acute pancreatitis ICD-10-CM: K85.90  ICD-9-CM: 270.7  3/9/2015 - Present           Greater than 45 minutes were spent with the patient on counseling and coordination of care    Signed:   Jerson Roque MD  2/8/2023  8:21 AM

## 2023-02-08 NOTE — PROGRESS NOTES
Physician Progress Note      PATIENT:               Johnita Boast  CSN #:                  [id number]  :                       1975  ADMIT DATE:       2023 3:17 PM  [address] [place] DATE:  RESPONDING  PROVIDER #:        Rufina Multani MD          QUERY TEXT:    Patient admitted with intractable nausea/vomiting. Hospitalist has documented SIRS possible/questionable aspiration pneumonia and noted no acute process on CXR. CT of abd/pelvis also showed no significant abnormality in the incidentally imaged lower chest. Pt is receiving IV Zosyn. In order to support the diagnosis of aspiration pneumonia, please include additional clinical indicators in your documentation. Or please document if the diagnosis of aspiration pneumonia has been ruled out after further study. The medical record reflects the following:    Risk Factors: 49 y.o. female with PMH of MDD w/ AYDIN, hypothyroidism, dyslipidemia, pancreatic insufficiency, IDDM II s/p isclet cell transplant, who was admitted for intractable nausea and vomiting    Clinical Indicators:    -- Hospitalist documented: \"SIRS possible ? Aspiration pneumonia -SpO2 86% on RA on , tachycardic and spike fever -SpO2 improved with oxygen 91-97% with nasal oxygen -COVID19 Rapid test negative, espiratory panel negative -chest x ray no acute process on chest x ray -last fever was 100.8 2045\"    -- CXR  = The lungs and pleural spaces are clear. The visualized bones and upper abdomen  are age-appropriate. IMPRESSION No acute process on portable chest.    -- CT abd/pelv  = LOWER THORAX: No significant abnormality in the incidentally imaged lower chest. No acute abdominal or pelvic pathology.     -- On : WBC = 8.2, temp = 100.8, HR up to 136, sats to 86%, placed on O2; on : WBC = 14.5; on : WBC = 11.3; tmax = 100.8 throughout admission to date    Treatment: IV Zosyn, IVF, imaging studies, serial lab & vitals monitoring, supplemental O2 to 3 LPM but currently on RA    Thank-you,  Gina Harding, RN, [facility]  Clinical   Juan [email or web address]. [email or web address]  [phone]  You can also contact me via [facility]. Options provided:  -- Aspiration pneumonia present as evidenced by, Please document evidence. -- Aspiration pneumonia was ruled out  -- Other - I will add my own diagnosis  -- Disagree - Not applicable / Not valid  -- Disagree - Clinically unable to determine / Unknown  -- Refer to Clinical Documentation Reviewer    PROVIDER RESPONSE TEXT:    Aspiration pneumonia was ruled out after study.     Query created by: Joe Allen on 2/7/2023 12:15 PM      Electronically signed by:  Doris Carter MD 2/8/2023 7:14 AM

## 2023-02-08 NOTE — PROGRESS NOTES
Problem: Pain  Goal: *Control of Pain  Outcome: Resolved/Met     Problem: Patient Education: Go to Patient Education Activity  Goal: Patient/Family Education  Outcome: Resolved/Met     Problem: Diabetes Self-Management  Goal: *Disease process and treatment process  Description: Define diabetes and identify own type of diabetes; list 3 options for treating diabetes. Outcome: Resolved/Met  Goal: *Incorporating nutritional management into lifestyle  Description: Describe effect of type, amount and timing of food on blood glucose; list 3 methods for planning meals. Outcome: Resolved/Met  Goal: *Incorporating physical activity into lifestyle  Description: State effect of exercise on blood glucose levels. Outcome: Resolved/Met  Goal: *Developing strategies to promote health/change behavior  Description: Define the ABC's of diabetes; identify appropriate screenings, schedule and personal plan for screenings. Outcome: Resolved/Met  Goal: *Using medications safely  Description: State effect of diabetes medications on diabetes; name diabetes medication taking, action and side effects. Outcome: Resolved/Met  Goal: *Monitoring blood glucose, interpreting and using results  Description: Identify recommended blood glucose targets  and personal targets. Outcome: Resolved/Met  Goal: *Prevention, detection, treatment of acute complications  Description: List symptoms of hyper- and hypoglycemia; describe how to treat low blood sugar and actions for lowering  high blood glucose level. Outcome: Resolved/Met  Goal: *Prevention, detection and treatment of chronic complications  Description: Define the natural course of diabetes and describe the relationship of blood glucose levels to long term complications of diabetes.   Outcome: Resolved/Met  Goal: *Developing strategies to address psychosocial issues  Description: Describe feelings about living with diabetes; identify support needed and support network  Outcome: Resolved/Met  Goal: *Insulin pump training  Outcome: Resolved/Met  Goal: *Sick day guidelines  Outcome: Resolved/Met  Goal: *Patient Specific Goal (EDIT GOAL, INSERT TEXT)  Outcome: Resolved/Met     Problem: Patient Education: Go to Patient Education Activity  Goal: Patient/Family Education  Outcome: Resolved/Met     Problem: General Medical Care Plan  Goal: *Vital signs within specified parameters  Outcome: Resolved/Met  Goal: *Labs within defined limits  Outcome: Resolved/Met

## 2023-02-08 NOTE — PROGRESS NOTES
Problem: Pain  Goal: *Control of Pain  Outcome: Progressing Towards Goal  Note:   Assess pain characteristics (eg: Intensity scale; onset; location; quality; severity; duration; frequency;   radiation)  Assess pain management - barriers (eg: Past pain   experiences)  Identify pain expectations (eg: Patient's pain goal; somatic experiences; behavioral changes;   affect)  Identify pain medication concerns (eg: Cultural considerations; addiction   concerns)  Support system identification (eg: Caregiver; community resource; family; friends; [employer]; support   group)  Monitor for change in patient condition (eg: Vital signs changes; changes in level of consciousness; nausea; behavioral   changes)  Medication side-effect   assessment  Pain-relief response reassessment (eg: Frequency based on route of administration;   effectiveness)     Problem: General Medical Care Plan  Goal: *Vital signs within specified parameters  Outcome: Progressing Towards Goal  Note:   Monitor for change in patient condition (eg: Altered mental status; monitor vital signs; diaphoresis; dizziness; pallor; dyspnea;   palpitations)  Goal: *Labs within defined limits  Outcome: Progressing Towards Goal

## 2023-02-09 LAB — VIT B1 BLD-SCNC: 102.5 NMOL/L (ref 66.5–200)

## 2023-02-11 LAB
BACTERIA SPEC CULT: NORMAL
SERVICE CMNT-IMP: NORMAL

## 2023-05-17 RX ORDER — PROCHLORPERAZINE MALEATE 10 MG
10 TABLET ORAL EVERY 8 HOURS PRN
COMMUNITY
Start: 2021-09-15

## 2023-05-17 RX ORDER — DULOXETIN HYDROCHLORIDE 60 MG/1
60 CAPSULE, DELAYED RELEASE ORAL DAILY
COMMUNITY

## 2023-05-17 RX ORDER — LEVOTHYROXINE SODIUM 112 UG/1
112 TABLET ORAL
COMMUNITY

## 2023-05-17 RX ORDER — LORAZEPAM 0.5 MG/1
0.5 TABLET ORAL 2 TIMES DAILY
COMMUNITY

## 2023-05-17 RX ORDER — LUBIPROSTONE 24 UG/1
24 CAPSULE ORAL
COMMUNITY

## 2023-05-17 RX ORDER — ONDANSETRON 4 MG/1
4 TABLET, ORALLY DISINTEGRATING ORAL EVERY 8 HOURS PRN
COMMUNITY
Start: 2017-07-26

## 2023-05-17 RX ORDER — DICYCLOMINE HCL 20 MG
20 TABLET ORAL EVERY 6 HOURS PRN
COMMUNITY
Start: 2021-09-15

## 2023-05-17 RX ORDER — INSULIN LISPRO 100 [IU]/ML
4 INJECTION, SOLUTION INTRAVENOUS; SUBCUTANEOUS
COMMUNITY

## 2023-05-17 RX ORDER — BUPROPION HYDROCHLORIDE 150 MG/1
450 TABLET, EXTENDED RELEASE ORAL DAILY
COMMUNITY

## 2023-05-17 RX ORDER — GABAPENTIN 300 MG/1
600 CAPSULE ORAL 4 TIMES DAILY
COMMUNITY

## 2023-05-17 RX ORDER — PANTOPRAZOLE SODIUM 40 MG/1
40 TABLET, DELAYED RELEASE ORAL 2 TIMES DAILY
COMMUNITY

## 2023-05-17 RX ORDER — TRAZODONE HYDROCHLORIDE 100 MG/1
200 TABLET ORAL
COMMUNITY

## 2023-05-17 RX ORDER — ATORVASTATIN CALCIUM 40 MG/1
40 TABLET, FILM COATED ORAL NIGHTLY
COMMUNITY

## 2023-05-17 RX ORDER — FOLIC ACID 1 MG/1
1 TABLET ORAL DAILY
COMMUNITY
Start: 2015-03-26

## 2023-06-12 ENCOUNTER — OFFICE VISIT (OUTPATIENT)
Dept: PRIMARY CARE | Facility: CLINIC | Age: 48
End: 2023-06-12
Payer: COMMERCIAL

## 2023-06-12 VITALS
SYSTOLIC BLOOD PRESSURE: 110 MMHG | HEART RATE: 62 BPM | TEMPERATURE: 98.2 F | RESPIRATION RATE: 16 BRPM | HEIGHT: 66 IN | DIASTOLIC BLOOD PRESSURE: 68 MMHG | WEIGHT: 147 LBS | BODY MASS INDEX: 23.63 KG/M2

## 2023-06-12 DIAGNOSIS — G43.809 OTHER MIGRAINE WITHOUT STATUS MIGRAINOSUS, NOT INTRACTABLE: ICD-10-CM

## 2023-06-12 DIAGNOSIS — Z00.00 ENCOUNTER FOR PREVENTIVE HEALTH EXAMINATION: Primary | ICD-10-CM

## 2023-06-12 DIAGNOSIS — Z97.5 IUD CONTRACEPTION: ICD-10-CM

## 2023-06-12 DIAGNOSIS — Z12.39 BREAST SCREENING: ICD-10-CM

## 2023-06-12 PROBLEM — D22.9 NEVUS: Status: ACTIVE | Noted: 2023-06-12

## 2023-06-12 PROBLEM — M76.62 CALCIFIC ACHILLES TENDINITIS OF LEFT LOWER EXTREMITY: Status: ACTIVE | Noted: 2023-06-12

## 2023-06-12 PROBLEM — R63.5 WEIGHT GAIN: Status: RESOLVED | Noted: 2023-06-12 | Resolved: 2023-06-12

## 2023-06-12 PROBLEM — L81.1 MELASMA: Status: ACTIVE | Noted: 2023-06-12

## 2023-06-12 PROBLEM — R42 POSTURAL DIZZINESS: Status: ACTIVE | Noted: 2023-06-12

## 2023-06-12 PROBLEM — R04.0 RECURRENT EPISTAXIS: Status: ACTIVE | Noted: 2023-06-12

## 2023-06-12 PROBLEM — D69.6 THROMBOCYTOPENIA (CMS-HCC): Status: RESOLVED | Noted: 2023-06-12 | Resolved: 2023-06-12

## 2023-06-12 PROBLEM — M65.28 CALCIFIC ACHILLES TENDINITIS OF LEFT LOWER EXTREMITY: Status: ACTIVE | Noted: 2023-06-12

## 2023-06-12 PROBLEM — D22.9 CHANGING NEVUS: Status: ACTIVE | Noted: 2023-06-12

## 2023-06-12 PROBLEM — R63.5 WEIGHT GAIN: Status: ACTIVE | Noted: 2023-06-12

## 2023-06-12 PROBLEM — F41.9 ANXIETY DISORDER: Status: ACTIVE | Noted: 2023-06-12

## 2023-06-12 PROBLEM — D69.6 THROMBOCYTOPENIA (CMS-HCC): Status: ACTIVE | Noted: 2023-06-12

## 2023-06-12 PROBLEM — G43.909 MIGRAINE HEADACHE: Status: ACTIVE | Noted: 2023-06-12

## 2023-06-12 PROBLEM — L81.4 SOLAR LENTIGO: Status: ACTIVE | Noted: 2023-06-12

## 2023-06-12 PROBLEM — D22.9 NEVUS: Status: RESOLVED | Noted: 2023-06-12 | Resolved: 2023-06-12

## 2023-06-12 PROCEDURE — 99396 PREV VISIT EST AGE 40-64: CPT | Performed by: FAMILY MEDICINE

## 2023-06-12 PROCEDURE — 1036F TOBACCO NON-USER: CPT | Performed by: FAMILY MEDICINE

## 2023-06-12 RX ORDER — SUMATRIPTAN SUCCINATE 25 MG/1
TABLET ORAL 2 TIMES DAILY
COMMUNITY
Start: 2019-08-22

## 2023-06-12 RX ORDER — LEVONORGESTREL 52 MG/1
INTRAUTERINE DEVICE INTRAUTERINE
COMMUNITY
Start: 2019-08-22 | End: 2024-03-01 | Stop reason: SINTOL

## 2023-06-12 ASSESSMENT — PROMIS GLOBAL HEALTH SCALE
RATE_QUALITY_OF_LIFE: EXCELLENT
RATE_PHYSICAL_HEALTH: EXCELLENT
RATE_MENTAL_HEALTH: VERY GOOD
RATE_AVERAGE_PAIN: 0
CARRYOUT_SOCIAL_ACTIVITIES: EXCELLENT
EMOTIONAL_PROBLEMS: RARELY
CARRYOUT_PHYSICAL_ACTIVITIES: COMPLETELY
RATE_SOCIAL_SATISFACTION: EXCELLENT
RATE_GENERAL_HEALTH: EXCELLENT

## 2023-06-12 NOTE — PROGRESS NOTES
Meryl Loaiza is a 48 y.o. female here today for   Chief Complaint   Patient presents with    Annual Exam        HPI     Patient is here for a periodic health exam.  I reviewed previous preventative health measures including screening tests, immunizations and labs.     She still works out regularly.  Has lost 11 lbs with better diet and exercise.  More veggies and lower fat.      She reports that she needs to have her Mirena removed since she says it has been about 6 or 7 years when it was placed.  Last pap was 3 years ago.  Due for mammograms.  I have referred her to a gynecologist last year but she says they were not excepting new patients.  She would like a referral to a different gynecologist.    Migraines - very rare and none for over 6 months.      Cologuard negative last year.        Objective    Visit Vitals  /68   Pulse 62   Temp 36.8 °C (98.2 °F)   Resp 16     Body mass index is 23.73 kg/m².     Physical Exam  Vitals and nursing note reviewed.   Constitutional:       General: She is not in acute distress.     Appearance: Normal appearance.   HENT:      Head: Normocephalic and atraumatic.      Right Ear: Tympanic membrane, ear canal and external ear normal.      Left Ear: Tympanic membrane, ear canal and external ear normal.      Nose: Nose normal.      Mouth/Throat:      Mouth: Mucous membranes are moist.      Pharynx: Oropharynx is clear.   Eyes:      Extraocular Movements: Extraocular movements intact.      Conjunctiva/sclera: Conjunctivae normal.      Pupils: Pupils are equal, round, and reactive to light.   Cardiovascular:      Rate and Rhythm: Normal rate and regular rhythm.      Pulses: Normal pulses.      Heart sounds: Normal heart sounds. No murmur heard.     No friction rub. No gallop.   Pulmonary:      Effort: Pulmonary effort is normal. No respiratory distress.      Breath sounds: Normal breath sounds.   Chest:   Breasts:     Right: Normal. No mass, nipple discharge or skin change.       Left: Normal. No mass, nipple discharge or skin change.   Abdominal:      General: Abdomen is flat. Bowel sounds are normal. There is no distension.      Palpations: Abdomen is soft.      Tenderness: There is no abdominal tenderness.   Musculoskeletal:         General: Normal range of motion.      Cervical back: Normal range of motion and neck supple.   Lymphadenopathy:      Cervical: No cervical adenopathy.      Upper Body:      Right upper body: No axillary adenopathy.      Left upper body: No axillary adenopathy.   Skin:     General: Skin is warm and dry.      Findings: No lesion or rash.   Neurological:      General: No focal deficit present.      Mental Status: She is alert. Mental status is at baseline.   Psychiatric:         Mood and Affect: Mood normal.         Behavior: Behavior normal.         Thought Content: Thought content normal.         Judgment: Judgment normal.            Assessment    1. Encounter for preventive health examination  CBC, Lipid Panel, Thyroid Stimulating Hormone, Vitamin D, Total, Comprehensive Metabolic Panel   Recommend regular exercise, balanced diet, regular dental exams, and healthy habits.  Appropriate labs ordered or reviewed.  Her immunizations are up-to-date.  She had a negative Cologuard test last year.  I recommend monthly self breast exams and have ordered a mammogram.  I recommend a yearly flu shot in the fall and I recommend a yearly wellness exam.     2. IUD contraception  Referral to Gynecology   I will refer her to Dr. Cheek for gynecological care.  The patient needs to have her Mirena IUD removed and she will see the new gynecologist or me for a Pap test in the next 1 to 2 years.     3. Breast screening  BI mammo bilateral screening tomosynthesis   A screening mammogram was ordered     4. Other migraine without status migrainosus, not intractable     Her migraine headaches are very rare and she takes Imitrex when needed.

## 2023-06-17 ENCOUNTER — APPOINTMENT (OUTPATIENT)
Facility: HOSPITAL | Age: 48
End: 2023-06-17
Payer: COMMERCIAL

## 2023-06-17 ENCOUNTER — HOSPITAL ENCOUNTER (EMERGENCY)
Facility: HOSPITAL | Age: 48
Discharge: HOME | End: 2023-06-17
Attending: EMERGENCY MEDICINE
Payer: COMMERCIAL

## 2023-06-17 VITALS
DIASTOLIC BLOOD PRESSURE: 93 MMHG | BODY MASS INDEX: 25.73 KG/M2 | SYSTOLIC BLOOD PRESSURE: 126 MMHG | OXYGEN SATURATION: 96 % | RESPIRATION RATE: 14 BRPM | HEART RATE: 100 BPM | WEIGHT: 140.65 LBS | TEMPERATURE: 98.6 F

## 2023-06-17 DIAGNOSIS — R11.0 NAUSEA: Primary | ICD-10-CM

## 2023-06-17 DIAGNOSIS — R10.84 GENERALIZED ABDOMINAL PAIN: ICD-10-CM

## 2023-06-17 LAB
ALBUMIN SERPL-MCNC: 3.8 G/DL (ref 3.5–5)
ALBUMIN/GLOB SERPL: 1.1 (ref 1.1–2.2)
ALP SERPL-CCNC: 78 U/L (ref 45–117)
ALT SERPL-CCNC: 56 U/L (ref 12–78)
ANION GAP SERPL CALC-SCNC: 6 MMOL/L (ref 5–15)
APPEARANCE UR: CLEAR
AST SERPL-CCNC: 37 U/L (ref 15–37)
BACTERIA URNS QL MICRO: NEGATIVE /HPF
BASOPHILS # BLD: 0.1 K/UL (ref 0–0.1)
BASOPHILS NFR BLD: 1 % (ref 0–1)
BILIRUB SERPL-MCNC: 1.1 MG/DL (ref 0.2–1)
BILIRUB UR QL: NEGATIVE
BUN SERPL-MCNC: 14 MG/DL (ref 6–20)
BUN/CREAT SERPL: 14 (ref 12–20)
CALCIUM SERPL-MCNC: 9.4 MG/DL (ref 8.5–10.1)
CHLORIDE SERPL-SCNC: 101 MMOL/L (ref 97–108)
CO2 SERPL-SCNC: 26 MMOL/L (ref 21–32)
COLOR UR: ABNORMAL
CREAT SERPL-MCNC: 1.01 MG/DL (ref 0.55–1.02)
DIFFERENTIAL METHOD BLD: ABNORMAL
EOSINOPHIL # BLD: 0.1 K/UL (ref 0–0.4)
EOSINOPHIL NFR BLD: 1 % (ref 0–7)
EPITH CASTS URNS QL MICRO: ABNORMAL /LPF
ERYTHROCYTE [DISTWIDTH] IN BLOOD BY AUTOMATED COUNT: 15.7 % (ref 11.5–14.5)
GLOBULIN SER CALC-MCNC: 3.6 G/DL (ref 2–4)
GLUCOSE SERPL-MCNC: 314 MG/DL (ref 65–100)
GLUCOSE UR STRIP.AUTO-MCNC: 500 MG/DL
HCT VFR BLD AUTO: 40.8 % (ref 35–47)
HGB BLD-MCNC: 13.3 G/DL (ref 11.5–16)
HGB UR QL STRIP: NEGATIVE
HYALINE CASTS URNS QL MICRO: ABNORMAL /LPF (ref 0–5)
IMM GRANULOCYTES # BLD AUTO: 0 K/UL (ref 0–0.04)
IMM GRANULOCYTES NFR BLD AUTO: 0 % (ref 0–0.5)
KETONES UR QL STRIP.AUTO: 15 MG/DL
LEUKOCYTE ESTERASE UR QL STRIP.AUTO: NEGATIVE
LYMPHOCYTES # BLD: 1.4 K/UL (ref 0.8–3.5)
LYMPHOCYTES NFR BLD: 15 % (ref 12–49)
MAGNESIUM SERPL-MCNC: 1.6 MG/DL (ref 1.6–2.4)
MCH RBC QN AUTO: 29.4 PG (ref 26–34)
MCHC RBC AUTO-ENTMCNC: 32.6 G/DL (ref 30–36.5)
MCV RBC AUTO: 90.1 FL (ref 80–99)
MONOCYTES # BLD: 0.5 K/UL (ref 0–1)
MONOCYTES NFR BLD: 5 % (ref 5–13)
NEUTS SEG # BLD: 7.3 K/UL (ref 1.8–8)
NEUTS SEG NFR BLD: 78 % (ref 32–75)
NITRITE UR QL STRIP.AUTO: NEGATIVE
NRBC # BLD: 0 K/UL (ref 0–0.01)
NRBC BLD-RTO: 0 PER 100 WBC
PH UR STRIP: 5.5 (ref 5–8)
PLATELET # BLD AUTO: 455 K/UL (ref 150–400)
PMV BLD AUTO: 10.2 FL (ref 8.9–12.9)
POTASSIUM SERPL-SCNC: 4.3 MMOL/L (ref 3.5–5.1)
PROT SERPL-MCNC: 7.4 G/DL (ref 6.4–8.2)
PROT UR STRIP-MCNC: ABNORMAL MG/DL
RBC # BLD AUTO: 4.53 M/UL (ref 3.8–5.2)
RBC #/AREA URNS HPF: ABNORMAL /HPF (ref 0–5)
SODIUM SERPL-SCNC: 133 MMOL/L (ref 136–145)
SP GR UR REFRACTOMETRY: 1.03
URINE CULTURE IF INDICATED: ABNORMAL
UROBILINOGEN UR QL STRIP.AUTO: 1 EU/DL (ref 0.2–1)
WBC # BLD AUTO: 9.3 K/UL (ref 3.6–11)
WBC URNS QL MICRO: ABNORMAL /HPF (ref 0–4)

## 2023-06-17 PROCEDURE — 96375 TX/PRO/DX INJ NEW DRUG ADDON: CPT

## 2023-06-17 PROCEDURE — 85025 COMPLETE CBC W/AUTO DIFF WBC: CPT

## 2023-06-17 PROCEDURE — 6360000002 HC RX W HCPCS: Performed by: EMERGENCY MEDICINE

## 2023-06-17 PROCEDURE — 96374 THER/PROPH/DIAG INJ IV PUSH: CPT

## 2023-06-17 PROCEDURE — 80053 COMPREHEN METABOLIC PANEL: CPT

## 2023-06-17 PROCEDURE — 6360000004 HC RX CONTRAST MEDICATION: Performed by: EMERGENCY MEDICINE

## 2023-06-17 PROCEDURE — 81001 URINALYSIS AUTO W/SCOPE: CPT

## 2023-06-17 PROCEDURE — 74177 CT ABD & PELVIS W/CONTRAST: CPT

## 2023-06-17 PROCEDURE — 83735 ASSAY OF MAGNESIUM: CPT

## 2023-06-17 PROCEDURE — 2580000003 HC RX 258: Performed by: EMERGENCY MEDICINE

## 2023-06-17 PROCEDURE — 99285 EMERGENCY DEPT VISIT HI MDM: CPT

## 2023-06-17 PROCEDURE — 36415 COLL VENOUS BLD VENIPUNCTURE: CPT

## 2023-06-17 RX ORDER — OXYCODONE HYDROCHLORIDE AND ACETAMINOPHEN 5; 325 MG/1; MG/1
1 TABLET ORAL EVERY 6 HOURS PRN
Qty: 12 TABLET | Refills: 0 | Status: SHIPPED | OUTPATIENT
Start: 2023-06-17 | End: 2023-06-20

## 2023-06-17 RX ORDER — 0.9 % SODIUM CHLORIDE 0.9 %
1000 INTRAVENOUS SOLUTION INTRAVENOUS ONCE
Status: COMPLETED | OUTPATIENT
Start: 2023-06-17 | End: 2023-06-17

## 2023-06-17 RX ORDER — KETOROLAC TROMETHAMINE 30 MG/ML
30 INJECTION, SOLUTION INTRAMUSCULAR; INTRAVENOUS
Status: COMPLETED | OUTPATIENT
Start: 2023-06-17 | End: 2023-06-17

## 2023-06-17 RX ORDER — DROPERIDOL 2.5 MG/ML
0.62 INJECTION, SOLUTION INTRAMUSCULAR; INTRAVENOUS EVERY 6 HOURS PRN
Status: DISCONTINUED | OUTPATIENT
Start: 2023-06-17 | End: 2023-06-17 | Stop reason: HOSPADM

## 2023-06-17 RX ORDER — ONDANSETRON 2 MG/ML
4 INJECTION INTRAMUSCULAR; INTRAVENOUS ONCE
Status: COMPLETED | OUTPATIENT
Start: 2023-06-17 | End: 2023-06-17

## 2023-06-17 RX ORDER — FENTANYL CITRATE 50 UG/ML
50 INJECTION, SOLUTION INTRAMUSCULAR; INTRAVENOUS
Status: COMPLETED | OUTPATIENT
Start: 2023-06-17 | End: 2023-06-17

## 2023-06-17 RX ADMIN — KETOROLAC TROMETHAMINE 30 MG: 30 INJECTION, SOLUTION INTRAMUSCULAR; INTRAVENOUS at 11:05

## 2023-06-17 RX ADMIN — IOPAMIDOL 100 ML: 755 INJECTION, SOLUTION INTRAVENOUS at 12:06

## 2023-06-17 RX ADMIN — ONDANSETRON 4 MG: 2 INJECTION INTRAMUSCULAR; INTRAVENOUS at 13:14

## 2023-06-17 RX ADMIN — DROPERIDOL 0.62 MG: 2.5 INJECTION, SOLUTION INTRAMUSCULAR; INTRAVENOUS at 11:05

## 2023-06-17 RX ADMIN — SODIUM CHLORIDE 1000 ML: 9 INJECTION, SOLUTION INTRAVENOUS at 11:05

## 2023-06-17 RX ADMIN — FENTANYL CITRATE 50 MCG: 50 INJECTION, SOLUTION INTRAMUSCULAR; INTRAVENOUS at 13:14

## 2023-06-17 ASSESSMENT — PAIN SCALES - GENERAL
PAINLEVEL_OUTOF10: 8
PAINLEVEL_OUTOF10: 7

## 2023-06-17 NOTE — ED PROVIDER NOTES
injection 30 mg (30 mg IntraVENous Given 6/17/23 1105)   iopamidol (ISOVUE-370) 76 % injection 100 mL (100 mLs IntraVENous Given 6/17/23 1206)   fentaNYL (SUBLIMAZE) injection 50 mcg (50 mcg IntraVENous Given 6/17/23 1314)   ondansetron (ZOFRAN) injection 4 mg (4 mg IntraVENous Given 6/17/23 1314)       Medical Decision Making  Amount and/or Complexity of Data Reviewed  Labs: ordered. Radiology: ordered. Risk  Prescription drug management. Patient's islet cell transplant performed at [facility]. Patient states secondary to that she did have diabetes and is currently on insulin pump. She states she has a history of gastroparesis which presents like this as well. We will obtain CBC, CMP, lipase, CT abdomen pelvis as well as urinalysis. Labs unremarkable urinalysis unremarkable CT shows possible partial small bowel obstruction. Will p.o. challenge. Following medications in addition to p.o. challenge patient is actually feeling better has not had any further vomiting. Patient would like to try and go home she was offered admission. She was given strict return instructions if she develops any vomiting or worsening pain    FINAL IMPRESSION     1. Nausea    2. Generalized abdominal pain          DISPOSITION/PLAN   Toby Ryan Fletcher's  results have been reviewed with her. She has been counseled regarding her diagnosis, treatment, and plan. She verbally conveys understanding and agreement of the signs, symptoms, diagnosis, treatment and prognosis and additionally agrees to follow up as discussed. She also agrees with the care-plan and conveys that all of her questions have been answered. I have also provided discharge instructions for her that include: educational information regarding their diagnosis and treatment, and list of reasons why they would want to return to the ED prior to their follow-up appointment, should her condition change.      CLINICAL IMPRESSION    Discharge Note: The patient is stable for

## 2023-06-27 ENCOUNTER — TELEPHONE (OUTPATIENT)
Dept: PRIMARY CARE | Facility: CLINIC | Age: 48
End: 2023-06-27
Payer: COMMERCIAL

## 2023-06-27 DIAGNOSIS — R92.8 ABNORMAL MAMMOGRAM OF LEFT BREAST: ICD-10-CM

## 2023-06-27 NOTE — TELEPHONE ENCOUNTER
----- Message from Oswaldo Dunaway MD sent at 6/27/2023  7:56 AM EDT -----  Please inform the patient that her mammogram showed some small calcifications in the left breast at the right breast was completely normal.  The radiologist would like to get additional views of the left breast to make sure that these calcifications are not dangerous.  Please order a left diagnostic mammogram.  Tell the patient that I would like her to get this in the next few weeks and we will call her with results.

## 2023-06-27 NOTE — RESULT ENCOUNTER NOTE
Please inform the patient that her mammogram showed some small calcifications in the left breast at the right breast was completely normal.  The radiologist would like to get additional views of the left breast to make sure that these calcifications are not dangerous.  Please order a left diagnostic mammogram.  Tell the patient that I would like her to get this in the next few weeks and we will call her with results.

## 2023-07-17 ENCOUNTER — TELEPHONE (OUTPATIENT)
Dept: PRIMARY CARE | Facility: CLINIC | Age: 48
End: 2023-07-17
Payer: COMMERCIAL

## 2023-07-17 NOTE — TELEPHONE ENCOUNTER
----- Message from Oswaldo Dunaway MD sent at 7/17/2023  7:09 AM EDT -----  The patient's recent mammogram revealed areas in the left breast with abnormal calcifications.  They recommend a stereotactic biopsy.  It appears this was already discussed with her by the radiologist.  Does she have an appointment set up for a biopsy or consultation with the breast specialist?  Please let me know.

## 2023-07-17 NOTE — RESULT ENCOUNTER NOTE
The patient's recent mammogram revealed areas in the left breast with abnormal calcifications.  They recommend a stereotactic biopsy.  It appears this was already discussed with her by the radiologist.  Does she have an appointment set up for a biopsy or consultation with the breast specialist?  Please let me know.

## 2023-07-20 ENCOUNTER — LAB (OUTPATIENT)
Dept: LAB | Facility: LAB | Age: 48
End: 2023-07-20
Payer: COMMERCIAL

## 2023-07-20 DIAGNOSIS — Z00.00 ENCOUNTER FOR PREVENTIVE HEALTH EXAMINATION: ICD-10-CM

## 2023-07-20 LAB
ALANINE AMINOTRANSFERASE (SGPT) (U/L) IN SER/PLAS: 16 U/L (ref 7–45)
ALBUMIN (G/DL) IN SER/PLAS: 4.4 G/DL (ref 3.4–5)
ALKALINE PHOSPHATASE (U/L) IN SER/PLAS: 39 U/L (ref 33–110)
ANION GAP IN SER/PLAS: 12 MMOL/L (ref 10–20)
ASPARTATE AMINOTRANSFERASE (SGOT) (U/L) IN SER/PLAS: 17 U/L (ref 9–39)
BILIRUBIN TOTAL (MG/DL) IN SER/PLAS: 0.9 MG/DL (ref 0–1.2)
CALCIDIOL (25 OH VITAMIN D3) (NG/ML) IN SER/PLAS: 50 NG/ML
CALCIUM (MG/DL) IN SER/PLAS: 8.9 MG/DL (ref 8.6–10.3)
CARBON DIOXIDE, TOTAL (MMOL/L) IN SER/PLAS: 28 MMOL/L (ref 21–32)
CHLORIDE (MMOL/L) IN SER/PLAS: 103 MMOL/L (ref 98–107)
CHOLESTEROL (MG/DL) IN SER/PLAS: 160 MG/DL (ref 0–199)
CHOLESTEROL IN HDL (MG/DL) IN SER/PLAS: 66.2 MG/DL
CHOLESTEROL/HDL RATIO: 2.4
CREATININE (MG/DL) IN SER/PLAS: 0.76 MG/DL (ref 0.5–1.05)
ERYTHROCYTE DISTRIBUTION WIDTH (RATIO) BY AUTOMATED COUNT: 13 % (ref 11.5–14.5)
ERYTHROCYTE MEAN CORPUSCULAR HEMOGLOBIN CONCENTRATION (G/DL) BY AUTOMATED: 33.4 G/DL (ref 32–36)
ERYTHROCYTE MEAN CORPUSCULAR VOLUME (FL) BY AUTOMATED COUNT: 89 FL (ref 80–100)
ERYTHROCYTES (10*6/UL) IN BLOOD BY AUTOMATED COUNT: 4.45 X10E12/L (ref 4–5.2)
GFR FEMALE: >90 ML/MIN/1.73M2
GLUCOSE (MG/DL) IN SER/PLAS: 81 MG/DL (ref 74–99)
HEMATOCRIT (%) IN BLOOD BY AUTOMATED COUNT: 39.5 % (ref 36–46)
HEMOGLOBIN (G/DL) IN BLOOD: 13.2 G/DL (ref 12–16)
LDL: 80 MG/DL (ref 0–99)
LEUKOCYTES (10*3/UL) IN BLOOD BY AUTOMATED COUNT: 5.1 X10E9/L (ref 4.4–11.3)
PLATELETS (10*3/UL) IN BLOOD AUTOMATED COUNT: 135 X10E9/L (ref 150–450)
POTASSIUM (MMOL/L) IN SER/PLAS: 4 MMOL/L (ref 3.5–5.3)
PROTEIN TOTAL: 6.6 G/DL (ref 6.4–8.2)
SODIUM (MMOL/L) IN SER/PLAS: 139 MMOL/L (ref 136–145)
THYROTROPIN (MIU/L) IN SER/PLAS BY DETECTION LIMIT <= 0.05 MIU/L: 1.95 MIU/L (ref 0.44–3.98)
TRIGLYCERIDE (MG/DL) IN SER/PLAS: 70 MG/DL (ref 0–149)
UREA NITROGEN (MG/DL) IN SER/PLAS: 9 MG/DL (ref 6–23)
VLDL: 14 MG/DL (ref 0–40)

## 2023-07-20 PROCEDURE — 85027 COMPLETE CBC AUTOMATED: CPT

## 2023-07-20 PROCEDURE — 36415 COLL VENOUS BLD VENIPUNCTURE: CPT

## 2023-07-20 PROCEDURE — 80061 LIPID PANEL: CPT

## 2023-07-20 PROCEDURE — 82306 VITAMIN D 25 HYDROXY: CPT

## 2023-07-20 PROCEDURE — 84443 ASSAY THYROID STIM HORMONE: CPT

## 2023-07-20 PROCEDURE — 80053 COMPREHEN METABOLIC PANEL: CPT

## 2023-07-20 NOTE — TELEPHONE ENCOUNTER
LM with pt to inform us if she has spoken with someone regarding her results or not. Will update when pt calls back

## 2023-07-21 NOTE — TELEPHONE ENCOUNTER
----- Message from Oswaldo Dunaway MD sent at 7/21/2023  9:32 AM EDT -----  Inform patient of normal results of all recent labs.   Even though some of the lab values may fall outside of the normal range, I do not feel they are clinically concerning or relevant at this time.

## 2023-08-22 LAB
ANION GAP IN SER/PLAS: 11 MMOL/L (ref 10–20)
CALCIUM (MG/DL) IN SER/PLAS: 9.8 MG/DL (ref 8.6–10.3)
CARBON DIOXIDE, TOTAL (MMOL/L) IN SER/PLAS: 27 MMOL/L (ref 21–32)
CHLORIDE (MMOL/L) IN SER/PLAS: 104 MMOL/L (ref 98–107)
CREATININE (MG/DL) IN SER/PLAS: 0.85 MG/DL (ref 0.5–1.05)
ERYTHROCYTE DISTRIBUTION WIDTH (RATIO) BY AUTOMATED COUNT: 13.2 % (ref 11.5–14.5)
ERYTHROCYTE MEAN CORPUSCULAR HEMOGLOBIN CONCENTRATION (G/DL) BY AUTOMATED: 34 G/DL (ref 32–36)
ERYTHROCYTE MEAN CORPUSCULAR VOLUME (FL) BY AUTOMATED COUNT: 89 FL (ref 80–100)
ERYTHROCYTES (10*6/UL) IN BLOOD BY AUTOMATED COUNT: 4.37 X10E12/L (ref 4–5.2)
GFR FEMALE: 84 ML/MIN/1.73M2
GLUCOSE (MG/DL) IN SER/PLAS: 88 MG/DL (ref 74–99)
HEMATOCRIT (%) IN BLOOD BY AUTOMATED COUNT: 38.8 % (ref 36–46)
HEMOGLOBIN (G/DL) IN BLOOD: 13.2 G/DL (ref 12–16)
LEUKOCYTES (10*3/UL) IN BLOOD BY AUTOMATED COUNT: 6.5 X10E9/L (ref 4.4–11.3)
PLATELETS (10*3/UL) IN BLOOD AUTOMATED COUNT: 135 X10E9/L (ref 150–450)
POTASSIUM (MMOL/L) IN SER/PLAS: 5.1 MMOL/L (ref 3.5–5.3)
SODIUM (MMOL/L) IN SER/PLAS: 137 MMOL/L (ref 136–145)
UREA NITROGEN (MG/DL) IN SER/PLAS: 9 MG/DL (ref 6–23)

## 2023-09-01 ENCOUNTER — HOSPITAL ENCOUNTER (OUTPATIENT)
Dept: DATA CONVERSION | Facility: HOSPITAL | Age: 48
End: 2023-09-01
Attending: SURGERY | Admitting: SURGERY
Payer: COMMERCIAL

## 2023-09-01 DIAGNOSIS — D05.12 INTRADUCTAL CARCINOMA IN SITU OF LEFT BREAST: ICD-10-CM

## 2023-09-01 LAB — HCG, URINE: NEGATIVE

## 2023-09-08 LAB
COMPLETE PATHOLOGY REPORT: NORMAL
CONVERTED CLINICAL DIAGNOSIS-HISTORY: NORMAL
CONVERTED FINAL DIAGNOSIS: NORMAL
CONVERTED FINAL REPORT PDF LINK TO COPY AND PASTE: NORMAL
CONVERTED GROSS DESCRIPTION: NORMAL
CONVERTED SYNOPTIC DIAGNOSIS: NORMAL

## 2023-09-29 ENCOUNTER — HOSPITAL ENCOUNTER (OUTPATIENT)
Dept: RADIATION ONCOLOGY | Facility: CLINIC | Age: 48
Setting detail: RADIATION/ONCOLOGY SERIES
Discharge: STILL ADMITTED OR ADMINISTRATIVE | End: 2023-09-29
Payer: COMMERCIAL

## 2023-09-29 VITALS — BODY MASS INDEX: 22.04 KG/M2 | WEIGHT: 137.13 LBS | HEIGHT: 66 IN

## 2023-09-30 NOTE — H&P
"    History & Physical Reviewed:   Pregnant/Lactating:  ·  Are You Pregnant no (1)   ·  Are You Currently Breastfeeding no (1)     I have reviewed the History and Physical dated:  07-Aug-2023   History and Physical reviewed and relevant findings noted. Patient examined to review pertinent physical  findings.: No significant changes   Home Medications Reviewed: no changes noted   Allergies Reviewed: no changes noted       ERAS (Enhanced Recovery After Surgery):  ·  ERAS Patient: no     Consent:   COVID-19 Consent:  ·  COVID-19 Risk Consent Surgeon has reviewed key risks related to the risk of crystal COVID-19 and if they contract COVID-19 what the risks are.       Electronic Signatures:  Shelly Martínez ()  (Signed 01-Sep-2023 11:53)   Authored: History & Physical Reviewed, ERAS, Consent,  Note Completion      Last Updated: 01-Sep-2023 11:53 by Shelly Martínez ()    References:  1.  Data Referenced From \"Patient Profile - Preop v3\" 01-Sep-2023 11:10   "

## 2023-10-01 NOTE — OP NOTE
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 LEFT MAG SEED LOCALIZED PARTIAL MASTECTOMY    Anesthesia: No anesthesiologist associated with this case  Estimated Blood Loss: 5ml  Findings: 1. clip, seed present on specimen xray        Operative Report:     The patient underwent pre-operative magnetic seed localization in the radiology department prior to surgery.  Localization studies were reviewed confirming appropriate localization. The patient was identified by name and birth date and the operative site  was marked. The patient was then transported to the operative suite. A sign-in was performed verifying patient identity, procedure and laterality.  One dose of preoperative antibiotics was given.  After induction of anesthesia the left breast and axilla  were prepped and draped in the usual sterile fashion.   Just prior to incision, a time-out was performed confirming patient identity, procedure and laterality. The sentimag probe was used to identify the location of the targeted lesion and marked on the  skin.  A periareolar incision was made, and flaps were raised in the direction of the targeted lesion.  The target lesion was then circumferentially dissected using electrocautery.  Circumferential dissection was carried out to include the targeted lesion  and localization seed.  The probe was used to again confirm the presence of the localization seed within the specimen. Once the specimen was completed dissected it was oriented with a silk suture- short suture superior, long suture lateral and passed  off the field.  A specimen radiograph was performed which confirmed the presence of the lesion, biopsy clip, and localization seed.   Six additional cavity margins were taken: superior, inferior, medial, lateral, anterior and posterior with a clip denoting  the new margin.  The posterior extent of dissection did include pectoralis fascia. Clips were placed to denote the area of resection. Next, meticulous hemostasis was achieved.   In order to close the cavity and decrease the cosmetic defect and thereby  improve aesthetic outcome, a tissue transfer procedure was performed.  Using electrocautery, separate tissue incisions were made on the anterior and posterior aspects.  Flaps were raised in the medial, lateral, inferior and superior locations.  These  flaps were then elevated for a total area of 20 square centimeters.  The flaps were then rotated and reapproximated in multiple layers to recreate the breast mound.  The dermis was closed with 3-0 Vicryl suture and the skin was closed with a running 4-0  Monocryl.  Benzoin and steri-strips were used as dressing.  The patient was then awoken from anesthesia and transported to the PACU in satisfactory condition.    SPECIMEN:    1. left magseed localized partial mastectomy short suture superior, long lateral  2. new superior margin  3. new inferior margin  4. new medial margin  5. new lateral margin  6. new anterior margin  7. new posterior margin                        Attestation:   Note Completion:  Attending Attestation I performed the procedure without a resident         Electronic Signatures:  Shelly Martínez)  (Signed 01-Sep-2023 13:46)   Authored: Post-Operative Note, Chart Review, Note Completion      Last Updated: 01-Sep-2023 13:46 by Shelly Martínez ()

## 2023-10-02 ENCOUNTER — HOSPITAL ENCOUNTER (OUTPATIENT)
Dept: RADIATION ONCOLOGY | Facility: CLINIC | Age: 48
Setting detail: RADIATION/ONCOLOGY SERIES
Discharge: STILL ADMITTED OR ADMINISTRATIVE | End: 2023-10-02
Payer: COMMERCIAL

## 2023-10-02 DIAGNOSIS — D05.12 INTRADUCTAL CARCINOMA IN SITU OF LEFT BREAST: ICD-10-CM

## 2023-10-02 LAB
RAD ONC MSQ ACTUAL FRACTIONS DELIVERED: 4
RAD ONC MSQ ACTUAL SESSION DELIVERED DOSE: 267 CGRAY
RAD ONC MSQ ACTUAL TOTAL DOSE: 1068 CGRAY
RAD ONC MSQ ELAPSED DAYS: 5
RAD ONC MSQ LAST DATE: NORMAL
RAD ONC MSQ PRESCRIBED FRACTIONAL DOSE: 267 CGRAY
RAD ONC MSQ PRESCRIBED NUMBER OF FRACTIONS: 15
RAD ONC MSQ PRESCRIBED TECHNIQUE: NORMAL
RAD ONC MSQ PRESCRIBED TOTAL DOSE: 4005 CGRAY
RAD ONC MSQ PRESCRIPTION PATTERN COMMENT: NORMAL
RAD ONC MSQ START DATE: NORMAL
RAD ONC MSQ TREATMENT COURSE NUMBER: 1
RAD ONC MSQ TREATMENT SITE: NORMAL

## 2023-10-02 PROCEDURE — 77412 RADIATION TX DELIVERY LVL 3: CPT | Performed by: RADIOLOGY

## 2023-10-03 ENCOUNTER — HOSPITAL ENCOUNTER (OUTPATIENT)
Dept: RADIATION ONCOLOGY | Facility: CLINIC | Age: 48
Setting detail: RADIATION/ONCOLOGY SERIES
Discharge: STILL ADMITTED OR ADMINISTRATIVE | End: 2023-10-03
Payer: COMMERCIAL

## 2023-10-03 DIAGNOSIS — D05.12 INTRADUCTAL CARCINOMA IN SITU OF LEFT BREAST: ICD-10-CM

## 2023-10-03 DIAGNOSIS — Z51.0 ENCOUNTER FOR ANTINEOPLASTIC RADIATION THERAPY: ICD-10-CM

## 2023-10-03 PROBLEM — H52.4 PRESBYOPIA OF BOTH EYES: Status: ACTIVE | Noted: 2019-11-12

## 2023-10-03 PROBLEM — N81.89 PELVIC FLOOR WEAKNESS: Status: ACTIVE | Noted: 2018-10-04

## 2023-10-03 PROBLEM — R92.8 ABNORMAL FINDING ON BREAST IMAGING: Status: ACTIVE | Noted: 2023-10-03

## 2023-10-03 PROBLEM — N39.3 STRESS INCONTINENCE: Status: ACTIVE | Noted: 2018-10-04

## 2023-10-03 PROBLEM — H52.223 REGULAR ASTIGMATISM OF BOTH EYES: Status: ACTIVE | Noted: 2019-11-12

## 2023-10-03 LAB
RAD ONC MSQ ACTUAL FRACTIONS DELIVERED: 5
RAD ONC MSQ ACTUAL SESSION DELIVERED DOSE: 267 CGRAY
RAD ONC MSQ ACTUAL TOTAL DOSE: 1335 CGRAY
RAD ONC MSQ ELAPSED DAYS: 6
RAD ONC MSQ LAST DATE: NORMAL
RAD ONC MSQ PRESCRIBED FRACTIONAL DOSE: 267 CGRAY
RAD ONC MSQ PRESCRIBED NUMBER OF FRACTIONS: 15
RAD ONC MSQ PRESCRIBED TECHNIQUE: NORMAL
RAD ONC MSQ PRESCRIBED TOTAL DOSE: 4005 CGRAY
RAD ONC MSQ PRESCRIPTION PATTERN COMMENT: NORMAL
RAD ONC MSQ START DATE: NORMAL
RAD ONC MSQ TREATMENT COURSE NUMBER: 1
RAD ONC MSQ TREATMENT SITE: NORMAL

## 2023-10-03 PROCEDURE — 77427 RADIATION TX MANAGEMENT X5: CPT | Performed by: RADIOLOGY

## 2023-10-03 PROCEDURE — 77412 RADIATION TX DELIVERY LVL 3: CPT | Performed by: RADIOLOGY

## 2023-10-03 RX ORDER — NEOMYCIN SULFATE, POLYMYXIN B SULFATE AND DEXAMETHASONE 3.5; 10000; 1 MG/ML; [USP'U]/ML; MG/ML
1 SUSPENSION/ DROPS OPHTHALMIC 4 TIMES DAILY
COMMUNITY
Start: 2023-09-18 | End: 2023-10-25 | Stop reason: ALTCHOICE

## 2023-10-03 RX ORDER — MULTIVITAMIN
1 TABLET ORAL DAILY
COMMUNITY
Start: 2010-04-07

## 2023-10-03 NOTE — PROGRESS NOTES
Radiation Oncology On Treatment Visit    Patient Name:  Meryl Loaiza  MRN:  [id number]  :  1975    Referring Provider: No ref. provider found  Primary Care Provider: Oswaldo Dunaway MD  Care Team: Patient Care Team:  Oswaldo Dunaway MD as PCP - General    Date of Service: 10/3/2023     Diagnosis:   Specialty Problems    None    Treatment Summary: L breast 5 fx 1335 cGy of planned 42.56 Gy 6 elapsed days    SUBJECTIVE: Patient tolerating radiation well with minimal side effects. C/o fatigue encouraging to stay active.  Ibuprofen for inflammation. Skin check scheduled 23.  Tender and fatigued    OBJECTIVE:   No skin reaction  Pain Scale: The patient's current pain level was assessed.  They report currently having a pain of 0 out of 10.    Other Pertinent Findings:       Assessment / Plan:  The patient is tolerating radiation therapy as anticipated.  Continue per current treatment plan.   Ibuprofen for inflammation/tenderness.

## 2023-10-04 ENCOUNTER — HOSPITAL ENCOUNTER (OUTPATIENT)
Dept: RADIATION ONCOLOGY | Facility: CLINIC | Age: 48
Setting detail: RADIATION/ONCOLOGY SERIES
Discharge: STILL ADMITTED OR ADMINISTRATIVE | End: 2023-10-04
Payer: COMMERCIAL

## 2023-10-04 DIAGNOSIS — D05.12 INTRADUCTAL CARCINOMA IN SITU OF LEFT BREAST: ICD-10-CM

## 2023-10-04 DIAGNOSIS — Z51.0 ENCOUNTER FOR ANTINEOPLASTIC RADIATION THERAPY: ICD-10-CM

## 2023-10-04 LAB
RAD ONC MSQ ACTUAL FRACTIONS DELIVERED: 6
RAD ONC MSQ ACTUAL SESSION DELIVERED DOSE: 267 CGRAY
RAD ONC MSQ ACTUAL TOTAL DOSE: 1602 CGRAY
RAD ONC MSQ ELAPSED DAYS: 7
RAD ONC MSQ LAST DATE: NORMAL
RAD ONC MSQ PRESCRIBED FRACTIONAL DOSE: 267 CGRAY
RAD ONC MSQ PRESCRIBED NUMBER OF FRACTIONS: 15
RAD ONC MSQ PRESCRIBED TECHNIQUE: NORMAL
RAD ONC MSQ PRESCRIBED TOTAL DOSE: 4005 CGRAY
RAD ONC MSQ PRESCRIPTION PATTERN COMMENT: NORMAL
RAD ONC MSQ START DATE: NORMAL
RAD ONC MSQ TREATMENT COURSE NUMBER: 1
RAD ONC MSQ TREATMENT SITE: NORMAL

## 2023-10-04 PROCEDURE — 77417 THER RADIOLOGY PORT IMAGE(S): CPT | Performed by: RADIOLOGY

## 2023-10-04 PROCEDURE — 77412 RADIATION TX DELIVERY LVL 3: CPT | Performed by: RADIOLOGY

## 2023-10-04 PROCEDURE — 77336 RADIATION PHYSICS CONSULT: CPT | Performed by: RADIOLOGY

## 2023-10-05 ENCOUNTER — HOSPITAL ENCOUNTER (OUTPATIENT)
Dept: RADIATION ONCOLOGY | Facility: CLINIC | Age: 48
Setting detail: RADIATION/ONCOLOGY SERIES
Discharge: STILL ADMITTED OR ADMINISTRATIVE | End: 2023-10-05
Payer: COMMERCIAL

## 2023-10-05 ENCOUNTER — LAB (OUTPATIENT)
Dept: LAB | Facility: LAB | Age: 48
End: 2023-10-05
Payer: COMMERCIAL

## 2023-10-05 DIAGNOSIS — D05.12 INTRADUCTAL CARCINOMA IN SITU OF LEFT BREAST: ICD-10-CM

## 2023-10-05 DIAGNOSIS — C50.912 MALIGNANT NEOPLASM OF UNSPECIFIED SITE OF LEFT FEMALE BREAST (MULTI): Primary | ICD-10-CM

## 2023-10-05 DIAGNOSIS — Z51.0 ENCOUNTER FOR ANTINEOPLASTIC RADIATION THERAPY: ICD-10-CM

## 2023-10-05 DIAGNOSIS — C50.912 MALIGNANT NEOPLASM OF UNSPECIFIED SITE OF LEFT FEMALE BREAST (MULTI): ICD-10-CM

## 2023-10-05 LAB
ESTRADIOL SERPL-MCNC: 23 PG/ML
RAD ONC MSQ ACTUAL FRACTIONS DELIVERED: 7
RAD ONC MSQ ACTUAL SESSION DELIVERED DOSE: 267 CGRAY
RAD ONC MSQ ACTUAL TOTAL DOSE: 1869 CGRAY
RAD ONC MSQ ELAPSED DAYS: 8
RAD ONC MSQ LAST DATE: NORMAL
RAD ONC MSQ PRESCRIBED FRACTIONAL DOSE: 267 CGRAY
RAD ONC MSQ PRESCRIBED NUMBER OF FRACTIONS: 15
RAD ONC MSQ PRESCRIBED TECHNIQUE: NORMAL
RAD ONC MSQ PRESCRIBED TOTAL DOSE: 4005 CGRAY
RAD ONC MSQ PRESCRIPTION PATTERN COMMENT: NORMAL
RAD ONC MSQ START DATE: NORMAL
RAD ONC MSQ TREATMENT COURSE NUMBER: 1
RAD ONC MSQ TREATMENT SITE: NORMAL

## 2023-10-05 PROCEDURE — 83001 ASSAY OF GONADOTROPIN (FSH): CPT

## 2023-10-05 PROCEDURE — 83002 ASSAY OF GONADOTROPIN (LH): CPT

## 2023-10-05 PROCEDURE — 77412 RADIATION TX DELIVERY LVL 3: CPT | Performed by: RADIOLOGY

## 2023-10-05 PROCEDURE — 36415 COLL VENOUS BLD VENIPUNCTURE: CPT

## 2023-10-05 PROCEDURE — 82670 ASSAY OF TOTAL ESTRADIOL: CPT

## 2023-10-06 ENCOUNTER — HOSPITAL ENCOUNTER (OUTPATIENT)
Dept: RADIATION ONCOLOGY | Facility: CLINIC | Age: 48
Setting detail: RADIATION/ONCOLOGY SERIES
Discharge: STILL ADMITTED OR ADMINISTRATIVE | End: 2023-10-06
Payer: COMMERCIAL

## 2023-10-06 DIAGNOSIS — Z51.0 ENCOUNTER FOR ANTINEOPLASTIC RADIATION THERAPY: ICD-10-CM

## 2023-10-06 DIAGNOSIS — D05.12 INTRADUCTAL CARCINOMA IN SITU OF LEFT BREAST: ICD-10-CM

## 2023-10-06 LAB
FSH SERPL-ACNC: 97.6 IU/L
LH SERPL-ACNC: 54.3 IU/L
RAD ONC MSQ ACTUAL FRACTIONS DELIVERED: 8
RAD ONC MSQ ACTUAL SESSION DELIVERED DOSE: 267 CGRAY
RAD ONC MSQ ACTUAL TOTAL DOSE: 2136 CGRAY
RAD ONC MSQ ELAPSED DAYS: 9
RAD ONC MSQ LAST DATE: NORMAL
RAD ONC MSQ PRESCRIBED FRACTIONAL DOSE: 267 CGRAY
RAD ONC MSQ PRESCRIBED NUMBER OF FRACTIONS: 15
RAD ONC MSQ PRESCRIBED TECHNIQUE: NORMAL
RAD ONC MSQ PRESCRIBED TOTAL DOSE: 4005 CGRAY
RAD ONC MSQ PRESCRIPTION PATTERN COMMENT: NORMAL
RAD ONC MSQ START DATE: NORMAL
RAD ONC MSQ TREATMENT COURSE NUMBER: 1
RAD ONC MSQ TREATMENT SITE: NORMAL

## 2023-10-06 PROCEDURE — 77412 RADIATION TX DELIVERY LVL 3: CPT | Performed by: RADIOLOGY

## 2023-10-09 ENCOUNTER — HOSPITAL ENCOUNTER (OUTPATIENT)
Dept: RADIATION ONCOLOGY | Facility: CLINIC | Age: 48
Setting detail: RADIATION/ONCOLOGY SERIES
Discharge: STILL ADMITTED OR ADMINISTRATIVE | End: 2023-10-09
Payer: COMMERCIAL

## 2023-10-09 DIAGNOSIS — D05.12 INTRADUCTAL CARCINOMA IN SITU OF LEFT BREAST: ICD-10-CM

## 2023-10-09 DIAGNOSIS — Z51.0 ENCOUNTER FOR ANTINEOPLASTIC RADIATION THERAPY: ICD-10-CM

## 2023-10-09 LAB
RAD ONC MSQ ACTUAL FRACTIONS DELIVERED: 9
RAD ONC MSQ ACTUAL SESSION DELIVERED DOSE: 267 CGRAY
RAD ONC MSQ ACTUAL TOTAL DOSE: 2403 CGRAY
RAD ONC MSQ ELAPSED DAYS: 12
RAD ONC MSQ LAST DATE: NORMAL
RAD ONC MSQ PRESCRIBED FRACTIONAL DOSE: 267 CGRAY
RAD ONC MSQ PRESCRIBED NUMBER OF FRACTIONS: 15
RAD ONC MSQ PRESCRIBED TECHNIQUE: NORMAL
RAD ONC MSQ PRESCRIBED TOTAL DOSE: 4005 CGRAY
RAD ONC MSQ PRESCRIPTION PATTERN COMMENT: NORMAL
RAD ONC MSQ START DATE: NORMAL
RAD ONC MSQ TREATMENT COURSE NUMBER: 1
RAD ONC MSQ TREATMENT SITE: NORMAL

## 2023-10-09 PROCEDURE — 77412 RADIATION TX DELIVERY LVL 3: CPT | Performed by: RADIOLOGY

## 2023-10-10 ENCOUNTER — HOSPITAL ENCOUNTER (OUTPATIENT)
Dept: RADIATION ONCOLOGY | Facility: CLINIC | Age: 48
Setting detail: RADIATION/ONCOLOGY SERIES
Discharge: STILL ADMITTED OR ADMINISTRATIVE | End: 2023-10-10
Payer: COMMERCIAL

## 2023-10-10 ENCOUNTER — RADIATION ONCOLOGY OTV (OUTPATIENT)
Dept: RADIATION ONCOLOGY | Facility: CLINIC | Age: 48
End: 2023-10-10
Payer: COMMERCIAL

## 2023-10-10 VITALS — BODY MASS INDEX: 23.11 KG/M2 | WEIGHT: 142.8 LBS

## 2023-10-10 DIAGNOSIS — R82.998 DARK URINE: Primary | ICD-10-CM

## 2023-10-10 DIAGNOSIS — Z51.0 ENCOUNTER FOR ANTINEOPLASTIC RADIATION THERAPY: ICD-10-CM

## 2023-10-10 DIAGNOSIS — D05.12 INTRADUCTAL CARCINOMA IN SITU OF LEFT BREAST: ICD-10-CM

## 2023-10-10 LAB
HOLD SPECIMEN: NORMAL
RAD ONC MSQ ACTUAL FRACTIONS DELIVERED: 10
RAD ONC MSQ ACTUAL SESSION DELIVERED DOSE: 267 CGRAY
RAD ONC MSQ ACTUAL TOTAL DOSE: 2670 CGRAY
RAD ONC MSQ ELAPSED DAYS: 13
RAD ONC MSQ LAST DATE: NORMAL
RAD ONC MSQ PRESCRIBED FRACTIONAL DOSE: 267 CGRAY
RAD ONC MSQ PRESCRIBED NUMBER OF FRACTIONS: 15
RAD ONC MSQ PRESCRIBED TECHNIQUE: NORMAL
RAD ONC MSQ PRESCRIBED TOTAL DOSE: 4005 CGRAY
RAD ONC MSQ PRESCRIPTION PATTERN COMMENT: NORMAL
RAD ONC MSQ START DATE: NORMAL
RAD ONC MSQ TREATMENT COURSE NUMBER: 1
RAD ONC MSQ TREATMENT SITE: NORMAL

## 2023-10-10 PROCEDURE — 77427 RADIATION TX MANAGEMENT X5: CPT | Performed by: RADIOLOGY

## 2023-10-10 PROCEDURE — 77412 RADIATION TX DELIVERY LVL 3: CPT | Performed by: RADIOLOGY

## 2023-10-10 PROCEDURE — 87086 URINE CULTURE/COLONY COUNT: CPT | Performed by: RADIOLOGY

## 2023-10-10 NOTE — ADDENDUM NOTE
Encounter addended by: Leila Banda MD on: 10/10/2023 12:26 PM   Actions taken: Visit diagnoses modified, Order list changed, Diagnosis association updated

## 2023-10-11 ENCOUNTER — HOSPITAL ENCOUNTER (OUTPATIENT)
Dept: RADIATION ONCOLOGY | Facility: CLINIC | Age: 48
Setting detail: RADIATION/ONCOLOGY SERIES
Discharge: STILL ADMITTED OR ADMINISTRATIVE | End: 2023-10-11
Payer: COMMERCIAL

## 2023-10-11 DIAGNOSIS — D05.12 INTRADUCTAL CARCINOMA IN SITU OF LEFT BREAST: ICD-10-CM

## 2023-10-11 DIAGNOSIS — Z51.0 ENCOUNTER FOR ANTINEOPLASTIC RADIATION THERAPY: ICD-10-CM

## 2023-10-11 LAB
RAD ONC MSQ ACTUAL FRACTIONS DELIVERED: 11
RAD ONC MSQ ACTUAL SESSION DELIVERED DOSE: 267 CGRAY
RAD ONC MSQ ACTUAL TOTAL DOSE: 2937 CGRAY
RAD ONC MSQ ELAPSED DAYS: 14
RAD ONC MSQ LAST DATE: NORMAL
RAD ONC MSQ PRESCRIBED FRACTIONAL DOSE: 267 CGRAY
RAD ONC MSQ PRESCRIBED NUMBER OF FRACTIONS: 15
RAD ONC MSQ PRESCRIBED TECHNIQUE: NORMAL
RAD ONC MSQ PRESCRIBED TOTAL DOSE: 4005 CGRAY
RAD ONC MSQ PRESCRIPTION PATTERN COMMENT: NORMAL
RAD ONC MSQ START DATE: NORMAL
RAD ONC MSQ TREATMENT COURSE NUMBER: 1
RAD ONC MSQ TREATMENT SITE: NORMAL

## 2023-10-11 PROCEDURE — 77336 RADIATION PHYSICS CONSULT: CPT | Performed by: RADIOLOGY

## 2023-10-11 PROCEDURE — 77412 RADIATION TX DELIVERY LVL 3: CPT | Performed by: RADIOLOGY

## 2023-10-11 PROCEDURE — 77417 THER RADIOLOGY PORT IMAGE(S): CPT | Performed by: RADIOLOGY

## 2023-10-12 ENCOUNTER — HOSPITAL ENCOUNTER (OUTPATIENT)
Dept: RADIATION ONCOLOGY | Facility: CLINIC | Age: 48
Setting detail: RADIATION/ONCOLOGY SERIES
Discharge: STILL ADMITTED OR ADMINISTRATIVE | End: 2023-10-12
Payer: COMMERCIAL

## 2023-10-12 PROCEDURE — 77412 RADIATION TX DELIVERY LVL 3: CPT | Performed by: RADIOLOGY

## 2023-10-13 ENCOUNTER — HOSPITAL ENCOUNTER (OUTPATIENT)
Dept: RADIATION ONCOLOGY | Facility: CLINIC | Age: 48
Setting detail: RADIATION/ONCOLOGY SERIES
Discharge: STILL ADMITTED OR ADMINISTRATIVE | End: 2023-10-13
Payer: COMMERCIAL

## 2023-10-13 DIAGNOSIS — Z51.0 ENCOUNTER FOR ANTINEOPLASTIC RADIATION THERAPY: ICD-10-CM

## 2023-10-13 DIAGNOSIS — D05.12 INTRADUCTAL CARCINOMA IN SITU OF LEFT BREAST: ICD-10-CM

## 2023-10-13 DIAGNOSIS — R82.998 DARK URINE: ICD-10-CM

## 2023-10-13 DIAGNOSIS — R82.998 DARK URINE: Primary | ICD-10-CM

## 2023-10-13 LAB
RAD ONC MSQ ACTUAL FRACTIONS DELIVERED: 13
RAD ONC MSQ ACTUAL SESSION DELIVERED DOSE: 267 CGRAY
RAD ONC MSQ ACTUAL TOTAL DOSE: 3471 CGRAY
RAD ONC MSQ ELAPSED DAYS: 16
RAD ONC MSQ LAST DATE: NORMAL
RAD ONC MSQ PRESCRIBED FRACTIONAL DOSE: 267 CGRAY
RAD ONC MSQ PRESCRIBED NUMBER OF FRACTIONS: 15
RAD ONC MSQ PRESCRIBED TECHNIQUE: NORMAL
RAD ONC MSQ PRESCRIBED TOTAL DOSE: 4005 CGRAY
RAD ONC MSQ PRESCRIPTION PATTERN COMMENT: NORMAL
RAD ONC MSQ START DATE: NORMAL
RAD ONC MSQ TREATMENT COURSE NUMBER: 1
RAD ONC MSQ TREATMENT SITE: NORMAL

## 2023-10-13 PROCEDURE — 77412 RADIATION TX DELIVERY LVL 3: CPT | Performed by: RADIOLOGY

## 2023-10-14 LAB — BACTERIA UR CULT: ABNORMAL

## 2023-10-16 ENCOUNTER — HOSPITAL ENCOUNTER (OUTPATIENT)
Dept: RADIATION ONCOLOGY | Facility: CLINIC | Age: 48
Setting detail: RADIATION/ONCOLOGY SERIES
Discharge: STILL ADMITTED OR ADMINISTRATIVE | End: 2023-10-16
Payer: COMMERCIAL

## 2023-10-16 DIAGNOSIS — D05.12 INTRADUCTAL CARCINOMA IN SITU OF LEFT BREAST: ICD-10-CM

## 2023-10-16 DIAGNOSIS — Z51.0 ENCOUNTER FOR ANTINEOPLASTIC RADIATION THERAPY: ICD-10-CM

## 2023-10-16 LAB
RAD ONC MSQ ACTUAL FRACTIONS DELIVERED: 14
RAD ONC MSQ ACTUAL SESSION DELIVERED DOSE: 267 CGRAY
RAD ONC MSQ ACTUAL TOTAL DOSE: 3738 CGRAY
RAD ONC MSQ ELAPSED DAYS: 19
RAD ONC MSQ LAST DATE: NORMAL
RAD ONC MSQ PRESCRIBED FRACTIONAL DOSE: 267 CGRAY
RAD ONC MSQ PRESCRIBED NUMBER OF FRACTIONS: 15
RAD ONC MSQ PRESCRIBED TECHNIQUE: NORMAL
RAD ONC MSQ PRESCRIBED TOTAL DOSE: 4005 CGRAY
RAD ONC MSQ PRESCRIPTION PATTERN COMMENT: NORMAL
RAD ONC MSQ START DATE: NORMAL
RAD ONC MSQ TREATMENT COURSE NUMBER: 1
RAD ONC MSQ TREATMENT SITE: NORMAL

## 2023-10-16 PROCEDURE — 77412 RADIATION TX DELIVERY LVL 3: CPT | Performed by: RADIOLOGY

## 2023-10-17 ENCOUNTER — RADIATION ONCOLOGY OTV (OUTPATIENT)
Dept: RADIATION ONCOLOGY | Facility: CLINIC | Age: 48
End: 2023-10-17
Payer: COMMERCIAL

## 2023-10-17 ENCOUNTER — HOSPITAL ENCOUNTER (OUTPATIENT)
Dept: RADIATION ONCOLOGY | Facility: CLINIC | Age: 48
Setting detail: RADIATION/ONCOLOGY SERIES
Discharge: STILL ADMITTED OR ADMINISTRATIVE | End: 2023-10-17
Payer: COMMERCIAL

## 2023-10-17 ENCOUNTER — APPOINTMENT (OUTPATIENT)
Dept: RADIATION ONCOLOGY | Facility: CLINIC | Age: 48
End: 2023-10-17
Payer: COMMERCIAL

## 2023-10-17 VITALS
DIASTOLIC BLOOD PRESSURE: 84 MMHG | SYSTOLIC BLOOD PRESSURE: 132 MMHG | OXYGEN SATURATION: 100 % | HEART RATE: 55 BPM | TEMPERATURE: 97.3 F | RESPIRATION RATE: 18 BRPM | BODY MASS INDEX: 23.41 KG/M2 | WEIGHT: 144.6 LBS

## 2023-10-17 PROCEDURE — 77427 RADIATION TX MANAGEMENT X5: CPT | Performed by: RADIOLOGY

## 2023-10-17 PROCEDURE — 77412 RADIATION TX DELIVERY LVL 3: CPT | Performed by: RADIOLOGY

## 2023-10-17 ASSESSMENT — ENCOUNTER SYMPTOMS
LOSS OF SENSATION IN FEET: 0
DEPRESSION: 0
OCCASIONAL FEELINGS OF UNSTEADINESS: 0

## 2023-10-17 ASSESSMENT — PAIN SCALES - GENERAL: PAINLEVEL: 0-NO PAIN

## 2023-10-18 ENCOUNTER — DOCUMENTATION (OUTPATIENT)
Dept: RADIATION ONCOLOGY | Facility: CLINIC | Age: 48
End: 2023-10-18
Payer: COMMERCIAL

## 2023-10-18 ENCOUNTER — APPOINTMENT (OUTPATIENT)
Dept: RADIATION ONCOLOGY | Facility: CLINIC | Age: 48
End: 2023-10-18
Payer: COMMERCIAL

## 2023-10-19 ENCOUNTER — APPOINTMENT (OUTPATIENT)
Dept: RADIATION ONCOLOGY | Facility: CLINIC | Age: 48
End: 2023-10-19
Payer: COMMERCIAL

## 2023-10-19 DIAGNOSIS — Z82.49 FH: THORACIC AORTIC ANEURYSM: Primary | ICD-10-CM

## 2023-10-19 NOTE — PROGRESS NOTES
See telephone message.  Patient with very strong family history of thoracic aortic aneurysm in her mother, maternal grandmother and maternal aunt.  The mother's cardiologist recommended that Meryl be screened for thoracic aortic aneurysm so I have ordered a CT angiogram of the chest.

## 2023-10-20 ENCOUNTER — APPOINTMENT (OUTPATIENT)
Dept: RADIATION ONCOLOGY | Facility: CLINIC | Age: 48
End: 2023-10-20
Payer: COMMERCIAL

## 2023-10-23 ENCOUNTER — APPOINTMENT (OUTPATIENT)
Dept: RADIATION ONCOLOGY | Facility: CLINIC | Age: 48
End: 2023-10-23
Payer: COMMERCIAL

## 2023-10-24 ENCOUNTER — APPOINTMENT (OUTPATIENT)
Dept: RADIATION ONCOLOGY | Facility: CLINIC | Age: 48
End: 2023-10-24
Payer: COMMERCIAL

## 2023-10-25 ENCOUNTER — PROCEDURE VISIT (OUTPATIENT)
Dept: OBSTETRICS AND GYNECOLOGY | Facility: CLINIC | Age: 48
End: 2023-10-25
Payer: COMMERCIAL

## 2023-10-25 VITALS
DIASTOLIC BLOOD PRESSURE: 64 MMHG | WEIGHT: 141 LBS | BODY MASS INDEX: 23.49 KG/M2 | HEIGHT: 65 IN | SYSTOLIC BLOOD PRESSURE: 110 MMHG

## 2023-10-25 DIAGNOSIS — D05.12 DUCTAL CARCINOMA IN SITU (DCIS) OF LEFT BREAST: Primary | ICD-10-CM

## 2023-10-25 PROCEDURE — 58301 REMOVE INTRAUTERINE DEVICE: CPT | Performed by: OBSTETRICS & GYNECOLOGY

## 2023-10-25 NOTE — PROGRESS NOTES
consentPatient ID: Meryl Loaiza is a 48 y.o. female.  Oncologist wants her to have the IUD out.   No bleeding last time her daughter visited like she usually does.  FSH was high when checked.   IUD Removal    Date/Time: 10/25/2023 1:03 PM    Performed by: Layla Cheek MD  Authorized by: Layla Cheek MD    Consent:     Consent obtained:  Written    Consent given by:  Patient    Procedure risks and benefits discussed: yes      Patient questions answered: yes      Patient agrees, verbalizes understanding, and wants to proceed: yes    Universal protocol:     Patient states understanding of procedure being performed: yes      Relevant documents present and verified: yes      Test results available and properly labeled: yes      Imaging studies available: yes      Required blood products, implants, devices, and special equipment available: yes      Site marked: yes    Procedure:     Removed with no complications: yes      Other reason for removal:  Concern for breast cancer    Discussed that FSH is high and will not need back up birth control.

## 2023-11-08 ENCOUNTER — APPOINTMENT (OUTPATIENT)
Dept: RADIATION ONCOLOGY | Facility: CLINIC | Age: 48
End: 2023-11-08
Payer: COMMERCIAL

## 2023-11-08 ENCOUNTER — HOSPITAL ENCOUNTER (OUTPATIENT)
Dept: RADIATION ONCOLOGY | Facility: CLINIC | Age: 48
Setting detail: RADIATION/ONCOLOGY SERIES
Discharge: HOME | End: 2023-11-08
Payer: COMMERCIAL

## 2023-11-08 ENCOUNTER — OFFICE VISIT (OUTPATIENT)
Dept: HEMATOLOGY/ONCOLOGY | Facility: CLINIC | Age: 48
End: 2023-11-08
Payer: COMMERCIAL

## 2023-11-08 ENCOUNTER — APPOINTMENT (OUTPATIENT)
Dept: HEMATOLOGY/ONCOLOGY | Facility: CLINIC | Age: 48
End: 2023-11-08
Payer: COMMERCIAL

## 2023-11-08 VITALS
DIASTOLIC BLOOD PRESSURE: 79 MMHG | RESPIRATION RATE: 16 BRPM | OXYGEN SATURATION: 99 % | BODY MASS INDEX: 23.55 KG/M2 | SYSTOLIC BLOOD PRESSURE: 110 MMHG | WEIGHT: 141.54 LBS | HEART RATE: 76 BPM | TEMPERATURE: 97.2 F

## 2023-11-08 VITALS — WEIGHT: 141 LBS | TEMPERATURE: 97.5 F | BODY MASS INDEX: 23.46 KG/M2

## 2023-11-08 DIAGNOSIS — D05.12 DUCTAL CARCINOMA IN SITU (DCIS) OF LEFT BREAST: Primary | ICD-10-CM

## 2023-11-08 PROCEDURE — 99214 OFFICE O/P EST MOD 30 MIN: CPT | Performed by: STUDENT IN AN ORGANIZED HEALTH CARE EDUCATION/TRAINING PROGRAM

## 2023-11-08 PROCEDURE — 1036F TOBACCO NON-USER: CPT | Performed by: STUDENT IN AN ORGANIZED HEALTH CARE EDUCATION/TRAINING PROGRAM

## 2023-11-08 RX ORDER — ANASTROZOLE 1 MG/1
1 TABLET ORAL DAILY
Qty: 90 TABLET | Refills: 0 | Status: SHIPPED | OUTPATIENT
Start: 2023-11-08 | End: 2024-01-30 | Stop reason: SDUPTHER

## 2023-11-08 ASSESSMENT — ENCOUNTER SYMPTOMS
PSYCHIATRIC NEGATIVE: 1
GASTROINTESTINAL NEGATIVE: 1
CARDIOVASCULAR NEGATIVE: 1
APPETITE CHANGE: 1
DEPRESSION: 0
HEMATOLOGIC/LYMPHATIC NEGATIVE: 1
EYES NEGATIVE: 1
LOSS OF SENSATION IN FEET: 0
RESPIRATORY NEGATIVE: 1
NEUROLOGICAL NEGATIVE: 1
OCCASIONAL FEELINGS OF UNSTEADINESS: 0
MUSCULOSKELETAL NEGATIVE: 1
ENDOCRINE NEGATIVE: 1

## 2023-11-08 ASSESSMENT — PAIN SCALES - GENERAL
PAINLEVEL: 0-NO PAIN
PAINLEVEL: 0-NO PAIN

## 2023-11-08 NOTE — PROGRESS NOTES
Patient ID: Meryl Loaiza is a 48 y.o. female.    The patient presents to clinic today for her history of breast cancer.    Cancer Staging   No matching staging information was found for the patient.      Diagnostic/Therapeutic History:  Treatment Synopsis:    - On 6/20/2023 she had screening mammogram that showed calcifications in the left breast at 9:00 at middle depth.  No suspicious masses or calcification of the right  breast     -On 7/14/2023 she had diagnostic mammogram that showed extremely dense breast tissue with pleomorphic calcification in the central medial left breast at middle depth.  There are group of calcifications measuring 1.8 cm l     -Left breast calcifications stereotactic guided core needle biopsy showed ductal carcinoma in situ, high nuclear grade, solid and cribriform patterns with associated necrosis.  ER +95% grade 3     -On 9/1/2023:  Dr. Martínez performed left partial mastectomy that showed DCIS grade 3,  measuring 0.6cm with path stage: pTisNxMx     -Completed radiation therapy on 10/16/2023        History of Present Illness (HPI)/Interval History:  Doing well, has some fatigue that is getting better    She denies any fevers or chills.    She denies any chest pain or breathing issues.     She denies any vision changes or headache issues, dizziness, loss of balance, neuropathy and no falls.     She denies any new or unexplained bone aches or pains.    She denies any skin lesions or masses, hair loss, nail changes, or oral sores.    She reports a normal appetite and normal bowel movements. Her weight is stable.     She denies any issues with sleep or fatigue.     Doing well, recovering well from surgery  No fever. no chills, no headache, no blurred vision  No chest pain, no shortness of breath, no abdominal pain, no nausea, no vomitting   No diarrhea. No appetite changes or weight changes     14 pts review of system otherwise unremarkable      PMH: none     PSH: breast reduction  in the 90s     Allergies/meds reviewed     Reproductive hx: Menarche 11, AFLB: 27, menopause yes, HRT: no, has Mirena IUD in place since 7 years        Review of Systems:  14-point ROS otherwise negative, as per HPI.    Past Medical History:   Diagnosis Date    Achilles tendinitis, left leg     Achilles tendinitis of left lower extremity    Anxiety     Migraine, unspecified, not intractable, without status migrainosus 08/22/2019    Migraine    Nevus 06/12/2023    Thrombocytopenia (CMS/HCC) 06/12/2023    Weight gain 06/12/2023       Past Surgical History:   Procedure Laterality Date    BREAST SURGERY  12/1993    OTHER SURGICAL HISTORY  08/22/2019    Breast reduction    WISDOM TOOTH EXTRACTION  2008?       Social History     Socioeconomic History    Marital status:      Spouse name: Not on file    Number of children: Not on file    Years of education: Not on file    Highest education level: Not on file   Occupational History    Not on file   Tobacco Use    Smoking status: Never    Smokeless tobacco: Never   Substance and Sexual Activity    Alcohol use: Yes     Comment: 0 - 2 drinks/week.  Usually 0.    Drug use: Never    Sexual activity: Yes     Partners: Male     Birth control/protection: I.U.D.     Comment:  25 years!   Other Topics Concern    Not on file   Social History Narrative    Not on file     Social Determinants of Health     Financial Resource Strain: Not on file   Food Insecurity: Not on file   Transportation Needs: Not on file   Physical Activity: Not on file   Stress: Not on file   Social Connections: Not on file   Intimate Partner Violence: Not on file   Housing Stability: Not on file       No Known Allergies      Current Outpatient Medications:     levonorgestrel (Mirena) 21 mcg/24 hours (8 yrs) 52 mg IUD, by intrauterine route., Disp: , Rfl:     multivitamin tablet, Take 1 tablet by mouth once daily., Disp: , Rfl:     omega-3/dha/epa/fish oil (OMEGA-3 ORAL), Take by mouth., Disp: , Rfl:      SUMAtriptan (Imitrex) 25 mg tablet, Take by mouth twice a day., Disp: , Rfl:      Objective    BSA: There is no height or weight on file to calculate BSA.  There were no vitals taken for this visit.    ECOG Performance Status: 0    Physical Exam    Constitutional: Well developed, awake/alert/oriented  x3, no distress, alert and cooperative   Eyes: PERRL, EOMI, clear sclera   ENMT: mucous membranes moist, no apparent injury,  no lesions seen   Head/Neck: Neck supple, no apparent injury, thyroid  without mass or tenderness, No JVD, trachea midline, no bruits   Respiratory/Thorax: Patent airways, CTAB, normal  breath sounds with good chest expansion, thorax symmetric   Cardiovascular: Regular, rate and rhythm, no murmurs,  2+ equal pulses of the extremities, normal S 1and S 2   Extremities: normal extremities, no cyanosis edema,  contusions or wounds, no clubbing   Breast: deferred this visit       Laboratory Data:  Lab Results   Component Value Date    WBC 6.5 08/22/2023    HGB 13.2 08/22/2023    HCT 38.8 08/22/2023    MCV 89 08/22/2023     (L) 08/22/2023       Chemistry    Lab Results   Component Value Date/Time     08/22/2023 1227    K 5.1 08/22/2023 1227     08/22/2023 1227    CO2 27 08/22/2023 1227    BUN 9 08/22/2023 1227    CREATININE 0.85 08/22/2023 1227    Lab Results   Component Value Date/Time    CALCIUM 9.8 08/22/2023 1227    ALKPHOS 39 07/20/2023 0841    AST 17 07/20/2023 0841    ALT 16 07/20/2023 0841    BILITOT 0.9 07/20/2023 0841             Radiology:  BI RAD breast exam specimen  Narrative: Interpreted By:  JAROCHO MAYO MD and EDENILSON SPAULDING MD  MRN: [id number]  Patient Name: STEFANIE EDMONDS     STUDY:  RAD BREAST EXAM SPECIMEN;  9/1/2023 1:10 pm     ACCESSION NUMBER(S):  [id number]     ORDERING CLINICIAN:  JAI HERNANDEZ     INDICATION:  Left breast Magseed localization.     FINDINGS:  The specimen radiograph demonstrates the calcifications of concern  with the associated biopsy  marker and Magseed in the tissues.     Impression: Status post surgical excision of left breast Magseed localization.     I personally reviewed the images/study and I agree with the findings  as stated.     MACRO:  None       BI mammo left diagnostic 07/14/2023    Narrative  Interpreted By:  EMIL BLOOM MD  MRN: [id number]  Patient Name: STEFANIE EDMONDS    STUDY:  DIGITAL DIAG MAMMO LEFT UNILAT;  7/14/2023 3:30 pm    ACCESSION NUMBER(S):  [id number]    ORDERING CLINICIAN:  BAM HILL    INDICATION:  Patient recalled from screening mammography dated 06/20/2023 for  indeterminate left breast calcifications.    COMPARISON:  06/20/2023, 02/26/2020, 11/14/2018    FINDINGS:    The breast tissue is extremely dense, which may limit the sensitivity  of mammography. Orthogonal spot magnification views were performed.  In the central medial left breast at middle depth there are  pleomorphic grouped calcifications. The group of calcifications  measures 1.8 cm. A stereotactic biopsy is recommended.    Impression  Indeterminate left breast calcifications. A stereotactic biopsy is  recommended. This was discussed with the patient at the time of her  visit with Dr. Bloom. A preprocedure form has been completed.  A  message was sent to the referring practioner at the time of this  dictation regarding these critical findings using the [employer] system.    BI-RADS CATEGORY:    Category: 4 - Suspicious.  Recommendation: Biopsy Recommended.    Method of Detection: Category Sdbt - 3D Screening    For any future breast imaging appointments, please call [phone]).    Patient letter sent [facility]          Assessment/Plan:  48 year old female previously healthy found to have new ER+ DCIS s/p PM , performed by Dr Martínez coming in to discuss adjuvant treatment      - On 6/20/2023 she had screening mammogram that showed calcifications in the left breast at 9:00 at middle depth.  No suspicious masses or calcification of the right  breast     -On 7/14/2023 she had diagnostic mammogram that showed extremely dense breast tissue with pleomorphic calcification in the central medial left breast at middle depth.  There are group of calcifications measuring 1.8 cm l     -Left breast calcifications stereotactic guided core needle biopsy showed ductal carcinoma in situ, high nuclear grade, solid and cribriform patterns with associated necrosis.  ER +95% grade 3     -On 9/1/2023:  Dr. Martínez performed left partial mastectomy that showed DCIS grade 3,  measuring 0.6cm with path stage:   pTisNxMx    - completed radiation therapy on 10/16    - FSH: above 90, patient is post menopausal Prescribed anastrozole 1mg daily sent in to her pharmacy. c/w Ca/vit D. I did order baseline DEXA scan  - Referral in place for integrative oncology with Dr Olea  - C in 6M, I told Meryl to call us if she has issues with hormonal therapy      At least 35minutes of direct consultation was spent reviewing the patient's chart as well as discussing and  reviewing the  cancer care plan including educating and answering  questions and concerns, greater than 50 percent spent in counseling and coordination  of care.             Dane Elena MD  Hematology and Medical Oncology  [facility]

## 2023-11-08 NOTE — PROGRESS NOTES
Radiation Oncology Follow-Up    Patient Name:  Meryl Loaiza  MRN:  [id number]  :  1975    Referring Provider: No ref. provider found  Primary Care Provider: Oswaldo Dunaway MD  Care Team: Patient Care Team:  Oswaldo Dunaway MD as PCP - General  Dane Elena MD as Consulting Physician (Hematology and Oncology)    Date of Service: 2023     SUBJECTIVE  History of Present Illness:   Meryl Loaiza is a 48 y.o. female who was previously seen at the [facility] Department of Radiation Oncology for skin check s/o adjuvant radiation to the L breast:    49 yo w L breast DCIS Int to high grade, cribiform and solid 6mm neg margins ER 95%    Treatment Rendered:   Radiation Treatments       Active   L breast FB (Started on 2023)   Most recent fraction: 267 cGy given on 10/16/2023   Total given: 3,738 cGy / 4,005 cGy  (14 of 15 fractions)   Elapsed Days: 19   Technique: Opposed Tangential           Historical   No historical radiation treatments to show.               Review of Systems:   Review of Systems   Constitutional:  Positive for appetite change (intermittent, up and down).   HENT:  Negative.     Eyes: Negative.    Respiratory: Negative.     Cardiovascular: Negative.    Gastrointestinal: Negative.    Endocrine: Negative.    Genitourinary: Negative.     Musculoskeletal: Negative.    Skin:         Skin check, reviewed skin care recommendations with patient    Neurological: Negative.    Hematological: Negative.    Psychiatric/Behavioral: Negative.       The patient's current pain level was assessed.  They report currently having a pain of 0 out of 10.  They feel their pain is under control without the use of pain medications.    Performance Status:   The Karnofsky performance scale today is 90, Able to carry on normal activity; minor signs or symptoms of disease (ECOG equivalent 0).        OBJECTIVE  Vital Signs:  Temp 36.4 °C (97.5 °F)   Wt 64 kg (141 lb)    BMI 23.46 kg/m²    Physical Exam:  NAD  L breast with minimal barely visible residual hyperpigmentation no breakdown   No lymphedema or adnenopathy    ASSESSMENT:  Meryl Loaiza is a 48 y.o. female with DCIS of the left breast and resolved radiation dermatitis    PLAN:  Long term skin care, self breast exams, annual mammography, endocrine therapy, routine follow up and principles of survivorship discussed  NCCN Guidelines were applicable to guide this patients treatment plan.  Casie Behrend, RN

## 2023-11-09 ENCOUNTER — APPOINTMENT (OUTPATIENT)
Dept: RADIOLOGY | Facility: CLINIC | Age: 48
End: 2023-11-09
Payer: COMMERCIAL

## 2023-11-16 ENCOUNTER — ANCILLARY PROCEDURE (OUTPATIENT)
Dept: RADIOLOGY | Facility: CLINIC | Age: 48
End: 2023-11-16
Payer: COMMERCIAL

## 2023-11-16 DIAGNOSIS — Z82.49 FH: THORACIC AORTIC ANEURYSM: ICD-10-CM

## 2023-11-16 DIAGNOSIS — D05.12 DUCTAL CARCINOMA IN SITU (DCIS) OF LEFT BREAST: ICD-10-CM

## 2023-11-16 PROCEDURE — 77080 DXA BONE DENSITY AXIAL: CPT

## 2023-11-16 PROCEDURE — 2550000001 HC RX 255 CONTRASTS: Performed by: FAMILY MEDICINE

## 2023-11-16 PROCEDURE — 77080 DXA BONE DENSITY AXIAL: CPT | Performed by: STUDENT IN AN ORGANIZED HEALTH CARE EDUCATION/TRAINING PROGRAM

## 2023-11-16 PROCEDURE — 71275 CT ANGIOGRAPHY CHEST: CPT

## 2023-11-16 PROCEDURE — 71275 CT ANGIOGRAPHY CHEST: CPT | Performed by: RADIOLOGY

## 2023-11-16 RX ADMIN — IOHEXOL 90 ML: 350 INJECTION, SOLUTION INTRAVENOUS at 09:32

## 2023-11-16 NOTE — RESULT ENCOUNTER NOTE
Please inform the patient that her thoracic CT scan did not show any evidence of thoracic aortic aneurysm.

## 2023-11-16 NOTE — PROGRESS NOTES
Radiation Oncology On Treatment Visit    Patient Name:  Meryl Loaiza  MRN:  [id number]  :  1975    Referring Provider: No ref. provider found  Primary Care Provider: Oswaldo Dunaway MD  Care Team: Patient Care Team:  Oswaldo Dunaway MD as PCP - General  Dane Elena MD as Consulting Physician (Hematology and Oncology)    Date of Service: 10/17/2023     Diagnosis:   Specialty Problems    None    Treatment Summary:  Radiation Treatments       Active   L breast FB (Started on 2023)   Most recent fraction: 267 cGy given on 10/16/2023   Total given: 3,738 cGy / 4,005 cGy  (14 of 15 fractions)   Elapsed Days: 19   Technique: Opposed Tangential                   SUBJECTIVE: L breast a little tender, otherwise well. UA culture showing staph. Started amoxicillin yesterday.     OBJECTIVE:   Vital Signs:  /84 (BP Location: Right arm, Patient Position: Sitting, BP Cuff Size: Adult)   Pulse 55   Temp 36.3 °C (97.3 °F) (Tympanic)   Resp 18   Wt 65.6 kg (144 lb 9.6 oz)   SpO2 100%   BMI 23.41 kg/m²    Pain Scale: The patient's current pain level was assessed.  They report currently having a pain of 0 out of 10.    Other Pertinent Findings:  minimal erythema L breast, under arm only    Toxicity Assessment          10/10/2023    08:14 10/17/2023    08:00   Toxicity Assessment   Adverse Events Reviewed (WDL)  Yes (Within Defined Limits)   Treatment Site Breast Breast   Anorexia Grade 0 Grade 0   Dermatitis Radiation Grade 0 Grade 1   Fatigue Grade 1 Grade 1   Pain Grade 0       discomfort to treatment area Grade 0   Breast Infection Grade 0           Assessment / Plan:  The patient is tolerating radiation therapy as anticipated.  Continue per current treatment plan.        
Radiation Oncology Treatment Summary    Patient Name:  Meryl Loaiza  MRN:  [id number]  :  1975    Referring Provider: No ref. provider found  Primary Care Provider: Oswaldo Dunaway MD    Brief History: Meryl Loaiza is a  49 yo w L breast DCIS Int to high grade, cribiform and solid 6mm neg margins ER 95%. The patient completed radiotherapy as outlined below.    Radiation Treatment Summary:    Radiation Treatments       Active   L breast FB (Started on 2023)   Most recent fraction: 267 cGy given on 10/16/2023   Total given: 3,738 cGy / 4,005 cGy  (14 of 15 fractions)   Elapsed Days: 19   Technique: Opposed Tangential                   Concurrent Chemotherapy:  Endocrine therapy to follow    oxicity Assessment            10/10/2023    08:14 10/17/2023    08:00   Toxicity Assessment   Adverse Events Reviewed (WDL)   Yes (Within Defined Limits)   Treatment Site Breast Breast   Anorexia Grade 0 Grade 0   Dermatitis Radiation Grade 0 Grade 1   Fatigue Grade 1 Grade 1   Pain Grade 0       discomfort to treatment area Grade 0   Breast Infection Grade 0        Patient Disposition: Skin check 1 week     
2. The status of comorbities. (See ED/admit documents)

## 2023-11-27 ENCOUNTER — ALLIED HEALTH (OUTPATIENT)
Dept: INTEGRATIVE MEDICINE | Facility: CLINIC | Age: 48
End: 2023-11-27
Payer: COMMERCIAL

## 2023-11-27 DIAGNOSIS — F41.9 ANXIETY DISORDER, UNSPECIFIED TYPE: ICD-10-CM

## 2023-11-27 DIAGNOSIS — D05.12 DUCTAL CARCINOMA IN SITU (DCIS) OF LEFT BREAST: Primary | ICD-10-CM

## 2023-11-27 DIAGNOSIS — G43.809 OTHER MIGRAINE WITHOUT STATUS MIGRAINOSUS, NOT INTRACTABLE: ICD-10-CM

## 2023-11-27 PROCEDURE — 99205 OFFICE O/P NEW HI 60 MIN: CPT | Performed by: HOSPITALIST

## 2023-11-27 NOTE — PROGRESS NOTES
"Patient ID: Meryl Loaiza is a 48 y.o. female.  Referring Physician: No referring provider defined for this encounter.  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  There were no vitals taken for this visit.    PHYSICAL EXAM:  NAD, awake/alert  HEENT, NCAT, OP clear, no oral lesions  CTA bilat  RRR no mgr  Abd soft/nt/nd+bs  No c/c/e/ttp  Motor/sensory intact, CN 2-12 intact     RESULTS:  Lab Results   Component Value Date    WBC 6.5 08/22/2023    HGB 13.2 08/22/2023    HCT 38.8 08/22/2023     (L) 08/22/2023    CREATININE 0.85 08/22/2023    AST 17 07/20/2023       Assessment/Plan   47 yo postmenopausal woman with h/o L breast DCIS ER +, s/p L partial mastectomy 9/1/23 and adjuvant radiation 10/16/23    Breast cancer:  Whole Foods plant based diet   5-9 fruits/veg/day, Cruciferous Vegetables - Brussel Sprouts, Kale, Broccoli, Cauliflower  [employer] \"New American Plate\"  Organic dairy preferred  Limit sugar  Consider intermittent fasting " 3-5 days per week (not eating for 13 hrs straight)  Limit alcohol   Moderate soy is ok (edamame/tofu) once/day  Fiber including flax/praveen seeds beneficial    VIOME testing    Exercise 30 min/day 5 days a week, vary by balance, cardio and resistance training    Supplements to consider:  Turmeric (aury)  [employer] STAMETS 7  Melatonin 1-3 mg at night 1 hr prior to sleep  Vitamin D3 0048-5822 IU/day  Omega 3 supplementation (nordic naturals)    Reading:  Anticancer Living  Cancer Fighting Kitchen    Websites:  Anticancer Lifestyle Program - anticancerlifestyle.org  Cancerchoices.org  Cook for your Life    Podcast: Integrative Oncology Talk    Apps: Oncio flori ([email or web address])    Support: [employer] (exercise programs, dietitian, support groups, financial support and services)    SYMPTOM MANAGEMENT:  Hot Flashes:  Acupuncture - Group acupuncture in [place]  Yoga is helpful as is stress management  Consider Acteane (Boiron, homeopathic)  Relazen (Swedish Herb)  Essential oils (Peppermint)    Sleep - should try to sleep by 10 pm  Limit TV or exposure to light at night including cell phones  Limit caffeine to AM only if needed (e.g. coffee)  Morning exposure to light, getting outside or bright white light in the morning  [place]ille Tea  Consider melatonin 1-3 mg 1 hr prior to sleep  Ashwaghanda (Aury) or PROZE NODZZ  Essential oils - DoTerra (Lemon or lavender) in diffuser  Massage therapy  Magnesium (magnesium oxide 400 mg/day)    Exercise regularly 30min/daily  Consider breathing exercises in the morning 5 min (alternate nostril breathing)   Deep abdominal breathing (Hemant Dmitriy Breathing)  Meditation in the morning, consider mindfulness classes  Exposure to sun or bright light therapy in the morning  Yoga    Migraines - magnesium  Acupuncture    Follow up: IO 3 months  IO Symptom management clinic  Consider lifestyle and stress management classes in future    Thank you for consulting me in for this patient  Willam PFEIFFER  MD [place]

## 2023-12-05 ENCOUNTER — ALLIED HEALTH (OUTPATIENT)
Dept: INTEGRATIVE MEDICINE | Facility: CLINIC | Age: 48
End: 2023-12-05

## 2023-12-05 DIAGNOSIS — G47.00 INSOMNIA: Primary | ICD-10-CM

## 2023-12-05 PROCEDURE — 97139 UNLISTED THERAPEUTIC PX: CPT

## 2023-12-05 NOTE — PROGRESS NOTES
Acupuncture Visit: GROUP ACU    Subjective   Patient ID: Meryl Loaiza is a 48 y.o. female who presents for Insomnia  GROUP ACU NPV: Insomnia    12/05/23    Patient is here due to sleep concerns.  She is a referral from Dr. Olea.  She reports the onset of her sleep concerns started after she finished radiation therapy for breast cancer.  She continues to have difficulty staying asleep with frequent waking.  Cancer hx, June 2023 mammogram, September 2023 biopsy.  Prior to radiation therapy, she was a healthy sleeper with a relatively healthy lifestyle but since going through radiation during which she experienced a lot of thirst and dryness requiring frequent hydration well into the night, she has had difficulty staying asleep since.    Anastrozole medication induced headaches; prescribed about a month ago.        Session Information  Is this acupuncture treatment being billed to the patient's insurance company: No  Visit Type: New patient  Medical History Reviewed: I have reviewed pertinent medical history in EHR, and no contraindications are present to provide treatment  Description of present complaint: Sleep disturbance         Review of Systems         Provider reviewed plan for the acupuncture session, precautions and contraindications. Patient/guardian/hospital staff has given consent to treat with full understanding of what to expect during the session. Before acupuncture began, provider explained to the patient to communicate at any time if the procedure was causing discomfort past their tolerance level. Patient agreed to advise acupuncturist. The acupuncturist counseled the patient on the risks of acupuncture treatment including pain, infection, bleeding, and no relief of pain. The patient was positioned comfortably. There was no evidence of infection at the site of needle insertions.    Objective   Physical Exam         Treatment Plan  Treatment Goals: Wellbeing improvement    Acupuncture  Treatment  Patient Position: Seated and reclining  Acupuncture Needling: Yes  Needle Guage: 36 guage /.20/ Blue seirin  Body Points: With retention  Body Points - Bilateral: LV3; KD6; PC6; ST36; GV20; Yintang  Auricular Points: Yes  Auricular Points - Bilateral: Shenmen  Electroacupuncture Used: No  Needle Count In: 12  Needle Count Out: 12              Assessment/Plan

## 2023-12-12 ENCOUNTER — ALLIED HEALTH (OUTPATIENT)
Dept: INTEGRATIVE MEDICINE | Facility: CLINIC | Age: 48
End: 2023-12-12

## 2023-12-12 DIAGNOSIS — G47.00 INSOMNIA, UNSPECIFIED TYPE: Primary | ICD-10-CM

## 2023-12-12 PROCEDURE — 97139 UNLISTED THERAPEUTIC PX: CPT

## 2023-12-12 NOTE — PROGRESS NOTES
Acupuncture Visit: GROUP ACU    Subjective   Patient ID: Meryl Loaiza is a 48 y.o. female who presents for Insomnia    GROUP ACU NPV: Insomnia    12/12/23    Patient states she feels like sleep has been getting better.  No frequent night sweats lately.  Woke up once around 4am last week with a bit of sweat but hasn't had that happen since.  The frequent waking is at a minimum and is able to return to sleep much faster than before.    12/05/23    Patient is here due to sleep concerns.  She is a referral from Dr. Olea.  She reports the onset of her sleep concerns started after she finished radiation therapy for breast cancer.  She continues to have difficulty staying asleep with frequent waking.  Cancer hx, June 2023 mammogram, September 2023 biopsy.  Prior to radiation therapy, she was a healthy sleeper with a relatively healthy lifestyle but since going through radiation during which she experienced a lot of thirst and dryness requiring frequent hydration well into the night, she has had difficulty staying asleep since.    Anastrozole medication induced headaches; prescribed about a month ago.        Session Information  Is this acupuncture treatment being billed to the patient's insurance company: No  Visit Type: Follow-up visit  Medical History Reviewed: I have reviewed pertinent medical history in EHR, and no contraindications are present to provide treatment         Review of Systems         Provider reviewed plan for the acupuncture session, precautions and contraindications. Patient/guardian/hospital staff has given consent to treat with full understanding of what to expect during the session. Before acupuncture began, provider explained to the patient to communicate at any time if the procedure was causing discomfort past their tolerance level. Patient agreed to advise acupuncturist. The acupuncturist counseled the patient on the risks of acupuncture treatment including pain, infection, bleeding, and  no relief of pain. The patient was positioned comfortably. There was no evidence of infection at the site of needle insertions.    Objective   Physical Exam                             Assessment/Plan

## 2024-01-02 ENCOUNTER — ALLIED HEALTH (OUTPATIENT)
Dept: INTEGRATIVE MEDICINE | Facility: CLINIC | Age: 49
End: 2024-01-02

## 2024-01-02 DIAGNOSIS — G47.00 INSOMNIA, UNSPECIFIED TYPE: Primary | ICD-10-CM

## 2024-01-02 DIAGNOSIS — F41.9 ANXIETY DISORDER, UNSPECIFIED TYPE: ICD-10-CM

## 2024-01-02 PROCEDURE — 97139 UNLISTED THERAPEUTIC PX: CPT

## 2024-01-02 NOTE — PROGRESS NOTES
Acupuncture Visit: GROUP ACU    Subjective   Patient ID: Meryl Loaiza is a 48 y.o. female who presents for Anxiety and Insomnia    GROUP ACU NPV: Insomnia    01/02/24    iMask, meditating and breathing exercises have all been working with helping to sleep better at night.  She wakes up sometimes but is able to fall back easily.  Anxiety has been manageable as well.    12/12/23    Patient states she feels like sleep has been getting better.  No frequent night sweats lately.  Woke up once around 4am last week with a bit of sweat but hasn't had that happen since.  The frequent waking is at a minimum and is able to return to sleep much faster than before.    12/05/23    Patient is here due to sleep concerns.  She is a referral from Dr. Olea.  She reports the onset of her sleep concerns started after she finished radiation therapy for breast cancer.  She continues to have difficulty staying asleep with frequent waking.  Cancer hx, June 2023 mammogram, September 2023 biopsy.  Prior to radiation therapy, she was a healthy sleeper with a relatively healthy lifestyle but since going through radiation during which she experienced a lot of thirst and dryness requiring frequent hydration well into the night, she has had difficulty staying asleep since.    Anastrozole medication induced headaches; prescribed about a month ago.        Session Information  Is this acupuncture treatment being billed to the patient's insurance company: No  Visit Type: Follow-up visit  Medical History Reviewed: I have reviewed pertinent medical history in EHR, and no contraindications are present to provide treatment         Review of Systems         Provider reviewed plan for the acupuncture session, precautions and contraindications. Patient/guardian/hospital staff has given consent to treat with full understanding of what to expect during the session. Before acupuncture began, provider explained to the patient to communicate at any time if  the procedure was causing discomfort past their tolerance level. Patient agreed to advise acupuncturist. The acupuncturist counseled the patient on the risks of acupuncture treatment including pain, infection, bleeding, and no relief of pain. The patient was positioned comfortably. There was no evidence of infection at the site of needle insertions.    Objective   Physical Exam         Treatment Plan  Treatment Goals: Wellbeing improvement, Relaxation, Anxiety reduction    Acupuncture Treatment  Patient Position: Seated and reclining  Acupuncture Needling: Yes  Needle Guage: 38 guage /.18/ Yellow seirin  Body Points: With retention  Body Points - Bilateral: LV3; KD3,6; ST36; SP6; PC6; Yintang; GV20  Auricular Points: No  Electroacupuncture Used: No  Needle Count In: 14  Needle Count Out: 14  Needle Retention Time (min): 30 minutes              Assessment/Plan

## 2024-01-30 DIAGNOSIS — D05.12 DUCTAL CARCINOMA IN SITU (DCIS) OF LEFT BREAST: ICD-10-CM

## 2024-01-30 RX ORDER — ANASTROZOLE 1 MG/1
1 TABLET ORAL DAILY
Qty: 90 TABLET | Refills: 3 | Status: SHIPPED | OUTPATIENT
Start: 2024-01-30 | End: 2024-05-01 | Stop reason: SDUPTHER

## 2024-02-15 ENCOUNTER — PATIENT MESSAGE (OUTPATIENT)
Dept: OBSTETRICS AND GYNECOLOGY | Facility: CLINIC | Age: 49
End: 2024-02-15
Payer: COMMERCIAL

## 2024-02-15 ENCOUNTER — NURSE TRIAGE (OUTPATIENT)
Dept: ADMISSION | Facility: HOSPITAL | Age: 49
End: 2024-02-15
Payer: COMMERCIAL

## 2024-02-15 DIAGNOSIS — N93.9 ABNORMAL UTERINE BLEEDING: ICD-10-CM

## 2024-02-15 NOTE — TELEPHONE ENCOUNTER
Pt advised that Dr. Elena agrees with GYN evaluation for breakthrough bleeding.  Pt is scheduled for an US on 2/19/24.

## 2024-02-15 NOTE — TELEPHONE ENCOUNTER
Pt is post-menopausal and yesterday developed moderate vaginal bleeding.  She is otherwise feeling well with no other unusual bleeding or symptoms of acute blood loss. She contacted GYN this morning, awaiting guidance.  Wanted to inform Dr. Elena as well.  She started anastrozole in November.  Additional Information   Negative: Have you taken any new drugs?     Started anastrozole November 2023   Negative: Are there daily activities that you're having trouble with such as getting dressed or making meals?     Pt is in [facility] helping her daughter move in to an apartment   Commented on: (If applicable) How many feminine pads or bandages have been soaked with blood each hour?     Only has panty liners which she has had to change several times today because they are not very absorbant.  Currently at the store to buy pads.  Describes bleeding as moderate    Protocols used: Bleeding

## 2024-02-19 ENCOUNTER — APPOINTMENT (OUTPATIENT)
Dept: INTEGRATIVE MEDICINE | Facility: CLINIC | Age: 49
End: 2024-02-19
Payer: COMMERCIAL

## 2024-02-19 ENCOUNTER — HOSPITAL ENCOUNTER (OUTPATIENT)
Dept: RADIOLOGY | Facility: CLINIC | Age: 49
Discharge: HOME | End: 2024-02-19
Payer: COMMERCIAL

## 2024-02-19 DIAGNOSIS — N93.9 ABNORMAL UTERINE BLEEDING: ICD-10-CM

## 2024-02-19 PROCEDURE — 76830 TRANSVAGINAL US NON-OB: CPT | Performed by: RADIOLOGY

## 2024-02-19 PROCEDURE — 76856 US EXAM PELVIC COMPLETE: CPT | Performed by: RADIOLOGY

## 2024-02-19 PROCEDURE — 76856 US EXAM PELVIC COMPLETE: CPT

## 2024-02-21 ENCOUNTER — TELEPHONE (OUTPATIENT)
Dept: OBSTETRICS AND GYNECOLOGY | Facility: CLINIC | Age: 49
End: 2024-02-21
Payer: COMMERCIAL

## 2024-02-21 ENCOUNTER — LAB (OUTPATIENT)
Dept: LAB | Facility: LAB | Age: 49
End: 2024-02-21
Payer: COMMERCIAL

## 2024-02-21 DIAGNOSIS — N83.209 CYST OF OVARY, UNSPECIFIED LATERALITY: ICD-10-CM

## 2024-02-21 DIAGNOSIS — N94.89 ADNEXAL MASS: Primary | ICD-10-CM

## 2024-02-21 LAB — CANCER AG125 SERPL-ACNC: 15.9 U/ML (ref 0–30.2)

## 2024-02-21 PROCEDURE — 36415 COLL VENOUS BLD VENIPUNCTURE: CPT

## 2024-02-21 PROCEDURE — 86304 IMMUNOASSAY TUMOR CA 125: CPT

## 2024-02-21 NOTE — TELEPHONE ENCOUNTER
Pt spoke to Dr. Cheek on the phone to review POC and gyn/onc follow up. Order for a  has been placed in pt chart. She will have lab drawn at an o/p location.

## 2024-03-01 ENCOUNTER — OFFICE VISIT (OUTPATIENT)
Dept: GYNECOLOGIC ONCOLOGY | Facility: HOSPITAL | Age: 49
End: 2024-03-01
Payer: COMMERCIAL

## 2024-03-01 ENCOUNTER — PREP FOR PROCEDURE (OUTPATIENT)
Dept: OPERATING ROOM | Facility: HOSPITAL | Age: 49
End: 2024-03-01

## 2024-03-01 VITALS
WEIGHT: 141.6 LBS | DIASTOLIC BLOOD PRESSURE: 82 MMHG | TEMPERATURE: 97.2 F | SYSTOLIC BLOOD PRESSURE: 116 MMHG | HEART RATE: 67 BPM | OXYGEN SATURATION: 97 % | BODY MASS INDEX: 23.56 KG/M2 | RESPIRATION RATE: 18 BRPM

## 2024-03-01 DIAGNOSIS — R19.00 PELVIC MASS: Primary | ICD-10-CM

## 2024-03-01 PROCEDURE — 99215 OFFICE O/P EST HI 40 MIN: CPT | Performed by: STUDENT IN AN ORGANIZED HEALTH CARE EDUCATION/TRAINING PROGRAM

## 2024-03-01 RX ORDER — GABAPENTIN 600 MG/1
600 TABLET ORAL ONCE
Status: CANCELLED | OUTPATIENT
Start: 2024-03-01 | End: 2024-03-01

## 2024-03-01 RX ORDER — ACETAMINOPHEN 325 MG/1
975 TABLET ORAL ONCE
Status: CANCELLED | OUTPATIENT
Start: 2024-03-01 | End: 2024-03-01

## 2024-03-01 RX ORDER — HEPARIN SODIUM 5000 [USP'U]/ML
5000 INJECTION, SOLUTION INTRAVENOUS; SUBCUTANEOUS ONCE
Status: CANCELLED | OUTPATIENT
Start: 2024-03-01 | End: 2024-03-01

## 2024-03-01 RX ORDER — CELECOXIB 200 MG/1
400 CAPSULE ORAL ONCE
Status: CANCELLED | OUTPATIENT
Start: 2024-03-01 | End: 2024-03-01

## 2024-03-01 ASSESSMENT — COLUMBIA-SUICIDE SEVERITY RATING SCALE - C-SSRS
1. IN THE PAST MONTH, HAVE YOU WISHED YOU WERE DEAD OR WISHED YOU COULD GO TO SLEEP AND NOT WAKE UP?: NO
6. HAVE YOU EVER DONE ANYTHING, STARTED TO DO ANYTHING, OR PREPARED TO DO ANYTHING TO END YOUR LIFE?: NO
2. HAVE YOU ACTUALLY HAD ANY THOUGHTS OF KILLING YOURSELF?: NO

## 2024-03-01 ASSESSMENT — PATIENT HEALTH QUESTIONNAIRE - PHQ9
1. LITTLE INTEREST OR PLEASURE IN DOING THINGS: NOT AT ALL
2. FEELING DOWN, DEPRESSED OR HOPELESS: NOT AT ALL
SUM OF ALL RESPONSES TO PHQ9 QUESTIONS 1 AND 2: 0

## 2024-03-01 ASSESSMENT — ENCOUNTER SYMPTOMS
LOSS OF SENSATION IN FEET: 0
DEPRESSION: 0
OCCASIONAL FEELINGS OF UNSTEADINESS: 0

## 2024-03-01 ASSESSMENT — PAIN SCALES - GENERAL: PAINLEVEL: 0-NO PAIN

## 2024-03-01 NOTE — PROGRESS NOTES
Patient ID: Meryl Loaiza is a 49 y.o. female.  Referring Physician: No referring provider defined for this encounter.  Primary Care Provider: Oswaldo Dunaway MD      Subjective    HPI: 49 y.o. presenting in consultation from Dr. Cheek to address pelvic mass.     History of L breast DCIS ER +, s/p L partial mastectomy 23 and adjuvant radiation 10/16/23. Currently on anastrazole. She was seen by Dr Cheek. Reported vaginal spotting. Pelvic US uterus measuring 8.3 x 3.6 x 4.5 cm w/ 1 cm fibroid EML 3 mm L ovary Left ovary size: 3.6 x 2.1 x 2.2 cm w/ a small solid appearing lesion measuring up to 1.3 x 1.2 cm.  normal. Referred for further management.     Denies ongoing vaginal bleeding. Low quadrant abdominal cramping, mild and currently rates it 2-3/10. Voiding without difficulty. Denies early satiety, nausea/emesis. Weight stable.     PMH:  Breast cancer  Anxiety     PSH:  Mastectomy L  Breast reduction   D&C    OBHx:  The patient is a  s/p . She underwent menopause unknown, no VMS and amenorrhea on LNG-IUD s/p removal. She did not use HRT.    Social:  Social Connections: Not on file   . The patient lives at home with angel, . The patient works at the [employer]  .     FamHx:  Cancer-related family history is not on file.   Paternal grandfather diagnosed w/ colon cancer age 60  Mom thyroid cancer     Screening:  Cervical cancer: UTD normal  Mammogram:  23 BIRADS-4   Colonoscopy: cologuard 6/15/22 negative      Review of Systems   All other systems reviewed and are negative.       Objective   BSA: 1.72 meters squared  /82   Pulse 67   Temp 36.2 °C (97.2 °F)   Resp 18   Wt 64.2 kg (141 lb 9.6 oz)   SpO2 97%   BMI 23.56 kg/m²      Family History   Problem Relation Name Age of Onset    Stroke Paternal Grandfather Osvaldo Loaiza  reports that she has never smoked. She has never used smokeless tobacco.  She  reports current alcohol use.  She  reports  no history of drug use.    Physical Exam  Vitals and nursing note reviewed. Exam conducted with a chaperone present.   Constitutional:       General: She is not in acute distress.     Appearance: Normal appearance.   HENT:      Head: Normocephalic and atraumatic.      Mouth/Throat:      Mouth: Mucous membranes are moist.      Pharynx: No oropharyngeal exudate or posterior oropharyngeal erythema.   Eyes:      Extraocular Movements: Extraocular movements intact.      Conjunctiva/sclera: Conjunctivae normal.      Pupils: Pupils are equal, round, and reactive to light.   Neck:      Thyroid: No thyroid mass or thyromegaly.   Cardiovascular:      Rate and Rhythm: Normal rate and regular rhythm.      Pulses: Normal pulses.      Heart sounds: Normal heart sounds.   Pulmonary:      Effort: Pulmonary effort is normal.      Breath sounds: Normal breath sounds. No wheezing, rhonchi or rales.   Abdominal:      General: Bowel sounds are normal. There is no distension.      Palpations: Abdomen is soft. There is no mass.      Tenderness: There is no abdominal tenderness.      Hernia: No hernia is present.   Genitourinary:     Comments: Normal external female genitalia without lesions or masses  Speculum exam: Smooth vagina without lesions or masses, cervix normal  Bimanual exam: smooth vagina without lesions or masses, cervix smooth without masses or lesions, AV uterus, mobile mass palpated on the left    Musculoskeletal:         General: No tenderness. Normal range of motion.      Cervical back: Normal range of motion and neck supple. No tenderness.      Right lower leg: No edema.      Left lower leg: No edema.   Skin:     General: Skin is warm.      Findings: No lesion or rash.   Neurological:      General: No focal deficit present.      Mental Status: She is alert and oriented to person, place, and time.   Psychiatric:         Mood and Affect: Mood normal.         Behavior: Behavior normal.         Performance  Status:  Asymptomatic    Assessment/Plan    48 y/o F with a pelvic mass, suspect benign but desires definitive treatment with TLH BSO  possible staging  ECOG - 0   Comorbidities - anxiety, breast cancer    Diagnoses and all orders for this visit:  Pelvic mass  - We discussed the possible etiologies of a pelvic mass including benign, borderline, and malignant.   - Imaging was reviewed and discussed with the patient, suspect benign though cannot completely rule out malignancy without pathology review  - Tumor markers reviewed and  is normal  - Discussed plan for TLH BSO, possible staging procedure; patient understands risk of surgical menopause and would like to move forward with surgery  - Consent signed today   - Candidate for same day discharge  - PAT not needed    Seen w/ Dr Irena Ramirez MD MPH   Gynecologic Oncology PGY7  Pager: [phone], Team Phone: [phone]     I saw and evaluated the patient. I personally obtained the key and critical portions of the history and physical exam or was physically present for key and critical portions performed by the resident/fellow. I reviewed the resident/fellow's documentation and discussed the patient with the resident/fellow. I agree with the resident/fellow's medical decision making as documented in the note.    Esperanza Osborn MD

## 2024-03-04 ENCOUNTER — ALLIED HEALTH (OUTPATIENT)
Dept: INTEGRATIVE MEDICINE | Facility: CLINIC | Age: 49
End: 2024-03-04
Payer: COMMERCIAL

## 2024-03-04 DIAGNOSIS — G47.00 INSOMNIA, UNSPECIFIED TYPE: ICD-10-CM

## 2024-03-04 DIAGNOSIS — F41.9 ANXIETY DISORDER, UNSPECIFIED TYPE: ICD-10-CM

## 2024-03-04 DIAGNOSIS — D05.12 DUCTAL CARCINOMA IN SITU (DCIS) OF LEFT BREAST: Primary | ICD-10-CM

## 2024-03-04 DIAGNOSIS — G43.809 OTHER MIGRAINE WITHOUT STATUS MIGRAINOSUS, NOT INTRACTABLE: ICD-10-CM

## 2024-03-04 PROCEDURE — 99214 OFFICE O/P EST MOD 30 MIN: CPT | Performed by: HOSPITALIST

## 2024-03-04 NOTE — PROGRESS NOTES
Patient ID: Meryl Loaiza is a 49 y.o. female.  Referring Physician: No referring provider defined for this encounter.  Primary Care Provider: Oswaldo Dunaway MD    CANCER HISTORY:   48 yo postmenopausal woman with h/o L breast DCIS ER +, s/p L partial mastectomy 9/1/23 and adjuvant radiation 10/16/23     Current Treatment: Anastrazole     Few years ago had hot flashes, had IUD     INTEGRATIVE HISTORY:  Symptoms:  Few HA's    Vaginal bleeding while in [facility] - pelvic US with a fibroid/cyst, normal tumor markers, suggesting surgery     Hot flashes have increased, interrupting sleep - doing acupuncture, relazen, sometimes at night when eating late     Diet: Mostly plant based, whole food related  Mild sugar (baking)  Occ alcohol     PA: Teach fitness classes (QUEENIE) in [place]    Sleep: Taking anastrazole at bedtime, reading now 1 hr (usually just sleeps), waking up often, falls back asleep, partly from hot flashes     Stress: Has stress related to mom, children  Belly dancing 2 days/week, reads, walks dogs  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  There were no vitals taken for this visit.    PHYSICAL EXAM:  An interactive audio and video telecommunication system which permits real time communications between the patient (at the originating site) and provider (at the distant site) was utilized to provide this telehealth service.    Verbal consent was requested and obtained on this date for a telehealth visit.       RESULTS:  Lab Results   Component Value Date    WBC 6.5 08/22/2023    HGB 13.2 08/22/2023    HCT 38.8 08/22/2023     (L) 08/22/2023    CREATININE 0.85 08/22/2023    AST 17 07/20/2023     15.9 02/21/2024       Assessment/Plan   Cancer Staging   No  "matching staging information was found for the patient.    CANCER SPECIFIC RECCS:  50 yo postmenopausal woman with h/o L breast DCIS ER +, s/p L partial mastectomy 9/1/23 and adjuvant radiation 10/16/23     Breast cancer:  Whole Foods plant based diet   5-9 fruits/veg/day, Cruciferous Vegetables - Brussel Sprouts, Kale, Broccoli, Cauliflower  [employer] [place] [employer] \"New American Plate\"  Organic dairy preferred  Limit sugar  Consider intermittent fasting 3-5 days per week (not eating for 13 hrs straight)  Limit alcohol   Moderate soy is ok (edamame/tofu) once/day  Fiber including flax/praveen seeds beneficial     VIOME testing - will do nutregomics instead    Vag bleeding - planning TONI BSO for cyst/fibroid     Exercise 30 min/day 5 days a week, vary by balance, cardio and resistance training  Doing belly dancing lessons     Supplements to consider:  Turmeric (ashley) - taking  [employer] STAMETS 7 - taking  Melatonin 1-3 mg at night 1 hr prior to sleep - not taking  Vitamin D3 0415-8472 IU/day  Omega 3 supplementation (nordic naturals)     Reading:  Anticancer Living  Cancer Fighting Kitchen     Websites:  Anticancer Lifestyle Program - anticancerlifestyle.org  Cancerchoices.org  Cook for your Life     Podcast: Integrative Oncology Talk     Apps: Oncio flori (Oncio.org)     Support: [employer] (exercise programs, dietitian, support groups, financial support and services)     SYMPTOM MANAGEMENT:  Hot Flashes:  Acupuncture - Group acupuncture in [place] - helping  Yoga is helpful as is stress management  Consider Acteane (Boiron, homeopathic)  Relazen (Swedish Herb) - doing - having hot flashes at night  Essential oils (Peppermint)     Sleep - should try to sleep by 10 pm - worse when eating late  Limit TV or exposure to light at night including cell phones  Limit caffeine to AM only if needed (e.g. coffee)  Morning exposure to light, getting outside or bright white light in the morning  Leydi " Tea  Consider melatonin 1-3 mg 1 hr prior to sleep - not taking  Ashwaghanda (Aury) or PROZE NODZZ - taking  Essential oils - DoTerra (Lemon or lavender) in diffuser  Massage therapy  Magnesium (magnesium oxide 400 mg/day)     Exercise regularly 30min/daily  Consider breathing exercises in the morning 5 min (alternate nostril breathing)              Deep abdominal breathing (Hemant Dmitriy Breathing)  Meditation in the morning, consider mindfulness classes  Exposure to sun or bright light therapy in the morning  Yoga     Migraines - magnesium  Acupuncture     Follow up: IO follow up prn  [facility] Symptom management clinic - getting in [place]  Consider lifestyle and stress management classes in future  Willam Olea MD

## 2024-03-16 ENCOUNTER — LAB REQUISITION (OUTPATIENT)
Dept: LAB | Facility: HOSPITAL | Age: 49
End: 2024-03-16
Payer: COMMERCIAL

## 2024-03-16 ENCOUNTER — LAB (OUTPATIENT)
Dept: LAB | Facility: LAB | Age: 49
End: 2024-03-16
Payer: COMMERCIAL

## 2024-03-16 DIAGNOSIS — R19.00 INTRA-ABDOMINAL AND PELVIC SWELLING, MASS AND LUMP, UNSPECIFIED SITE: ICD-10-CM

## 2024-03-16 DIAGNOSIS — R19.00 PELVIC MASS: ICD-10-CM

## 2024-03-16 LAB
ABO GROUP (TYPE) IN BLOOD: NORMAL
ANTIBODY SCREEN: NORMAL
ERYTHROCYTE [DISTWIDTH] IN BLOOD BY AUTOMATED COUNT: 13 % (ref 11.5–14.5)
HCT VFR BLD AUTO: 40.6 % (ref 36–46)
HGB BLD-MCNC: 13.7 G/DL (ref 12–16)
MCH RBC QN AUTO: 30 PG (ref 26–34)
MCHC RBC AUTO-ENTMCNC: 33.7 G/DL (ref 32–36)
MCV RBC AUTO: 89 FL (ref 80–100)
NRBC BLD-RTO: 0 /100 WBCS (ref 0–0)
PLATELET # BLD AUTO: 126 X10*3/UL (ref 150–450)
RBC # BLD AUTO: 4.56 X10*6/UL (ref 4–5.2)
RH FACTOR (ANTIGEN D): NORMAL
WBC # BLD AUTO: 4.2 X10*3/UL (ref 4.4–11.3)

## 2024-03-16 PROCEDURE — 85027 COMPLETE CBC AUTOMATED: CPT

## 2024-03-16 PROCEDURE — 36415 COLL VENOUS BLD VENIPUNCTURE: CPT

## 2024-03-16 PROCEDURE — 86901 BLOOD TYPING SEROLOGIC RH(D): CPT | Mod: OUT | Performed by: STUDENT IN AN ORGANIZED HEALTH CARE EDUCATION/TRAINING PROGRAM

## 2024-03-26 ENCOUNTER — OFFICE VISIT (OUTPATIENT)
Age: 49
End: 2024-03-26
Payer: MEDICARE

## 2024-03-26 ENCOUNTER — ANCILLARY PROCEDURE (OUTPATIENT)
Age: 49
End: 2024-03-26
Payer: MEDICARE

## 2024-03-26 VITALS
OXYGEN SATURATION: 93 % | HEIGHT: 62 IN | BODY MASS INDEX: 27.12 KG/M2 | HEART RATE: 76 BPM | SYSTOLIC BLOOD PRESSURE: 80 MMHG | DIASTOLIC BLOOD PRESSURE: 58 MMHG | WEIGHT: 147.4 LBS

## 2024-03-26 DIAGNOSIS — Z96.41 INSULIN PUMP IN PLACE: ICD-10-CM

## 2024-03-26 DIAGNOSIS — R42 DIZZINESS: ICD-10-CM

## 2024-03-26 DIAGNOSIS — R94.31 ABNORMAL EKG: ICD-10-CM

## 2024-03-26 DIAGNOSIS — R00.1 BRADYCARDIA: ICD-10-CM

## 2024-03-26 DIAGNOSIS — E10.9 TYPE 1 DIABETES MELLITUS WITHOUT COMPLICATION (HCC): ICD-10-CM

## 2024-03-26 DIAGNOSIS — R00.1 BRADYCARDIA: Primary | ICD-10-CM

## 2024-03-26 PROCEDURE — 93005 ELECTROCARDIOGRAM TRACING: CPT | Performed by: SPECIALIST

## 2024-03-26 PROCEDURE — 2022F DILAT RTA XM EVC RTNOPTHY: CPT | Performed by: SPECIALIST

## 2024-03-26 PROCEDURE — G8427 DOCREV CUR MEDS BY ELIG CLIN: HCPCS | Performed by: SPECIALIST

## 2024-03-26 PROCEDURE — G8419 CALC BMI OUT NRM PARAM NOF/U: HCPCS | Performed by: SPECIALIST

## 2024-03-26 PROCEDURE — 3017F COLORECTAL CA SCREEN DOC REV: CPT | Performed by: SPECIALIST

## 2024-03-26 PROCEDURE — 3046F HEMOGLOBIN A1C LEVEL >9.0%: CPT | Performed by: SPECIALIST

## 2024-03-26 PROCEDURE — 1036F TOBACCO NON-USER: CPT | Performed by: SPECIALIST

## 2024-03-26 PROCEDURE — G8484 FLU IMMUNIZE NO ADMIN: HCPCS | Performed by: SPECIALIST

## 2024-03-26 PROCEDURE — 93010 ELECTROCARDIOGRAM REPORT: CPT | Performed by: SPECIALIST

## 2024-03-26 PROCEDURE — 99204 OFFICE O/P NEW MOD 45 MIN: CPT | Performed by: SPECIALIST

## 2024-03-26 PROCEDURE — 93242 EXT ECG>48HR<7D RECORDING: CPT | Performed by: INTERNAL MEDICINE

## 2024-03-26 ASSESSMENT — PATIENT HEALTH QUESTIONNAIRE - PHQ9
1. LITTLE INTEREST OR PLEASURE IN DOING THINGS: NOT AT ALL
SUM OF ALL RESPONSES TO PHQ QUESTIONS 1-9: 0
SUM OF ALL RESPONSES TO PHQ9 QUESTIONS 1 & 2: 0
2. FEELING DOWN, DEPRESSED OR HOPELESS: NOT AT ALL

## 2024-03-26 NOTE — PROGRESS NOTES
EVERY 8 HOURS PRN    sertraline (ZOLOFT) 50 mg, DAILY    traZODone (DESYREL) 200 mg, Oral      No Known Allergies  Past Medical History:   Diagnosis Date    Anxiety     Depression     Diabetes (HCC)     Hypercholesterolemia     Hypothyroid     Ill-defined condition     pancreatitis 2015    Liver disease     \"liver failure\" 2015    Long term (current) use of anticoagulants     SBO (small bowel obstruction) (HCC)     Thromboembolus (HCC)      Past Surgical History:   Procedure Laterality Date    CHOLECYSTECTOMY      HYSTERECTOMY (CERVIX STATUS UNKNOWN)      TN UNLISTED PROCEDURE ABDOMEN PERITONEUM & OMENTUM      Pt stated \"I have no pancreas\"     SPLENECTOMY       Family History   Problem Relation Age of Onset    Cancer Maternal Grandmother     Cancer Maternal Aunt     Cancer Mother     Cancer Other     Hypertension Other      Social History     Tobacco Use    Smoking status: Former     Current packs/day: 1.00     Types: Cigarettes    Smokeless tobacco: Never   Substance Use Topics    Alcohol use: No       Review of Systems   Constitutional: Positive for malaise/fatigue.   Psychiatric/Behavioral:  Positive for memory loss.    All other systems reviewed and are negative.       BP (!) 80/58 (Site: Left Upper Arm, Position: Sitting, Cuff Size: Medium Adult)   Pulse 76   Ht 1.575 m (5' 2\")   Wt 66.9 kg (147 lb 6.4 oz)   SpO2 93%   BMI 26.96 kg/m²    Physical Exam  Vitals and nursing note reviewed.   Constitutional:       Appearance: Normal appearance.   HENT:      Head: Normocephalic.      Right Ear: External ear normal.      Left Ear: External ear normal.      Nose: Nose normal.      Mouth/Throat:      Mouth: Mucous membranes are moist.   Eyes:      Extraocular Movements: Extraocular movements intact.   Cardiovascular:      Rate and Rhythm: Normal rate and regular rhythm.      Heart sounds: Normal heart sounds.   Pulmonary:      Breath sounds: Normal breath sounds.   Abdominal:      Palpations: Abdomen is soft.

## 2024-03-27 NOTE — HOSPITAL COURSE
49 y.o. with pelvic mass, suspect benign, desires definitive treatment presenting for TLH, BSO, possible staging procedure.    Relevant Labs/Imaging/Pathology:   24: 15.9    US Pelvis 24:  IMPRESSION:  Left ovary contains 2 separate lesions, 1 which is solid and is  compatible with an over rads 3 lesion and another which is cystic  containing solid components compatible with an O rads 4 lesion.    Past Medical History  Breast cancer  Anxiety      Past Surgical History  Mastectomy L  Breast reduction   D&C     OB/GYN History  The patient is a  s/p . She underwent menopause unknown, no VMS and amenorrhea on LNG-IUD s/p removal. She did not use HRT.     Social History  Social Connections: Not on file   The patient lives at home with angel, . The patient works at the [employer]     Family History  Cancer-related family history is not on file.   Paternal grandfather diagnosed w/ colon cancer age 60  Mom thyroid cancer      Screening  Cervical cancer: UTD normal  Mammogram:  23 BIRADS-4   Colonoscopy: cologuard 6/15/22 negative

## 2024-03-31 ENCOUNTER — ANESTHESIA EVENT (OUTPATIENT)
Dept: OPERATING ROOM | Facility: HOSPITAL | Age: 49
End: 2024-03-31
Payer: COMMERCIAL

## 2024-03-31 NOTE — H&P
History Of Present Illness    Meryl Loaiza is a 49 y.o. female presenting  with pelvic mass, suspect benign, desires definitive treatment presenting for TLH, BSO, possible staging procedure.    Relevant Labs/Imaging/Pathology:   24: 15.9    US Pelvis 24:  IMPRESSION:  Left ovary contains 2 separate lesions, 1 which is solid and is  compatible with an over rads 3 lesion and another which is cystic  containing solid components compatible with an O rads 4 lesion.    PAT: not needed    Past Medical History  Breast cancer  Anxiety      Past Surgical History  Mastectomy L  Breast reduction   D&C     OB/GYN History  The patient is a  s/p . She underwent menopause unknown, no VMS and amenorrhea on LNG-IUD s/p removal. She did not use HRT.     Social History     The patient lives at home with angel, . The patient works at the [employer]     Family History  Cancer-related family history is not on file.   Paternal grandfather diagnosed w/ colon cancer age 60  Mom thyroid cancer      Screening  Cervical cancer: UTD normal  Mammogram:  23 BIRADS-4   Colonoscopy: cologuard 6/15/22 negative  Allergies  Patient has no known allergies.    Review of Systems   Negative except as above    Physical Exam  General: no acute distress  HEENT: normocephalic, atraumatic  Heart: warm and well perfused  Lungs: no increased WOB  Abd: soft, nontender  Extremities: moving all extremities  Neuro: awake and conversant  Psych: appropriate mood and affect     Last Recorded Vitals  There were no vitals taken for this visit.    Relevant Results  Lab Results   Component Value Date    WBC 4.2 (L) 2024    HGB 13.7 2024    HCT 40.6 2024    MCV 89 2024     (L) 2024      15.9     Assessment/Plan   Principal Problem:    Pelvic mass  Meryl Loaiza is a 49 y.o. female presenting  with pelvic mass, suspect benign, desires definitive treatment presenting for TLH, BSO,  possible staging procedure.    - Consent signed , in chart  - Pre-procedure medications ordered  - Plan for same-day discharge    Seen and evaluated with Dr. Irena Rockwell MD PGY1

## 2024-04-01 ENCOUNTER — ANESTHESIA (OUTPATIENT)
Dept: OPERATING ROOM | Facility: HOSPITAL | Age: 49
End: 2024-04-01
Payer: COMMERCIAL

## 2024-04-01 ENCOUNTER — HOSPITAL ENCOUNTER (OUTPATIENT)
Facility: HOSPITAL | Age: 49
Setting detail: OUTPATIENT SURGERY
Discharge: HOME | End: 2024-04-01
Attending: STUDENT IN AN ORGANIZED HEALTH CARE EDUCATION/TRAINING PROGRAM | Admitting: STUDENT IN AN ORGANIZED HEALTH CARE EDUCATION/TRAINING PROGRAM
Payer: COMMERCIAL

## 2024-04-01 VITALS
HEART RATE: 60 BPM | RESPIRATION RATE: 16 BRPM | DIASTOLIC BLOOD PRESSURE: 55 MMHG | SYSTOLIC BLOOD PRESSURE: 101 MMHG | WEIGHT: 138.89 LBS | TEMPERATURE: 97.7 F | OXYGEN SATURATION: 100 % | BODY MASS INDEX: 23.14 KG/M2 | HEIGHT: 65 IN

## 2024-04-01 DIAGNOSIS — R19.00 PELVIC MASS: ICD-10-CM

## 2024-04-01 LAB
ABO GROUP (TYPE) IN BLOOD: NORMAL
PREGNANCY TEST URINE, POC: NEGATIVE
RH FACTOR (ANTIGEN D): NORMAL

## 2024-04-01 PROCEDURE — 81025 URINE PREGNANCY TEST: CPT | Performed by: STUDENT IN AN ORGANIZED HEALTH CARE EDUCATION/TRAINING PROGRAM

## 2024-04-01 PROCEDURE — 2500000005 HC RX 250 GENERAL PHARMACY W/O HCPCS

## 2024-04-01 PROCEDURE — 88112 CYTOPATH CELL ENHANCE TECH: CPT | Mod: TC,MCY | Performed by: STUDENT IN AN ORGANIZED HEALTH CARE EDUCATION/TRAINING PROGRAM

## 2024-04-01 PROCEDURE — 2720000007 HC OR 272 NO HCPCS: Performed by: STUDENT IN AN ORGANIZED HEALTH CARE EDUCATION/TRAINING PROGRAM

## 2024-04-01 PROCEDURE — 2500000004 HC RX 250 GENERAL PHARMACY W/ HCPCS (ALT 636 FOR OP/ED)

## 2024-04-01 PROCEDURE — 3700000002 HC GENERAL ANESTHESIA TIME - EACH INCREMENTAL 1 MINUTE: Performed by: STUDENT IN AN ORGANIZED HEALTH CARE EDUCATION/TRAINING PROGRAM

## 2024-04-01 PROCEDURE — 88307 TISSUE EXAM BY PATHOLOGIST: CPT | Performed by: PATHOLOGY

## 2024-04-01 PROCEDURE — 7100000009 HC PHASE TWO TIME - INITIAL BASE CHARGE: Performed by: STUDENT IN AN ORGANIZED HEALTH CARE EDUCATION/TRAINING PROGRAM

## 2024-04-01 PROCEDURE — 2500000004 HC RX 250 GENERAL PHARMACY W/ HCPCS (ALT 636 FOR OP/ED): Performed by: STUDENT IN AN ORGANIZED HEALTH CARE EDUCATION/TRAINING PROGRAM

## 2024-04-01 PROCEDURE — 58571 TLH W/T/O 250 G OR LESS: CPT | Performed by: STUDENT IN AN ORGANIZED HEALTH CARE EDUCATION/TRAINING PROGRAM

## 2024-04-01 PROCEDURE — 99222 1ST HOSP IP/OBS MODERATE 55: CPT | Performed by: STUDENT IN AN ORGANIZED HEALTH CARE EDUCATION/TRAINING PROGRAM

## 2024-04-01 PROCEDURE — A58571 PR LAPAROSCOPY W TOT HYSTERECTUTERUS <=250 GRAM  W TUBE/OVARY: Performed by: ANESTHESIOLOGY

## 2024-04-01 PROCEDURE — 7100000001 HC RECOVERY ROOM TIME - INITIAL BASE CHARGE: Performed by: STUDENT IN AN ORGANIZED HEALTH CARE EDUCATION/TRAINING PROGRAM

## 2024-04-01 PROCEDURE — 64450 NJX AA&/STRD OTHER PN/BRANCH: CPT | Performed by: ANESTHESIOLOGY

## 2024-04-01 PROCEDURE — 88112 CYTOPATH CELL ENHANCE TECH: CPT | Performed by: PATHOLOGY

## 2024-04-01 PROCEDURE — 3600000009 HC OR TIME - EACH INCREMENTAL 1 MINUTE - PROCEDURE LEVEL FOUR: Performed by: STUDENT IN AN ORGANIZED HEALTH CARE EDUCATION/TRAINING PROGRAM

## 2024-04-01 PROCEDURE — 96372 THER/PROPH/DIAG INJ SC/IM: CPT | Performed by: STUDENT IN AN ORGANIZED HEALTH CARE EDUCATION/TRAINING PROGRAM

## 2024-04-01 PROCEDURE — 36415 COLL VENOUS BLD VENIPUNCTURE: CPT | Performed by: STUDENT IN AN ORGANIZED HEALTH CARE EDUCATION/TRAINING PROGRAM

## 2024-04-01 PROCEDURE — 7100000002 HC RECOVERY ROOM TIME - EACH INCREMENTAL 1 MINUTE: Performed by: STUDENT IN AN ORGANIZED HEALTH CARE EDUCATION/TRAINING PROGRAM

## 2024-04-01 PROCEDURE — 2500000004 HC RX 250 GENERAL PHARMACY W/ HCPCS (ALT 636 FOR OP/ED): Performed by: ANESTHESIOLOGY

## 2024-04-01 PROCEDURE — 2500000001 HC RX 250 WO HCPCS SELF ADMINISTERED DRUGS (ALT 637 FOR MEDICARE OP): Performed by: STUDENT IN AN ORGANIZED HEALTH CARE EDUCATION/TRAINING PROGRAM

## 2024-04-01 PROCEDURE — 88307 TISSUE EXAM BY PATHOLOGIST: CPT | Mod: TC,SUR,WESLAB | Performed by: STUDENT IN AN ORGANIZED HEALTH CARE EDUCATION/TRAINING PROGRAM

## 2024-04-01 PROCEDURE — P9045 ALBUMIN (HUMAN), 5%, 250 ML: HCPCS | Mod: JZ,JG

## 2024-04-01 PROCEDURE — 3600000004 HC OR TIME - INITIAL BASE CHARGE - PROCEDURE LEVEL FOUR: Performed by: STUDENT IN AN ORGANIZED HEALTH CARE EDUCATION/TRAINING PROGRAM

## 2024-04-01 PROCEDURE — 7100000010 HC PHASE TWO TIME - EACH INCREMENTAL 1 MINUTE: Performed by: STUDENT IN AN ORGANIZED HEALTH CARE EDUCATION/TRAINING PROGRAM

## 2024-04-01 PROCEDURE — 2500000002 HC RX 250 W HCPCS SELF ADMINISTERED DRUGS (ALT 637 FOR MEDICARE OP, ALT 636 FOR OP/ED): Performed by: ANESTHESIOLOGY

## 2024-04-01 PROCEDURE — A4217 STERILE WATER/SALINE, 500 ML: HCPCS | Performed by: STUDENT IN AN ORGANIZED HEALTH CARE EDUCATION/TRAINING PROGRAM

## 2024-04-01 PROCEDURE — 3700000001 HC GENERAL ANESTHESIA TIME - INITIAL BASE CHARGE: Performed by: STUDENT IN AN ORGANIZED HEALTH CARE EDUCATION/TRAINING PROGRAM

## 2024-04-01 RX ORDER — SCOLOPAMINE TRANSDERMAL SYSTEM 1 MG/1
1 PATCH, EXTENDED RELEASE TRANSDERMAL
Status: DISCONTINUED | OUTPATIENT
Start: 2024-04-01 | End: 2024-04-01 | Stop reason: HOSPADM

## 2024-04-01 RX ORDER — ROCURONIUM BROMIDE 10 MG/ML
INJECTION, SOLUTION INTRAVENOUS AS NEEDED
Status: DISCONTINUED | OUTPATIENT
Start: 2024-04-01 | End: 2024-04-01

## 2024-04-01 RX ORDER — TRAMADOL HYDROCHLORIDE 50 MG/1
50 TABLET ORAL EVERY 6 HOURS PRN
Qty: 12 TABLET | Refills: 0 | Status: SHIPPED | OUTPATIENT
Start: 2024-04-01 | End: 2024-04-08

## 2024-04-01 RX ORDER — ACETAMINOPHEN 325 MG/1
1000 TABLET ORAL EVERY 6 HOURS PRN
Qty: 20 TABLET | Refills: 0 | Status: SHIPPED | OUTPATIENT
Start: 2024-04-01 | End: 2024-04-11

## 2024-04-01 RX ORDER — ASPIRIN 81 MG
100 TABLET, DELAYED RELEASE (ENTERIC COATED) ORAL 2 TIMES DAILY
Qty: 10 TABLET | Refills: 0 | Status: SHIPPED | OUTPATIENT
Start: 2024-04-01 | End: 2024-04-06

## 2024-04-01 RX ORDER — CELECOXIB 200 MG/1
400 CAPSULE ORAL ONCE
Status: CANCELLED | OUTPATIENT
Start: 2024-04-01 | End: 2024-04-01

## 2024-04-01 RX ORDER — PHENYLEPHRINE HCL IN 0.9% NACL 0.4MG/10ML
SYRINGE (ML) INTRAVENOUS AS NEEDED
Status: DISCONTINUED | OUTPATIENT
Start: 2024-04-01 | End: 2024-04-01

## 2024-04-01 RX ORDER — OXYCODONE HYDROCHLORIDE 5 MG/1
5 TABLET ORAL EVERY 4 HOURS PRN
Status: DISCONTINUED | OUTPATIENT
Start: 2024-04-01 | End: 2024-04-01 | Stop reason: HOSPADM

## 2024-04-01 RX ORDER — OXYCODONE HYDROCHLORIDE 5 MG/1
10 TABLET ORAL EVERY 4 HOURS PRN
Status: DISCONTINUED | OUTPATIENT
Start: 2024-04-01 | End: 2024-04-01 | Stop reason: HOSPADM

## 2024-04-01 RX ORDER — FENTANYL CITRATE 50 UG/ML
INJECTION, SOLUTION INTRAMUSCULAR; INTRAVENOUS AS NEEDED
Status: DISCONTINUED | OUTPATIENT
Start: 2024-04-01 | End: 2024-04-01

## 2024-04-01 RX ORDER — PROPOFOL 10 MG/ML
INJECTION, EMULSION INTRAVENOUS CONTINUOUS PRN
Status: DISCONTINUED | OUTPATIENT
Start: 2024-04-01 | End: 2024-04-01

## 2024-04-01 RX ORDER — LIDOCAINE HYDROCHLORIDE 20 MG/ML
INJECTION, SOLUTION INFILTRATION; PERINEURAL AS NEEDED
Status: DISCONTINUED | OUTPATIENT
Start: 2024-04-01 | End: 2024-04-01

## 2024-04-01 RX ORDER — SODIUM CHLORIDE 0.9 G/100ML
IRRIGANT IRRIGATION AS NEEDED
Status: DISCONTINUED | OUTPATIENT
Start: 2024-04-01 | End: 2024-04-01 | Stop reason: HOSPADM

## 2024-04-01 RX ORDER — ACETAMINOPHEN 325 MG/1
975 TABLET ORAL ONCE
Status: CANCELLED | OUTPATIENT
Start: 2024-04-01 | End: 2024-04-01

## 2024-04-01 RX ORDER — NORETHINDRONE AND ETHINYL ESTRADIOL 0.5-0.035
KIT ORAL AS NEEDED
Status: DISCONTINUED | OUTPATIENT
Start: 2024-04-01 | End: 2024-04-01

## 2024-04-01 RX ORDER — LIDOCAINE HYDROCHLORIDE 10 MG/ML
0.1 INJECTION, SOLUTION EPIDURAL; INFILTRATION; INTRACAUDAL; PERINEURAL ONCE
Status: DISCONTINUED | OUTPATIENT
Start: 2024-04-01 | End: 2024-04-01 | Stop reason: HOSPADM

## 2024-04-01 RX ORDER — HYDROMORPHONE HYDROCHLORIDE 1 MG/ML
0.4 INJECTION, SOLUTION INTRAMUSCULAR; INTRAVENOUS; SUBCUTANEOUS EVERY 5 MIN PRN
Status: DISCONTINUED | OUTPATIENT
Start: 2024-04-01 | End: 2024-04-01 | Stop reason: HOSPADM

## 2024-04-01 RX ORDER — IBUPROFEN 600 MG/1
600 TABLET ORAL EVERY 6 HOURS PRN
Qty: 20 TABLET | Refills: 0 | Status: SHIPPED | OUTPATIENT
Start: 2024-04-01 | End: 2024-04-11

## 2024-04-01 RX ORDER — HYDROMORPHONE HYDROCHLORIDE 1 MG/ML
0.2 INJECTION, SOLUTION INTRAMUSCULAR; INTRAVENOUS; SUBCUTANEOUS EVERY 5 MIN PRN
Status: DISCONTINUED | OUTPATIENT
Start: 2024-04-01 | End: 2024-04-01 | Stop reason: HOSPADM

## 2024-04-01 RX ORDER — GABAPENTIN 600 MG/1
600 TABLET ORAL ONCE
Status: CANCELLED | OUTPATIENT
Start: 2024-04-01 | End: 2024-04-01

## 2024-04-01 RX ORDER — ACETAMINOPHEN 325 MG/1
650 TABLET ORAL EVERY 4 HOURS PRN
Status: DISCONTINUED | OUTPATIENT
Start: 2024-04-01 | End: 2024-04-01 | Stop reason: HOSPADM

## 2024-04-01 RX ORDER — ROPIVACAINE HYDROCHLORIDE 5 MG/ML
INJECTION, SOLUTION EPIDURAL; INFILTRATION; PERINEURAL AS NEEDED
Status: DISCONTINUED | OUTPATIENT
Start: 2024-04-01 | End: 2024-04-01

## 2024-04-01 RX ORDER — ONDANSETRON HYDROCHLORIDE 2 MG/ML
4 INJECTION, SOLUTION INTRAVENOUS ONCE AS NEEDED
Status: COMPLETED | OUTPATIENT
Start: 2024-04-01 | End: 2024-04-01

## 2024-04-01 RX ORDER — CEFAZOLIN 1 G/1
INJECTION, POWDER, FOR SOLUTION INTRAVENOUS AS NEEDED
Status: DISCONTINUED | OUTPATIENT
Start: 2024-04-01 | End: 2024-04-01

## 2024-04-01 RX ORDER — APREPITANT 40 MG/1
40 CAPSULE ORAL DAILY
Status: DISCONTINUED | OUTPATIENT
Start: 2024-04-01 | End: 2024-04-01 | Stop reason: HOSPADM

## 2024-04-01 RX ORDER — GABAPENTIN 600 MG/1
600 TABLET ORAL ONCE
Status: COMPLETED | OUTPATIENT
Start: 2024-04-01 | End: 2024-04-01

## 2024-04-01 RX ORDER — ALBUMIN HUMAN 50 G/1000ML
SOLUTION INTRAVENOUS AS NEEDED
Status: DISCONTINUED | OUTPATIENT
Start: 2024-04-01 | End: 2024-04-01

## 2024-04-01 RX ORDER — PROPOFOL 10 MG/ML
INJECTION, EMULSION INTRAVENOUS AS NEEDED
Status: DISCONTINUED | OUTPATIENT
Start: 2024-04-01 | End: 2024-04-01

## 2024-04-01 RX ORDER — POLYETHYLENE GLYCOL 3350 17 G/17G
17 POWDER, FOR SOLUTION ORAL DAILY PRN
Qty: 10 PACKET | Refills: 0 | Status: SHIPPED | OUTPATIENT
Start: 2024-04-01 | End: 2024-04-04

## 2024-04-01 RX ORDER — METHOCARBAMOL 100 MG/ML
1000 INJECTION, SOLUTION INTRAMUSCULAR; INTRAVENOUS ONCE
Status: DISCONTINUED | OUTPATIENT
Start: 2024-04-01 | End: 2024-04-01 | Stop reason: HOSPADM

## 2024-04-01 RX ORDER — HEPARIN SODIUM 5000 [USP'U]/ML
5000 INJECTION, SOLUTION INTRAVENOUS; SUBCUTANEOUS ONCE
Status: CANCELLED | OUTPATIENT
Start: 2024-04-01 | End: 2024-04-01

## 2024-04-01 RX ORDER — HEPARIN SODIUM 5000 [USP'U]/ML
5000 INJECTION, SOLUTION INTRAVENOUS; SUBCUTANEOUS ONCE
Status: COMPLETED | OUTPATIENT
Start: 2024-04-01 | End: 2024-04-01

## 2024-04-01 RX ORDER — MIDAZOLAM HYDROCHLORIDE 1 MG/ML
INJECTION INTRAMUSCULAR; INTRAVENOUS AS NEEDED
Status: DISCONTINUED | OUTPATIENT
Start: 2024-04-01 | End: 2024-04-01

## 2024-04-01 RX ORDER — ONDANSETRON HYDROCHLORIDE 2 MG/ML
INJECTION, SOLUTION INTRAVENOUS AS NEEDED
Status: DISCONTINUED | OUTPATIENT
Start: 2024-04-01 | End: 2024-04-01

## 2024-04-01 RX ORDER — SODIUM CHLORIDE, SODIUM LACTATE, POTASSIUM CHLORIDE, CALCIUM CHLORIDE 600; 310; 30; 20 MG/100ML; MG/100ML; MG/100ML; MG/100ML
100 INJECTION, SOLUTION INTRAVENOUS CONTINUOUS
Status: DISCONTINUED | OUTPATIENT
Start: 2024-04-01 | End: 2024-04-01 | Stop reason: HOSPADM

## 2024-04-01 RX ORDER — HYDROMORPHONE HYDROCHLORIDE 1 MG/ML
INJECTION, SOLUTION INTRAMUSCULAR; INTRAVENOUS; SUBCUTANEOUS AS NEEDED
Status: DISCONTINUED | OUTPATIENT
Start: 2024-04-01 | End: 2024-04-01

## 2024-04-01 RX ORDER — CELECOXIB 200 MG/1
400 CAPSULE ORAL ONCE
Status: COMPLETED | OUTPATIENT
Start: 2024-04-01 | End: 2024-04-01

## 2024-04-01 RX ORDER — ACETAMINOPHEN 325 MG/1
975 TABLET ORAL ONCE
Status: COMPLETED | OUTPATIENT
Start: 2024-04-01 | End: 2024-04-01

## 2024-04-01 RX ORDER — GLYCOPYRROLATE 0.2 MG/ML
INJECTION INTRAMUSCULAR; INTRAVENOUS AS NEEDED
Status: DISCONTINUED | OUTPATIENT
Start: 2024-04-01 | End: 2024-04-01

## 2024-04-01 RX ADMIN — ROPIVACAINE HYDROCHLORIDE 15 ML: 5 INJECTION, SOLUTION EPIDURAL; INFILTRATION; PERINEURAL at 09:42

## 2024-04-01 RX ADMIN — PROPOFOL 150 MG: 10 INJECTION, EMULSION INTRAVENOUS at 12:27

## 2024-04-01 RX ADMIN — HYDROMORPHONE HYDROCHLORIDE 0.2 MG: 1 INJECTION, SOLUTION INTRAMUSCULAR; INTRAVENOUS; SUBCUTANEOUS at 14:25

## 2024-04-01 RX ADMIN — ACETAMINOPHEN 650 MG: 325 TABLET ORAL at 17:04

## 2024-04-01 RX ADMIN — APREPITANT 40 MG: 40 CAPSULE ORAL at 10:40

## 2024-04-01 RX ADMIN — EPHEDRINE SULFATE 5 MG: 50 INJECTION, SOLUTION INTRAVENOUS at 14:30

## 2024-04-01 RX ADMIN — DEXAMETHASONE SODIUM PHOSPHATE 8 MG: 4 INJECTION INTRA-ARTICULAR; INTRALESIONAL; INTRAMUSCULAR; INTRAVENOUS; SOFT TISSUE at 12:41

## 2024-04-01 RX ADMIN — EPHEDRINE SULFATE 5 MG: 50 INJECTION, SOLUTION INTRAVENOUS at 13:34

## 2024-04-01 RX ADMIN — EPHEDRINE SULFATE 5 MG: 50 INJECTION, SOLUTION INTRAVENOUS at 13:48

## 2024-04-01 RX ADMIN — LIDOCAINE HYDROCHLORIDE 60 MG: 20 INJECTION, SOLUTION INFILTRATION; PERINEURAL at 12:27

## 2024-04-01 RX ADMIN — GLYCOPYRROLATE 0.2 MG: 0.2 INJECTION INTRAMUSCULAR; INTRAVENOUS at 12:53

## 2024-04-01 RX ADMIN — SCOPALAMINE 1 PATCH: 1 PATCH, EXTENDED RELEASE TRANSDERMAL at 11:00

## 2024-04-01 RX ADMIN — ACETAMINOPHEN 975 MG: 325 TABLET ORAL at 10:00

## 2024-04-01 RX ADMIN — ROPIVACAINE HYDROCHLORIDE 15 ML: 5 INJECTION, SOLUTION EPIDURAL; INFILTRATION; PERINEURAL at 09:48

## 2024-04-01 RX ADMIN — GABAPENTIN 600 MG: 600 TABLET, FILM COATED ORAL at 10:39

## 2024-04-01 RX ADMIN — MIDAZOLAM HYDROCHLORIDE 2 MG: 1 INJECTION, SOLUTION INTRAMUSCULAR; INTRAVENOUS at 09:36

## 2024-04-01 RX ADMIN — SUGAMMADEX 200 MG: 100 INJECTION, SOLUTION INTRAVENOUS at 14:38

## 2024-04-01 RX ADMIN — ONDANSETRON 4 MG: 2 INJECTION INTRAMUSCULAR; INTRAVENOUS at 17:26

## 2024-04-01 RX ADMIN — CEFAZOLIN 2 G: 1 INJECTION, POWDER, FOR SOLUTION INTRAMUSCULAR; INTRAVENOUS at 12:42

## 2024-04-01 RX ADMIN — Medication 120 MCG: at 13:18

## 2024-04-01 RX ADMIN — Medication 80 MCG: at 12:53

## 2024-04-01 RX ADMIN — HEPARIN SODIUM 5000 UNITS: 5000 INJECTION INTRAVENOUS; SUBCUTANEOUS at 10:38

## 2024-04-01 RX ADMIN — FENTANYL CITRATE 50 MCG: 50 INJECTION, SOLUTION INTRAMUSCULAR; INTRAVENOUS at 12:27

## 2024-04-01 RX ADMIN — HYDROMORPHONE HYDROCHLORIDE 0.3 MG: 1 INJECTION, SOLUTION INTRAMUSCULAR; INTRAVENOUS; SUBCUTANEOUS at 14:29

## 2024-04-01 RX ADMIN — ROCURONIUM BROMIDE 5 MG: 10 INJECTION INTRAVENOUS at 14:04

## 2024-04-01 RX ADMIN — CELECOXIB 400 MG: 200 CAPSULE ORAL at 10:37

## 2024-04-01 RX ADMIN — PROPOFOL 35 MCG/KG/MIN: 10 INJECTION, EMULSION INTRAVENOUS at 12:47

## 2024-04-01 RX ADMIN — ONDANSETRON 4 MG: 2 INJECTION INTRAMUSCULAR; INTRAVENOUS at 14:20

## 2024-04-01 RX ADMIN — Medication 160 MCG: at 13:04

## 2024-04-01 RX ADMIN — ALBUMIN HUMAN 250 ML: 0.05 INJECTION, SOLUTION INTRAVENOUS at 13:33

## 2024-04-01 RX ADMIN — SODIUM CHLORIDE, SODIUM LACTATE, POTASSIUM CHLORIDE, AND CALCIUM CHLORIDE: 600; 310; 30; 20 INJECTION, SOLUTION INTRAVENOUS at 12:16

## 2024-04-01 RX ADMIN — ROCURONIUM BROMIDE 50 MG: 10 INJECTION INTRAVENOUS at 12:28

## 2024-04-01 SDOH — HEALTH STABILITY: MENTAL HEALTH: CURRENT SMOKER: 0

## 2024-04-01 ASSESSMENT — PAIN DESCRIPTION - DESCRIPTORS
DESCRIPTORS: ACHING
DESCRIPTORS: DISCOMFORT

## 2024-04-01 ASSESSMENT — PAIN SCALES - GENERAL
PAINLEVEL_OUTOF10: 2
PAINLEVEL_OUTOF10: 2
PAINLEVEL_OUTOF10: 0 - NO PAIN
PAINLEVEL_OUTOF10: 2
PAINLEVEL_OUTOF10: 0 - NO PAIN

## 2024-04-01 ASSESSMENT — COLUMBIA-SUICIDE SEVERITY RATING SCALE - C-SSRS
1. IN THE PAST MONTH, HAVE YOU WISHED YOU WERE DEAD OR WISHED YOU COULD GO TO SLEEP AND NOT WAKE UP?: NO
2. HAVE YOU ACTUALLY HAD ANY THOUGHTS OF KILLING YOURSELF?: NO
6. HAVE YOU EVER DONE ANYTHING, STARTED TO DO ANYTHING, OR PREPARED TO DO ANYTHING TO END YOUR LIFE?: NO

## 2024-04-01 NOTE — ANESTHESIA PROCEDURE NOTES
Airway  Date/Time: 4/1/2024 12:29 PM  Urgency: elective    Airway not difficult    Staffing  Performed: resident   Authorized by: Ling Hunt MD    Performed by: Grupo Fleming MD  Patient location during procedure: OR    Indications and Patient Condition  Indications for airway management: anesthesia  Spontaneous Ventilation: absent  Sedation level: deep  Preoxygenated: yes  Patient position: sniffing  Mask difficulty assessment: 1 - vent by mask  Planned trial extubation    Final Airway Details  Final airway type: endotracheal airway      Successful airway: ETT  Cuffed: yes   Successful intubation technique: direct laryngoscopy  Facilitating devices/methods: intubating stylet  Endotracheal tube insertion site: oral  Blade: Viri  Blade size: #3  ETT size (mm): 7.0  Placement verified by: chest auscultation, capnometry and palpation of cuff   Measured from: lips  ETT to lips (cm): 22  Number of attempts at approach: 1

## 2024-04-01 NOTE — ANESTHESIA PROCEDURE NOTES
Peripheral IV  Date/Time: 4/1/2024 12:39 PM      Placement  Needle size: 18 G  Laterality: left  Location: wrist  Site prep: chlorhexidine  Technique: anatomical landmarks  Attempts: 1

## 2024-04-01 NOTE — ANESTHESIA PROCEDURE NOTES
Peripheral Block    Patient location during procedure: pre-op  Start time: 4/1/2024 9:35 AM  End time: 4/1/2024 9:50 AM  Reason for block: at surgeon's request  Staffing  Performed: resident   Authorized by: Evette Miller MD    Performed by: Evette Miller MD  Preanesthetic Checklist  Completed: patient identified, IV checked, site marked, risks and benefits discussed, surgical consent, monitors and equipment checked, pre-op evaluation and timeout performed   Timeout performed at: 4/1/2024 9:35 AM  Peripheral Block  Patient position: laying flat  Prep: ChloraPrep  Patient monitoring: heart rate and continuous pulse ox  Block type: QL  Laterality: B/L  Injection technique: single-shot  Guidance: ultrasound guided  Local infiltration: lidocaine  Needle  Needle type: Tuohy   Needle gauge: 26 G  Needle length: 8 cm  Needle localization: ultrasound guidance     image stored in chart  Assessment  Injection assessment: negative aspiration for heme, no paresthesia on injection, incremental injection and local visualized surrounding nerve on ultrasound  Heart rate change: no  Slow fractionated injection: no  Additional Notes  QL single shot. informed consent obtained. risks and benefits discussed. ASA monitors placed, timeout performed. Pt positioned, prepped with chlorhexidine, draped with sterile towels. Ultrasound guidance used with visualization of the needle throughout duration of the procedure. Aspiration was negative. A total of 30 cc 0.5% ropivacaine, 100mcg epinephrine, and 4mg decadron injected between both sides. Patient tolerated procedure well.     Timeout by SPRING Morrell

## 2024-04-01 NOTE — ANESTHESIA POSTPROCEDURE EVALUATION
Patient: Meryl Loaiza    Procedure Summary       Date: 04/01/24 Room / Location: [facility] OR 03 / Virtual [facility] OR    Anesthesia Start: 1217 Anesthesia Stop: 1457    Procedure: Total Laparoscopic Hysterectomy, [id number] Diagnosis:       Pelvic mass      (Pelvic mass [R19.00])    Surgeons: Esperanza Osborn MD Responsible Provider: Ling Hunt MD    Anesthesia Type: general ASA Status: 2            Anesthesia Type: general    Vitals Value Taken Time   /56 04/01/24 1457   Temp 36.9C 04/01/24 1457   Pulse 54 04/01/24 1457   Resp 10 04/01/24 1457   SpO2 100% 04/01/24 1457       Anesthesia Post Evaluation    Patient location during evaluation: PACU  Patient participation: complete - patient participated  Level of consciousness: responsive to verbal stimuli  Pain management: adequate  Airway patency: patent  Cardiovascular status: blood pressure returned to baseline and acceptable  Respiratory status: acceptable and face mask  Hydration status: acceptable  Postoperative Nausea and Vomiting: none        No notable events documented.

## 2024-04-01 NOTE — ANESTHESIA PREPROCEDURE EVALUATION
Patient: Meryl Loaiza    Procedure Information       Date/Time: 04/01/24 1025    Procedure: Hysterectomy Laparoscopy    Location: [facility] OR 03 / Virtual [facility] OR    Surgeons: Esperanza Osborn MD        There were no vitals filed for this visit.    Past Surgical History:   Procedure Laterality Date   • BREAST SURGERY  12/1993   • OTHER SURGICAL HISTORY  08/22/2019    Breast reduction   • WISDOM TOOTH EXTRACTION  2008?     Past Medical History:   Diagnosis Date   • Achilles tendinitis, left leg     Achilles tendinitis of left lower extremity   • Anxiety    • Migraine, unspecified, not intractable, without status migrainosus 08/22/2019    Migraine   • Nevus 06/12/2023   • Thrombocytopenia (CMS/HCC) 06/12/2023   • Weight gain 06/12/2023     No current facility-administered medications for this encounter.    Current Outpatient Medications:   •  anastrozole (Arimidex) 1 mg tablet, Take 1 tablet (1 mg total) by mouth once daily.  Swallow whole with a drink of water., Disp: 90 tablet, Rfl: 3  •  multivitamin tablet, Take 1 tablet by mouth once daily., Disp: , Rfl:   •  omega-3/dha/epa/fish oil (OMEGA-3 ORAL), Take by mouth., Disp: , Rfl:   •  SUMAtriptan (Imitrex) 25 mg tablet, Take by mouth twice a day., Disp: , Rfl:   Prior to Admission medications    Medication Sig Start Date End Date Taking? Authorizing Provider   anastrozole (Arimidex) 1 mg tablet Take 1 tablet (1 mg total) by mouth once daily.  Swallow whole with a drink of water. 1/30/24  Yes Linda Moscoso PA-C   multivitamin tablet Take 1 tablet by mouth once daily. 4/7/10  Yes Historical Provider, MD   omega-3/dha/epa/fish oil (OMEGA-3 ORAL) Take by mouth.   Yes Historical Provider, MD   SUMAtriptan (Imitrex) 25 mg tablet Take by mouth twice a day. 8/22/19  Yes Historical Provider, MD   ondansetron (Zofran) 4 mg tablet Take 2 tablets (8 mg) by mouth every 8 hours if needed for nausea or vomiting for up to 7 days.  Patient not taking: Reported  "on 4/1/2024 3/18/24 3/25/24  Malika Ramirez MD     No Known Allergies  Social History     Tobacco Use   • Smoking status: Never   • Smokeless tobacco: Never   Substance Use Topics   • Alcohol use: Yes     Comment: 0 - 2 drinks/week.  Usually 0.         Chemistry    Lab Results   Component Value Date/Time     08/22/2023 1227    K 5.1 08/22/2023 1227     08/22/2023 1227    CO2 27 08/22/2023 1227    BUN 9 08/22/2023 1227    CREATININE 0.85 08/22/2023 1227    Lab Results   Component Value Date/Time    CALCIUM 9.8 08/22/2023 1227    ALKPHOS 39 07/20/2023 0841    AST 17 07/20/2023 0841    ALT 16 07/20/2023 0841    BILITOT 0.9 07/20/2023 0841          Lab Results   Component Value Date/Time    WBC 4.2 (L) 03/16/2024 0846    HGB 13.7 03/16/2024 0846    HCT 40.6 03/16/2024 0846     (L) 03/16/2024 0846     No results found for: \"PROTIME\", \"PTT\", \"INR\"  No results found for this or any previous visit (from the past 4464 hour(s)).  No results found for this or any previous visit from the past 1095 days.        Relevant Problems   Neuro   (+) Anxiety disorder       Clinical information reviewed:    Allergies  Meds               NPO Detail:  No data recorded     Physical Exam    Airway  Mallampati: I  TM distance: >3 FB  Neck ROM: full     Cardiovascular   Rhythm: regular  Rate: normal     Dental - normal exam     Pulmonary - normal exam     Abdominal        Anesthesia Plan    History of general anesthesia?: yes  History of complications of general anesthesia?: no    ASA 2     general   (Hx of PONV.  Preop Emend and Scopolamine patch ordered. )  The patient is not a current smoker.    intravenous induction   Postoperative administration of opioids is intended.  Trial extubation is planned.  Anesthetic plan and risks discussed with patient.    Plan discussed with resident.    "

## 2024-04-01 NOTE — OP NOTE
Total Laparoscopic Hysterectomy, Vvzzsllgd-Oykgbots-xmuxgbpeisnl Operative Note     Date: 2024  OR Location: [facility] OR    Name: Meryl Loaiza, : 1975, Age: 49 y.o., MRN: [id number], Sex: female    Diagnosis  Pre-op Diagnosis     * Pelvic mass [R19.00] Post-op Diagnosis     * Pelvic mass [R19.00]     Procedures  Total Laparoscopic Hysterectomy, Nxxrrjtsh-Elcrbceb-prgsepbatsun  85359 - HI LAPAROSCOPY W TOTAL HYSTERECTOMY UTERUS 250 GM/<    HI SALPINGO-OOPHORECTOMY COMPL/PRTL UNI/BI SPX [47949]  Surgeons      * Esperanza Osborn - Primary    Resident/Fellow/Other Assistant:  Surgeon(s) and Role:     * Malika Ramirez MD - Assisting     * Mary Davila MD - Resident - Assisting    Procedure Summary  Anesthesia: General  ASA: II  Anesthesia Staff: Anesthesiologist: Ling Hunt MD  Anesthesia Resident: Grupo Fleming MD  Estimated Blood Loss: 25mL  Intra-op Medications:   Administrations occurring from 1025 to 1500 on 24:   Medication Name Total Dose   sodium chloride 0.9 % irrigation solution 1,500 mL   aprepitant (Emend) capsule 40 mg 40 mg   scopolamine (Transderm-Scop) patch 1 patch 1 patch   celecoxib (CeleBREX) capsule 400 mg 400 mg   gabapentin (Neurontin) tablet 600 mg 600 mg   heparin (porcine) injection 5,000 Units 5,000 Units              Anesthesia Record               Intraprocedure I/O Totals          Intake    Propofol Drip 0.00 mL    The total shown is the total volume documented since Anesthesia Start was filed.    Total Intake 0 mL       Output    Urine 250 mL    Total Blood Loss - Surgical Delivery (mL) 25 mL    Total Output 275 mL       Net    Net Volume -275 mL       Other (could not be determined as input or output)    Surgical Delivery Estimated Blood Loss (mL) 25          Specimen:   ID Type Source Tests Collected by Time   1 : PELVIC WASHINGS Non-Gynecologic Cytology PELVIC WASHING CYTOLOGY CONSULTATION (NON-GYNECOLOGIC) Esperanza Osborn MD 2024 1304   2 :  UTERUS, CERVIX, BILATERAL FALLOPIAN TUBES AND OVARIES Tissue UTERUS, CERVIX, FALLOPIAN TUBES AND OVARIES BILATERAL SURGICAL PATHOLOGY EXAM Esperanza Osborn MD 4/1/2024 1997        Staff:   Circulator: Keyonna Diego RN  Relief Circulator: Liat Lombardo RN  Scrub Person: Shoaib Donna; Brigette Goetz RN         Drains and/or Catheters:   [REMOVED] Urethral Catheter Non-latex 16 Fr. (Removed)       Findings: Normal appearing uterus, cervix; bilateral fallopian tubes and ovaries without grossly visible pathology. Smooth uterine serosa and pelvic peritoneal structures. Pelvic congestion on the left involving the uterine vessels and the IP ligament.  Grossly normal small and large bowel mesenteric and peritoneal surfaces, omentum. Smooth liver contour without visibly evident upper abdominal pathology.       Indications: Meryl Loaiza is an 49 y.o. female who is having surgery for Pelvic mass [R19.00].     The patient was seen in the preoperative area. The risks, benefits, complications, treatment options, non-operative alternatives, expected recovery and outcomes were discussed with the patient. The possibilities of reaction to medication, pulmonary aspiration, injury to surrounding structures, bleeding, recurrent infection, the need for additional procedures, failure to diagnose a condition, and creating a complication requiring transfusion or operation were discussed with the patient. The patient concurred with the proposed plan, giving informed consent.  The site of surgery was properly noted/marked if necessary per policy. The patient has been actively warmed in preoperative area. Preoperative antibiotics have been ordered and given within 1 hours of incision. Venous thrombosis prophylaxis have been ordered including bilateral sequential compression devices and chemical prophylaxis    Procedure Details:   After informed consent was confirmed, the patient was taken to the operating room with an IV in  place. A preoperative huddle and timeout were performed, with all OR personnel confirming correct patient and procedure. The patient was moved to the operating room table and SCDs were placed.  Heparin was administered.  General anesthesia was induced without difficulty. She was then placed in the dorsal lithotomy position on the operating room table using Mike stirrups. She was prepped and draped in a normal sterile fashion for a laparoscopic hysterectomy. A surgical pause was performed. The patient's identity and surgical procedure were again confirmed by all the surgical personnel. The patient received preoperative antibiotics.    A hernández catheter was placed.  A Speedment system was then placed as a uterine manipulator.      We then turned our attention to the laparoscopic portion of the operation. A 12 mm incision inferior to the umbilicus was made. This was carried down to the level of the fascia with two S retractors. The fascia was elevated with 2 kocher clamps and incised with a scalpel.  Both sides of the fascia were then tagged with 0-vicryl suture.  The peritoneum was then elevated with 2 hemostats and was entered sharply with metzenbaum scissors.  The elkin port was then placed. CO2 was then insufflated into the abdomen.     Once this was accomplished, we then carefully inspected the abdomen and there was no evidence of injury. The patient was placed in deep Trendelenburg. We then placed three 5-mm accessory ports.  All were placed under direct visualization.  The small bowel was manipulated out of the pelvis. Washings were collected.     The uterus was placed on traction. The round ligaments were sealed and divided with the ligasure device. The broad ligament was dissected cephalad and caudad, and a bladder flap was created. This was carried to the opposite side, where the round ligament was also divided. The bladder was dissected away from the cervix. The infundibulopelvic ligaments were then isolated  away from the ureters which were well visualized bilaterally.  A window was created between the two and the IP ligaments were cauterized and then divided with the ligasure device.      Adhesions were noted between the sigmoid colon and pelvic sidewall on the patient's left.  These were taken down with the Bovie.  The anterior and posterior leaves of the broad ligament were bluntly .   At the vesicouterine fold the peritoneum was incised transversely and the bladder sharply dissected off the lower uterine segment and upper vagina.  The posterior peritoneum was gently brought down from the uterus.  The uterine vessels were skeletonized then cauterized and divided with the ligasure device followed by the cardinal ligaments and the uterosacral ligaments in a similar fashion.      We then began the colpotomy. Monopolar bovie was used and the cervix was released circumferentially over the cervical cup.  The uterus, tubes and ovaries and cervix were delivered transvaginally without difficulty. The ovaries appeared normal bilaterally and so frozen was not requested.     The colpotomy was closed laparoscopically with a #0 V-loc.  Hemostasis was achieved.  The abdomen was copiously irrigated. Hemostasis was noted.     The 5mm ports were all removed under direct visualization.  The 12mm port was removed and the fascia was then closed with another figure-of-eight suture of 0-vicryl followed by tying the two previously-tagged end of fascia together.  The port sites were all irrigated.  Hemostasis was noted.  Ports were closed using 3-0 monocryl in a subcuticular fashion, followed by steristrips.      The patient was awoken from general anesthesia without difficulty.    Sponge, needle, instrument counts were correct x 2.      Complications:  None; patient tolerated the procedure well.    Disposition: PACU - hemodynamically stable.  Condition: stable     Attending Attestation: I was present during all critical and key  portions of the procedure(s) and immediately available to furnish services the entire duration.  See resident note for details.     MD Esperanza Romeo  Phone Number: [phone]

## 2024-04-01 NOTE — CONSULTS
Consults  Acute Pain Service    Meryl Loaiza is a 49 y.o. year old female patient who presents for  laparoscopic hysterectomy with Dr. Osborn. Acute Pain consulted for block for postoperative pain control.     Anticipated Postop Pain Issues -   Palliative: typically relieved with IV analgesics and regional local anesthetics  Provocative: typically with movement  Quality: typically burning and aching  Radiation: typically none  Severity: typically severe 8-10/10  Timing: typically constant    Past Medical History:   Diagnosis Date    Achilles tendinitis, left leg     Achilles tendinitis of left lower extremity    Anxiety     Migraine, unspecified, not intractable, without status migrainosus 08/22/2019    Migraine    Nevus 06/12/2023    Thrombocytopenia (CMS/HCC) 06/12/2023    Weight gain 06/12/2023        Past Surgical History:   Procedure Laterality Date    BREAST SURGERY  12/1993    OTHER SURGICAL HISTORY  08/22/2019    Breast reduction    WISDOM TOOTH EXTRACTION  2008?      Family History   Problem Relation Name Age of Onset    Stroke Paternal Grandfather Osvaldo Dale       Social History     Socioeconomic History    Marital status:      Spouse name: Not on file    Number of children: Not on file    Years of education: Not on file    Highest education level: Not on file   Occupational History    Not on file   Tobacco Use    Smoking status: Never    Smokeless tobacco: Never   Substance and Sexual Activity    Alcohol use: Yes     Comment: 0 - 2 drinks/week.  Usually 0.    Drug use: Never    Sexual activity: Yes     Partners: Male     Birth control/protection: I.U.D.     Comment:  25 years!   Other Topics Concern    Not on file   Social History Narrative    Not on file     Social Determinants of Health     Financial Resource Strain: Not on file   Food Insecurity: Not on file   Transportation Needs: Not on file   Physical Activity: Not on file   Stress: Not on file   Social Connections: Not  on file   Intimate Partner Violence: Not on file   Housing Stability: Not on file      No Known Allergies      Review of Systems  Gen: No fatigue, anorexia, insomnia, fever.   Eyes: No vision loss, double vision, drainage, eye pain.   ENT: No pharyngitis, dry mouth, no hearing changes or ear discharge  Cardiac: No chest pain, palpitations, syncope, near syncope.   Pulmonary: No shortness of breath, cough, hemoptysis.   Heme/lymph: No swollen glands, fever, bleeding.   GI: No abdominal pain, change in bowel habits, melena, hematemesis, hematochezia, nausea, vomiting, diarrhea.   : No discharge, dysuria, frequency, urgency, hematuria.  Endo: No polyuria or weight loss.   Musculoskeletal: Negative for any pain or loss of ROM/weakness  Skin: No rashes or lesions  Neuro: Normal speech, no numbness or weakness. No gait difficulties  Review of systems is otherwise negative unless stated above or in history of present illness.    Physical Exam:  Constitutional:  no distress, alert and cooperative  Eyes: clear sclera  Head/Neck: No apparent injury, trachea midline  Respiratory/Thorax: Patent airways, thorax symmetric, breathing comfortably  Cardiovascular: no pitting edema  Gastrointestinal: Nondistended  Musculoskeletal: ROM intact  Extremities: no clubbing  Neurological: alert, cage x4  Psychological: Appropriate affect    No results found for this or any previous visit (from the past 24 hour(s)).     Meryl Loaiza is a 49 y.o. year old female patient who presents for  laparoscopic hysterectomy with Dr. Osborn. Acute Pain consulted for block for postoperative pain control.     Plan:  - Bilateral quadratus lumborum blocks performed preoperatively on 04/01/24  - Pain medications per primary team  - Will see on POD1 if inpatient    Acute Pain Team  pg [phone] ph [phone].

## 2024-04-03 ENCOUNTER — TELEPHONE (OUTPATIENT)
Dept: GYNECOLOGIC ONCOLOGY | Facility: HOSPITAL | Age: 49
End: 2024-04-03
Payer: COMMERCIAL

## 2024-04-03 LAB
LABORATORY COMMENT REPORT: NORMAL
LABORATORY COMMENT REPORT: NORMAL
PATH REPORT.FINAL DX SPEC: NORMAL
PATH REPORT.GROSS SPEC: NORMAL
PATH REPORT.RELEVANT HX SPEC: NORMAL
PATH REPORT.TOTAL CANCER: NORMAL

## 2024-04-03 NOTE — TELEPHONE ENCOUNTER
Phoned patient in response to [employer] message received regarding gas pain/discomfort s/p BSO on 4/1/24.    Patient states she had BM since sending message and feeling better.  Advised patient to continue to monitor symptoms and if she is unable to pass flatus, move bowels or develops n/v to call office back.

## 2024-04-05 LAB
LABORATORY COMMENT REPORT: NORMAL
PATH REPORT.FINAL DX SPEC: NORMAL
PATH REPORT.GROSS SPEC: NORMAL
PATH REPORT.RELEVANT HX SPEC: NORMAL
PATH REPORT.TOTAL CANCER: NORMAL

## 2024-04-12 ENCOUNTER — TUMOR BOARD CONFERENCE (OUTPATIENT)
Dept: HEMATOLOGY/ONCOLOGY | Facility: HOSPITAL | Age: 49
End: 2024-04-12
Payer: COMMERCIAL

## 2024-04-13 ENCOUNTER — HOSPITAL ENCOUNTER (EMERGENCY)
Facility: HOSPITAL | Age: 49
Discharge: HOME | End: 2024-04-13
Attending: EMERGENCY MEDICINE
Payer: MEDICARE

## 2024-04-13 ENCOUNTER — APPOINTMENT (OUTPATIENT)
Facility: HOSPITAL | Age: 49
End: 2024-04-13
Payer: MEDICARE

## 2024-04-13 VITALS
SYSTOLIC BLOOD PRESSURE: 160 MMHG | HEART RATE: 100 BPM | RESPIRATION RATE: 19 BRPM | TEMPERATURE: 98.1 F | DIASTOLIC BLOOD PRESSURE: 96 MMHG | OXYGEN SATURATION: 98 %

## 2024-04-13 DIAGNOSIS — S83.412A SPRAIN OF MEDIAL COLLATERAL LIGAMENT OF LEFT KNEE, INITIAL ENCOUNTER: Primary | ICD-10-CM

## 2024-04-13 LAB — ECHO BSA: 1.71 M2

## 2024-04-13 PROCEDURE — 73562 X-RAY EXAM OF KNEE 3: CPT

## 2024-04-13 PROCEDURE — 93244 EXT ECG>48HR<7D REV&INTERPJ: CPT | Performed by: INTERNAL MEDICINE

## 2024-04-13 PROCEDURE — 99283 EMERGENCY DEPT VISIT LOW MDM: CPT

## 2024-04-13 ASSESSMENT — PAIN - FUNCTIONAL ASSESSMENT
PAIN_FUNCTIONAL_ASSESSMENT: PREVENTS OR INTERFERES SOME ACTIVE ACTIVITIES AND ADLS
PAIN_FUNCTIONAL_ASSESSMENT: 0-10

## 2024-04-13 ASSESSMENT — PAIN SCALES - GENERAL: PAINLEVEL_OUTOF10: 6

## 2024-04-13 ASSESSMENT — PAIN DESCRIPTION - LOCATION: LOCATION: KNEE

## 2024-04-13 ASSESSMENT — PAIN DESCRIPTION - PAIN TYPE: TYPE: ACUTE PAIN

## 2024-04-13 ASSESSMENT — LIFESTYLE VARIABLES
HOW MANY STANDARD DRINKS CONTAINING ALCOHOL DO YOU HAVE ON A TYPICAL DAY: PATIENT DOES NOT DRINK
HOW OFTEN DO YOU HAVE A DRINK CONTAINING ALCOHOL: NEVER

## 2024-04-13 NOTE — ED TRIAGE NOTES
C/o L knee/ shin pain that started Wednesday after running. Patient states the pain feels like a \"constant burning sensation\". Patient is able to bear weight.   Tylenol not effective at relieving pain.

## 2024-04-13 NOTE — ED PROVIDER NOTES
[facility] EMERGENCY DEP  EMERGENCY DEPARTMENT ENCOUNTER       Pt Name: Pat Fletcher  MRN: [id number]  Birthdate 1975  Date of evaluation: 4/13/2024  Provider: Russell Joyce MD   PCP: Samuel Gooden MD  Note Started: 9:29 AM 4/13/24     CHIEF COMPLAINT       Chief Complaint   Patient presents with    Knee Pain        HISTORY OF PRESENT ILLNESS: 1 or more elements      History From: Patient, History limited by: none     Pat Fletcher is a pleasant 49 y.o. female who presents to the emergency department with knee pain.  Patient states that her left knee began hurting after going for a run 4 days ago.  Now has severe pain in her her medial collateral insertion on the tibia area, there is no erythema, swelling or inflammation, but area is hypersensitive to touch.  Patient is having difficulty with weightbearing due to the discomfort in her knee.  She was unable to see her doctor to get an x-ray, came to the emergency department for evaluation.     Nursing Notes were all reviewed and agreed with or any disagreements were addressed in the HPI.     REVIEW OF SYSTEMS        Positives and Pertinent negatives as per HPI.    PAST HISTORY     Past Medical History:  Past Medical History:   Diagnosis Date    Anxiety     Depression     Diabetes (HCC)     Hypercholesterolemia     Hypothyroid     Ill-defined condition     pancreatitis 2015    Liver disease     \"liver failure\" 2015    Long term (current) use of anticoagulants     SBO (small bowel obstruction) (HCC)     Thromboembolus (HCC)        Past Surgical History:  Past Surgical History:   Procedure Laterality Date    CHOLECYSTECTOMY      HYSTERECTOMY (CERVIX STATUS UNKNOWN)      SD UNLISTED PROCEDURE ABDOMEN PERITONEUM & OMENTUM      Pt stated \"I have no pancreas\"     SPLENECTOMY         Family History:  Family History   Problem Relation Age of Onset    Cancer Maternal Grandmother     Cancer Maternal Aunt     Cancer Mother     Cancer Other     Hypertension Other   Source Oral      SpO2 98 %      Weight       Height       Head Circumference       Peak Flow       Pain Score       Pain Loc       Pain Edu?       Excl. in GC?         Physical Exam  Constitutional:       Appearance: Normal appearance.   HENT:      Head: Normocephalic.      Right Ear: External ear normal.      Left Ear: External ear normal.   Eyes:      Extraocular Movements: Extraocular movements intact.      Conjunctiva/sclera: Conjunctivae normal.   Pulmonary:      Effort: Pulmonary effort is normal.   Musculoskeletal:      Cervical back: Normal range of motion and neck supple.      Comments: Patient has limited motion of the left knee, tenderness in the medial collateral ligament distally at the tibial attachment, there is no swelling, no erythema, no inflammation at this point, but the skin is hypersensitive to touch here.  Patient's ligaments are stable to testing, stretching medial collateral does not exacerbate symptoms.   Skin:     General: Skin is warm and dry.   Neurological:      General: No focal deficit present.      Mental Status: She is alert. Mental status is at baseline.   Psychiatric:         Mood and Affect: Mood normal.        DIAGNOSTIC RESULTS     RADIOLOGY:  Non-plain film images such as CT, Ultrasound and MRI are read by the radiologist. Plain radiographic images are visualized and preliminarily interpreted by the ED Provider with the findings documented in the MDM section.     Interpretation per the Radiologist below, if available at the time of this note:     XR KNEE LEFT (3 VIEWS)   Final Result   No acute abnormality.            PROCEDURES   Unless otherwise noted below, none  Procedures       CRITICAL CARE TIME   0 Min    EMERGENCY DEPARTMENT COURSE and DIFFERENTIAL DIAGNOSIS/MDM   Vitals:    Vitals:    04/13/24 0832   BP: (!) 160/96   Pulse: 100   Resp: 19   Temp: 98.1 °F (36.7 °C)   TempSrc: Oral   SpO2: 98%        Patient was given the following medications:  Medications - No data to

## 2024-04-17 ENCOUNTER — TELEPHONE (OUTPATIENT)
Age: 49
End: 2024-04-17

## 2024-04-17 NOTE — TELEPHONE ENCOUNTER
Patient informed of results by mychart message     Future Appointments   Date Time Provider Department Center   4/23/2024  2:00 PM [facility]

## 2024-04-24 NOTE — PROGRESS NOTES
Patient ID: Meryl Loaiza is a 49 y.o. female.  Referring Physician: No referring provider defined for this encounter.  Primary Care Provider: Oswaldo Dunaway MD      Subjective    Here for POV s/p TLH BSO for pelvic mass on 24. Pain is well controlled with PO medication. Tolerating PO without nausea/emesis, voiding without difficulty. Denies hot flashes. Pulled a muscle helping carry groceries for her mom yesterday.    PMH:  Breast cancer  Anxiety     PSH:  Mastectomy L  Breast reduction   D&C  TLH BSO    OBHx:  The patient is a  s/p . She underwent menopause unknown (had an IUD). She did not use HRT.    Social:  The patient lives at home with angel, . The patient works at the [employer]    FamHx:  Paternal grandfather diagnosed w/ colon cancer age 60  Mom thyroid cancer     Screening:  Cervical cancer: UTD normal  Mammogram:  23 BIRADS-4   Colonoscopy: cologuard 6/15/22 negative      Review of Systems   All other systems reviewed and are negative.       Objective   BSA: 1.68 meters squared  /76   Pulse 82   Temp 36.4 °C (97.5 °F)   Resp 18   Wt 61.8 kg (136 lb 3.2 oz)   SpO2 98%   BMI 22.66 kg/m²      Family History   Problem Relation Name Age of Onset    Stroke Paternal Grandfather Osvaldo Loaiza  reports that she has never smoked. She has never used smokeless tobacco.  She  reports current alcohol use.  She  reports no history of drug use.    Physical Exam  Vitals and nursing note reviewed. Exam conducted with a chaperone present.   Constitutional:       General: She is not in acute distress.     Appearance: Normal appearance.   HENT:      Head: Normocephalic and atraumatic.      Mouth/Throat:      Mouth: Mucous membranes are moist.      Pharynx: No oropharyngeal exudate or posterior oropharyngeal erythema.   Eyes:      Extraocular Movements: Extraocular movements intact.      Conjunctiva/sclera: Conjunctivae normal.      Pupils: Pupils are  equal, round, and reactive to light.   Neck:      Thyroid: No thyroid mass or thyromegaly.   Cardiovascular:      Rate and Rhythm: Normal rate and regular rhythm.      Pulses: Normal pulses.      Heart sounds: Normal heart sounds.   Pulmonary:      Effort: Pulmonary effort is normal.      Breath sounds: Normal breath sounds. No wheezing, rhonchi or rales.   Abdominal:      General: Bowel sounds are normal. There is no distension.      Palpations: Abdomen is soft. There is no mass.      Tenderness: There is no abdominal tenderness.      Hernia: No hernia is present.   Genitourinary:     Comments: Normal external female genitalia without lesions or masses  Speculum exam: Smooth vagina without lesions or masses  Bimanual exam: smooth vagina without lesions or masses, surgically absent uterus, cervix, adnexa      Musculoskeletal:         General: No tenderness. Normal range of motion.      Cervical back: Normal range of motion and neck supple. No tenderness.      Right lower leg: No edema.      Left lower leg: No edema.   Skin:     General: Skin is warm.      Findings: No lesion or rash.   Neurological:      General: No focal deficit present.      Mental Status: She is alert and oriented to person, place, and time.   Psychiatric:         Mood and Affect: Mood normal.         Behavior: Behavior normal.         Performance Status:  Asymptomatic    Assessment/Plan    50 y/o F here for POV s/p TLH BSO for benign pelvic mass  ECOG - 0   Comorbidities - anxiety, breast cancer    Diagnoses and all orders for this visit:  # Post op  - Recovering well  - Reviewed ongoing restrictions (no lifting more than 10-15lb, nothing per vagina, no soaking)  - Pathology reviewed, benign  - Vaginal cuff still healing.  Patient is aware that the vaginal cuff is the last site to heal.  Continue pelvic rest.  - Abdominal incision healing well.     RTC PRN    Seen w/ Dr Tori Ramirez MD MPH   Gynecologic Oncology PGY7  Pager: [phone],  Team Phone: [phone]

## 2024-04-26 ENCOUNTER — OFFICE VISIT (OUTPATIENT)
Dept: GYNECOLOGIC ONCOLOGY | Facility: HOSPITAL | Age: 49
End: 2024-04-26
Payer: COMMERCIAL

## 2024-04-26 VITALS
RESPIRATION RATE: 18 BRPM | WEIGHT: 136.2 LBS | BODY MASS INDEX: 22.66 KG/M2 | DIASTOLIC BLOOD PRESSURE: 76 MMHG | TEMPERATURE: 97.5 F | HEART RATE: 82 BPM | OXYGEN SATURATION: 98 % | SYSTOLIC BLOOD PRESSURE: 113 MMHG

## 2024-04-26 DIAGNOSIS — R19.00 PELVIC MASS: ICD-10-CM

## 2024-04-26 DIAGNOSIS — Z98.890 POST-OPERATIVE STATE: Primary | ICD-10-CM

## 2024-04-26 ASSESSMENT — PAIN SCALES - GENERAL: PAINLEVEL: 3

## 2024-05-01 ENCOUNTER — OFFICE VISIT (OUTPATIENT)
Dept: HEMATOLOGY/ONCOLOGY | Facility: CLINIC | Age: 49
End: 2024-05-01
Payer: COMMERCIAL

## 2024-05-01 VITALS
DIASTOLIC BLOOD PRESSURE: 72 MMHG | RESPIRATION RATE: 18 BRPM | WEIGHT: 137.68 LBS | TEMPERATURE: 97 F | OXYGEN SATURATION: 98 % | HEART RATE: 72 BPM | BODY MASS INDEX: 22.91 KG/M2 | SYSTOLIC BLOOD PRESSURE: 114 MMHG

## 2024-05-01 DIAGNOSIS — D05.12 DUCTAL CARCINOMA IN SITU (DCIS) OF LEFT BREAST: ICD-10-CM

## 2024-05-01 PROCEDURE — 99214 OFFICE O/P EST MOD 30 MIN: CPT | Performed by: STUDENT IN AN ORGANIZED HEALTH CARE EDUCATION/TRAINING PROGRAM

## 2024-05-01 RX ORDER — ANASTROZOLE 1 MG/1
1 TABLET ORAL DAILY
Qty: 90 TABLET | Refills: 3 | Status: SHIPPED | OUTPATIENT
Start: 2024-05-01

## 2024-05-01 ASSESSMENT — PAIN SCALES - GENERAL: PAINLEVEL: 0-NO PAIN

## 2024-05-01 NOTE — PROGRESS NOTES
Patient ID: Meryl Loaiza is a 49 y.o. female.    The patient presents to clinic today for her history of breast cancer.     Cancer Staging   No matching staging information was found for the patient.        Diagnostic/Therapeutic History:  Treatment Synopsis:    - On 6/20/2023 she had screening mammogram that showed calcifications in the left breast at 9:00 at middle depth.  No suspicious masses or calcification of the right  breast     -On 7/14/2023 she had diagnostic mammogram that showed extremely dense breast tissue with pleomorphic calcification in the central medial left breast at middle depth.  There are group of calcifications measuring 1.8 cm l     -Left breast calcifications stereotactic guided core needle biopsy showed ductal carcinoma in situ, high nuclear grade, solid and cribriform patterns with associated necrosis.  ER +95% grade 3     -On 9/1/2023:  Dr. Martínez performed left partial mastectomy that showed DCIS grade 3,  measuring 0.6cm with path stage: pTisNxMx     -Completed radiation therapy on 10/16/2023        History of Present Illness (HPI)/Interval History:    On 04/01/2024: TLH BSO for benign pelvic mass     Doing well, recovering well from surgery. Acupuncture  helped with hot flushes previously - has acupuncture scheduled.    Doing well    She denies any fevers or chills.    She denies any chest pain or breathing issues.     She denies any vision changes or headache issues, dizziness, loss of balance, neuropathy and no falls.     She denies any new or unexplained bone aches or pains.    She denies any skin lesions or masses, hair loss, nail changes, or oral sores.         14 pts review of system otherwise unremarkable      PMH: none     PSH: breast reduction in the 90s     Allergies/meds reviewed     Reproductive hx: Menarche 11, AFLB: 27, menopause yes, HRT: no, has Mirena IUD in place since 7 years        Review of Systems:  14-point ROS otherwise negative, as per HPI.    Past  Medical History:   Diagnosis Date    Achilles tendinitis, left leg     Achilles tendinitis of left lower extremity    Anxiety     Migraine, unspecified, not intractable, without status migrainosus 08/22/2019    Migraine    Nevus 06/12/2023    Thrombocytopenia (CMS-HCC) 06/12/2023    Weight gain 06/12/2023       Past Surgical History:   Procedure Laterality Date    BREAST SURGERY  12/1993    OTHER SURGICAL HISTORY  08/22/2019    Breast reduction    WISDOM TOOTH EXTRACTION  2008?       Social History     Socioeconomic History    Marital status:      Spouse name: Not on file    Number of children: Not on file    Years of education: Not on file    Highest education level: Not on file   Occupational History    Not on file   Tobacco Use    Smoking status: Never    Smokeless tobacco: Never   Substance and Sexual Activity    Alcohol use: Yes     Comment: 0 - 2 drinks/week.  Usually 0.    Drug use: Never    Sexual activity: Yes     Partners: Male     Birth control/protection: I.U.D.     Comment:  25 years!   Other Topics Concern    Not on file   Social History Narrative    Not on file     Social Determinants of Health     Financial Resource Strain: Not on file   Food Insecurity: Not on file   Transportation Needs: Not on file   Physical Activity: Not on file   Stress: Not on file   Social Connections: Not on file   Intimate Partner Violence: Not on file   Housing Stability: Not on file       No Known Allergies      Current Outpatient Medications:     anastrozole (Arimidex) 1 mg tablet, Take 1 tablet (1 mg total) by mouth once daily.  Swallow whole with a drink of water., Disp: 90 tablet, Rfl: 3    multivitamin tablet, Take 1 tablet by mouth once daily., Disp: , Rfl:     omega-3/dha/epa/fish oil (OMEGA-3 ORAL), Take by mouth., Disp: , Rfl:     SUMAtriptan (Imitrex) 25 mg tablet, Take by mouth twice a day., Disp: , Rfl:      Objective    BSA: 1.69 meters squared  /72 (BP Location: Right arm, Patient  Position: Sitting, BP Cuff Size: Adult)   Pulse 72   Temp 36.1 °C (97 °F) (Temporal)   Resp 18   Wt 62.5 kg (137 lb 10.8 oz)   SpO2 98%   BMI 22.91 kg/m²     ECOG Performance Status: 0    Physical Exam    Constitutional: Well developed, awake/alert/oriented  x3, no distress, alert and cooperative   Eyes: PERRL, EOMI, clear sclera   ENMT: mucous membranes moist, no apparent injury,  no lesions seen   Head/Neck: Neck supple, no apparent injury, thyroid  without mass or tenderness, No JVD, trachea midline, no bruits   Respiratory/Thorax: Patent airways, CTAB, normal  breath sounds with good chest expansion, thorax symmetric   Cardiovascular: Regular, rate and rhythm, no murmurs,  2+ equal pulses of the extremities, normal S 1and S 2   Extremities: normal extremities, no cyanosis edema,  contusions or wounds, no clubbing   Breast: deferred this visit       Laboratory Data:  Lab Results   Component Value Date    WBC 4.2 (L) 03/16/2024    HGB 13.7 03/16/2024    HCT 40.6 03/16/2024    MCV 89 03/16/2024     (L) 03/16/2024       Chemistry    Lab Results   Component Value Date/Time     08/22/2023 1227    K 5.1 08/22/2023 1227     08/22/2023 1227    CO2 27 08/22/2023 1227    BUN 9 08/22/2023 1227    CREATININE 0.85 08/22/2023 1227    Lab Results   Component Value Date/Time    CALCIUM 9.8 08/22/2023 1227    ALKPHOS 39 07/20/2023 0841    AST 17 07/20/2023 0841    ALT 16 07/20/2023 0841    BILITOT 0.9 07/20/2023 0841             Radiology:  US PELVIS TRANSABDOMINAL WITH TRANSVAGINAL  Narrative: Interpreted By:  Ramin Rutledge,   STUDY:  US PELVIS TRANSABDOMINAL WITH TRANSVAGINAL  2/19/2024 11:37 am      INDICATION:  48 y/o   F with  Signs/Symptoms:abnormal uterine bleeding      COMPARISON:  None.      ACCESSION NUMBER(S):  [id number]      ORDERING CLINICIAN:  ALISE BULLOCK      TECHNIQUE:  Routine ultrasound of the pelvis was performed with duplex Doppler  (color and spectral) evaluation.  Evaluation of  the female pelvis was  performed by transabdominal technique .  Static images were obtained  for remote interpretation.      FINDINGS:  UTERUS:  Size:  8.3 x 3.6 x 4.5 cm  Masses: There is a 1.1 x 1.0 x 0.9 cm fibroid.  Endometrial thickness: 3 mm.      CERVIX:  Normal.      RIGHT OVARY:  Right ovary size: 2.7 x 1.2 x 2.7 cm      No dominant cyst or ovarian mass. Color/arterial flow is seen.      LEFT OVARY:  Left ovary size: 3.6 x 2.1 x 2.2 cm      There is a small solid appearing lesion measuring up to 1.3 x 1.2 cm  which demonstrates smooth contour and color score 0. Given lack of  any explained ascites, this is most compatible with an O rads 3  lesion. Superimposed cyst of the left ovary measuring up to 1.3 cm  which appears to contain subtle heterogeneous material compatible  with a likely papillary projection. This is compatible with an O rads  4 lesion.      No adnexal mass seen.      FREE FLUID:   None.      Impression: Left ovary contains 2 separate lesions, 1 which is solid and is  compatible with an over rads 3 lesion and another which is cystic  containing solid components compatible with an O rads 4 lesion.  Further gynecologic oncology assessment and workup advised.      1.1 x 1.0 x 0.9 cm fibroid.      MACRO:  None      Signed by: Ramin Rutledge 2/20/2024 4:15 PM  Dictation workstation:   [id number]       BI mammo left diagnostic 07/14/2023    Narrative  Interpreted By:  EMIL CASTELLANO MD  MRN: [id number]  Patient Name: STEFANIE EDMONDS    STUDY:  DIGITAL DIAG MAMMO LEFT UNILAT;  7/14/2023 3:30 pm    ACCESSION NUMBER(S):  [id number]    ORDERING CLINICIAN:  BAM HILL    INDICATION:  Patient recalled from screening mammography dated 06/20/2023 for  indeterminate left breast calcifications.    COMPARISON:  06/20/2023, 02/26/2020, 11/14/2018    FINDINGS:    The breast tissue is extremely dense, which may limit the sensitivity  of mammography. Orthogonal spot magnification views were performed.  In the  central medial left breast at middle depth there are  pleomorphic grouped calcifications. The group of calcifications  measures 1.8 cm. A stereotactic biopsy is recommended.    Impression  Indeterminate left breast calcifications. A stereotactic biopsy is  recommended. This was discussed with the patient at the time of her  visit with Dr. Bloom. A preprocedure form has been completed.  A  message was sent to the referring practioner at the time of this  dictation regarding these critical findings using the [employer] system.    BI-RADS CATEGORY:    Category: 4 - Suspicious.  Recommendation: Biopsy Recommended.    Method of Detection: Category Sdbt - 3D Screening    For any future breast imaging appointments, please call [phone]  [phone]).    Patient letter sent [facility]          Assessment/Plan:  49 year old female previously healthy found to have new ER+ DCIS s/p PM , performed by Dr Martínez coming in to discuss adjuvant treatment      - On 6/20/2023 she had screening mammogram that showed calcifications in the left breast at 9:00 at middle depth.  No suspicious masses or calcification of the right breast     -On 7/14/2023 she had diagnostic mammogram that showed extremely dense breast tissue with pleomorphic calcification in the central medial left breast at middle depth.  There are group of calcifications measuring 1.8 cm l     -Left breast calcifications stereotactic guided core needle biopsy showed ductal carcinoma in situ, high nuclear grade, solid and cribriform patterns with associated necrosis.  ER +95% grade 3     -On 9/1/2023:  Dr. Martínez performed left partial mastectomy that showed DCIS grade 3,  measuring 0.6cm with path stage:   pTisNxMx    - completed radiation therapy on 10/16    - FSH: above 90, patient is post menopausal Prescribed anastrozole 1mg daily sent in to her pharmacy. c/w Ca/vit D. I did order baseline DEXA scan- WNL    - 04/01/2024: [facility] BSO for benign pelvic mass - healing  well.    Mammogram in 06/24/2024    and fup with Dr Martínez  RTC in 6M    At least 35minutes of direct consultation was spent reviewing the patient's chart as well as discussing and  reviewing the  cancer care plan including educating and answering  questions and concerns, greater than 50 percent spent in counseling and coordination  of care.             Dane Elena MD  Hematology and Medical Oncology  [facility]

## 2024-05-07 ENCOUNTER — ALLIED HEALTH (OUTPATIENT)
Dept: INTEGRATIVE MEDICINE | Facility: CLINIC | Age: 49
End: 2024-05-07

## 2024-05-07 DIAGNOSIS — G47.00 INSOMNIA, UNSPECIFIED TYPE: Primary | ICD-10-CM

## 2024-05-07 PROCEDURE — 97139 UNLISTED THERAPEUTIC PX: CPT

## 2024-05-07 NOTE — PROGRESS NOTES
Acupuncture Visit: GROUP ACU    Subjective   Patient ID: Meryl Loaiza is a 49 y.o. female who presents for Insomnia, Hot Flashes, and Foot Pain    GROUP ACU NPV: Insomnia    05/07/24    Insomnia has been manageable.  Currently suffering from hotflashes since having a hysterectomy in April.      Experiencing lateral foot pain on her right foot.  Worried about her upcoming belly dancing performance with foot pain.    01/02/24    iMask, meditating and breathing exercises have all been working with helping to sleep better at night.  She wakes up sometimes but is able to fall back easily.  Anxiety has been manageable as well.    12/12/23    Patient states she feels like sleep has been getting better.  No frequent night sweats lately.  Woke up once around 4am last week with a bit of sweat but hasn't had that happen since.  The frequent waking is at a minimum and is able to return to sleep much faster than before.    12/05/23    Patient is here due to sleep concerns.  She is a referral from Dr. Olea.  She reports the onset of her sleep concerns started after she finished radiation therapy for breast cancer.  She continues to have difficulty staying asleep with frequent waking.  Cancer hx, June 2023 mammogram, September 2023 biopsy.  Prior to radiation therapy, she was a healthy sleeper with a relatively healthy lifestyle but since going through radiation during which she experienced a lot of thirst and dryness requiring frequent hydration well into the night, she has had difficulty staying asleep since.    Anastrozole medication induced headaches; prescribed about a month ago.        Session Information  Is this acupuncture treatment being billed to the patient's insurance company: No  Visit Type: Follow-up visit  Medical History Reviewed: I have reviewed pertinent medical history in EHR, and no contraindications are present to provide treatment         Review of Systems         Provider reviewed plan for the  acupuncture session, precautions and contraindications. Patient/guardian/hospital staff has given consent to treat with full understanding of what to expect during the session. Before acupuncture began, provider explained to the patient to communicate at any time if the procedure was causing discomfort past their tolerance level. Patient agreed to advise acupuncturist. The acupuncturist counseled the patient on the risks of acupuncture treatment including pain, infection, bleeding, and no relief of pain. The patient was positioned comfortably. There was no evidence of infection at the site of needle insertions.    Objective   Physical Exam         Treatment Plan  Treatment Goals: Wellbeing improvement, Pain management    Acupuncture Treatment  Patient Position: Seated and reclining  Acupuncture Needling: Yes  Needle Guage: 36 guage /.20/ Blue seirin  Body Points: With retention  Body Points - Bilateral: LV3; KD2,3,6,7; LU7; ST36; HT6; SP6; GV20  Auricular Points: No  Electroacupuncture Used: No  Needle Count In: 19  Needle Count Out: 19  Needle Retention Time (min): 30 minutes  Total Face to Face Time (min): 25 minutes              Assessment/Plan

## 2024-06-24 ENCOUNTER — OFFICE VISIT (OUTPATIENT)
Dept: SURGICAL ONCOLOGY | Facility: HOSPITAL | Age: 49
End: 2024-06-24
Payer: COMMERCIAL

## 2024-06-24 ENCOUNTER — HOSPITAL ENCOUNTER (OUTPATIENT)
Dept: RADIOLOGY | Facility: HOSPITAL | Age: 49
Discharge: HOME | End: 2024-06-24
Payer: COMMERCIAL

## 2024-06-24 VITALS
HEART RATE: 74 BPM | WEIGHT: 137 LBS | BODY MASS INDEX: 22.02 KG/M2 | HEIGHT: 66 IN | DIASTOLIC BLOOD PRESSURE: 79 MMHG | SYSTOLIC BLOOD PRESSURE: 109 MMHG

## 2024-06-24 VITALS — HEIGHT: 65 IN | BODY MASS INDEX: 22.82 KG/M2 | WEIGHT: 137 LBS

## 2024-06-24 DIAGNOSIS — D05.12 DUCTAL CARCINOMA IN SITU (DCIS) OF LEFT BREAST: ICD-10-CM

## 2024-06-24 DIAGNOSIS — R92.8 ABNORMAL FINDING ON BREAST IMAGING: ICD-10-CM

## 2024-06-24 DIAGNOSIS — D05.12 INTRADUCTAL CARCINOMA IN SITU OF LEFT BREAST: ICD-10-CM

## 2024-06-24 PROCEDURE — 77062 BREAST TOMOSYNTHESIS BI: CPT | Performed by: RADIOLOGY

## 2024-06-24 PROCEDURE — 1036F TOBACCO NON-USER: CPT | Performed by: SURGERY

## 2024-06-24 PROCEDURE — 77062 BREAST TOMOSYNTHESIS BI: CPT

## 2024-06-24 PROCEDURE — 99214 OFFICE O/P EST MOD 30 MIN: CPT | Performed by: SURGERY

## 2024-06-24 PROCEDURE — 77066 DX MAMMO INCL CAD BI: CPT | Performed by: RADIOLOGY

## 2024-06-24 NOTE — PROGRESS NOTES
BREAST SURGICAL ONCOLOGY FOLLOW UP     Assessment/Plan     1. left breast DCIS g3 ER 95% medial central breast spanning 0.6cm on final pathology  -[id number]     Clinical breast exam and mammogram today are normal.  There is no evidence of local recurrence.    Continue follow up with medical oncology as scheduled.    Will follow up in the breast center with me and left mammogram in 6 months.    Discussed genetic testing given age at diagnosis <50.  Will think about this and revisit at our 6 month follow up.    Subjective   Meryl Loaiza is a 49 y.o. female presents today for follow up carcinoma of the left breast.     Treatment history  Surgery: 9/1/2023 left MS PM DCIS g3 0.6cm margins negative. pTisNxMx   Radiation: Completed XRT 10/16/2023.  Systemic therapy: anastrozole    Bilateral mammogram today: BIRADS 3    Had issues with vaginal bleeding.  Had work up and eventual hysterectomy BSO with benign pathology.    Brain fog since surgeries.      Review of Systems   Constitutional symptoms: Denies generalized fatigue.  Denies weight change, fevers/chills, difficulty sleeping   Eyes: Denies double vision, glaucoma, cataracts.  Ear/nose/throat/mouth: Denies hearing changes, sore throat, sinus problems.  Cardiovascular: No chest pain.  Denies irregular heartbeat.  Denies ankle swelling.  Respiratory: No wheezing, cough, or shortness of breath.  Gastrointestinal: No abdominal pain,  No nausea/vomiting.  No indigestion/heartburn.  No change in bowel habits.  No constipation or diarrhea.   Genitourinary: No urinary incontinence.  No urinary frequency.  No painful urination.  Musculoskeletal: No bone pain, no muscle pain, no joint pain.   Integumentary: No rash. No masses.  No changes in moles.  No easy bruising.  Neurological: No headaches.  No tremors. No numbness/tingling.  Psychiatric: No anxiety. No depression.  Endocrine: No excessive thirst.  Not too hot or too cold.  Not tired or fatigued.     Hematological/lymphatic: No swollen glands or blood clotting problems.  No bruising.    Objective   Physical Exam  General: Alert and oriented x 3.  Mood and affect are appropriate.  HEENT: EOMI, PERRLA.   Neck: supple, no masses, no cervical adenopathy.  Cardiovascular: no lower extremity edema.  Pulmonary: breathing non labored on room air.  GI: Abdomen soft, no masses. No hepatomegaly or splenomegaly.  Lymph nodes: No supraclavicular or axillary adenopathy bilaterally.  Musculoskeletal: Full range of motion in the upper extremities bilaterally.  Neuro: denies dizziness, tremors    Breasts: The breasts were examined in both the seated and supine positions.  Well healed bilateral wise pattern incisions.  RIGHT: The nipple is everted without nipple discharge.  There are no skin changes, skin thickening, or dimpling. There are no masses palpated in the RIGHT breast.   LEFT: The nipple is everted without nipple discharge.  There are no skin changes, skin thickening, or dimpling. There are no masses palpated in the LEFT breast.       Radiology review: All images and reports were personally reviewed.         Shelly Martínez, DO

## 2024-08-06 ENCOUNTER — PATIENT MESSAGE (OUTPATIENT)
Dept: HEMATOLOGY/ONCOLOGY | Facility: CLINIC | Age: 49
End: 2024-08-06
Payer: COMMERCIAL

## 2024-08-23 ENCOUNTER — TELEPHONE (OUTPATIENT)
Dept: HEMATOLOGY/ONCOLOGY | Facility: HOSPITAL | Age: 49
End: 2024-08-23
Payer: COMMERCIAL

## 2024-08-23 DIAGNOSIS — D05.12 DUCTAL CARCINOMA IN SITU (DCIS) OF LEFT BREAST: Primary | ICD-10-CM

## 2024-08-23 RX ORDER — EXEMESTANE 25 MG/1
25 TABLET ORAL DAILY
Qty: 90 TABLET | Refills: 2 | Status: SHIPPED | OUTPATIENT
Start: 2024-08-23 | End: 2025-05-20

## 2024-08-29 ENCOUNTER — APPOINTMENT (OUTPATIENT)
Dept: PRIMARY CARE | Facility: CLINIC | Age: 49
End: 2024-08-29
Payer: COMMERCIAL

## 2024-08-29 VITALS
TEMPERATURE: 97.4 F | DIASTOLIC BLOOD PRESSURE: 76 MMHG | HEIGHT: 65 IN | SYSTOLIC BLOOD PRESSURE: 118 MMHG | BODY MASS INDEX: 23.32 KG/M2 | WEIGHT: 140 LBS | RESPIRATION RATE: 16 BRPM | HEART RATE: 81 BPM

## 2024-08-29 DIAGNOSIS — Z90.710 HISTORY OF HYSTERECTOMY WITH BILATERAL OOPHORECTOMY: ICD-10-CM

## 2024-08-29 DIAGNOSIS — D05.12 DUCTAL CARCINOMA IN SITU (DCIS) OF LEFT BREAST: ICD-10-CM

## 2024-08-29 DIAGNOSIS — Z90.722 HISTORY OF HYSTERECTOMY WITH BILATERAL OOPHORECTOMY: ICD-10-CM

## 2024-08-29 DIAGNOSIS — Z00.00 ENCOUNTER FOR PREVENTIVE HEALTH EXAMINATION: Primary | ICD-10-CM

## 2024-08-29 PROBLEM — M76.60 ACHILLES TENDINITIS: Status: ACTIVE | Noted: 2024-08-29

## 2024-08-29 PROBLEM — Z97.5 USES INTRAUTERINE DEVICE FOR BIRTH CONTROL: Status: ACTIVE | Noted: 2023-06-12

## 2024-08-29 PROBLEM — N93.9 ABNORMAL UTERINE BLEEDING: Status: ACTIVE | Noted: 2024-08-29

## 2024-08-29 PROBLEM — M54.50 LOW BACK PAIN: Status: ACTIVE | Noted: 2018-11-06

## 2024-08-29 PROBLEM — R92.8 ABNORMAL FINDING ON BREAST IMAGING: Status: RESOLVED | Noted: 2023-10-03 | Resolved: 2024-08-29

## 2024-08-29 PROBLEM — R82.998 DARK URINE: Status: RESOLVED | Noted: 2023-10-10 | Resolved: 2024-08-29

## 2024-08-29 PROCEDURE — 3008F BODY MASS INDEX DOCD: CPT | Performed by: FAMILY MEDICINE

## 2024-08-29 PROCEDURE — 90750 HZV VACC RECOMBINANT IM: CPT | Performed by: FAMILY MEDICINE

## 2024-08-29 PROCEDURE — 99396 PREV VISIT EST AGE 40-64: CPT | Performed by: FAMILY MEDICINE

## 2024-08-29 PROCEDURE — 90471 IMMUNIZATION ADMIN: CPT | Performed by: FAMILY MEDICINE

## 2024-08-29 ASSESSMENT — PROMIS GLOBAL HEALTH SCALE
CARRYOUT_SOCIAL_ACTIVITIES: VERY GOOD
RATE_GENERAL_HEALTH: GOOD
CARRYOUT_PHYSICAL_ACTIVITIES: COMPLETELY
RATE_AVERAGE_FATIGUE: MODERATE
RATE_MENTAL_HEALTH: GOOD
RATE_SOCIAL_SATISFACTION: VERY GOOD
RATE_AVERAGE_PAIN: 4
RATE_QUALITY_OF_LIFE: GOOD
RATE_PHYSICAL_HEALTH: GOOD
EMOTIONAL_PROBLEMS: SOMETIMES

## 2024-08-29 NOTE — PROGRESS NOTES
Meryl Loaiza is a 49 y.o. female here today a periodic health exam.  I reviewed previous preventative health measures including screening tests, immunizations and labs.      HPI   CURRENT COMPLAINTS OR CONCERNS:    DCIS of left breast status post excision- now on medication for about 4 more years.  I reviewed records from her specialist.    Had TLH and BSO 4/1/24.  She feels like she recovered well after that.     Patient would prefer colonoscopy next year.      PREVIOUS PREVENTATIVE HEALTH  Mammogram :  YES -- DATE 6/24/2024  Pap Test : YES -- DATE 4/1/2024  Colonoscopy : NO  Cologuard :  YES -- DATE 6/15/2022  Hepatitis C Antibody :  NO  HIV Screening : NO  Prevnar/Pneumovax : NO  Tdap : YES -- DATE 6/9/2022    Shingrix : NO  Yearly Flu Shot :  YES    CURRENT FINDINGS  Healthy Diet : YES  Exercise :  YES --  regular  Depression or Anxiety Issues : YES --  mild depression.  Taking herbal Ashwaganda.    Alcohol Use : YES --  very rare use.    Tobacco Use : NO  Drug Use : NO        Current Outpatient Medications:     exemestane (Aromasin) 25 mg tablet, Take 1 tablet (25 mg total) by mouth once daily.  Take after a meal.  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       Family History   Problem Relation Name Age of Onset    Stroke Paternal Grandfather Osvaldo Dale     Colon cancer Paternal Grandfather Osvaldo Dale     COPD Mother Silvia Dale     Depression Mother Silvia Dale     Hearing loss Mother Silvia Dale     Heart disease Mother Silvia Dale     Hyperlipidemia Mother Silvia Dale     Hypertension Mother Silvia Dale     Kidney disease Mother Silvia Dale     Miscarriages / Stillbirths Mother Silvia Dale     Hyperlipidemia Father Osvaldo Dale Jr.     Hypertension Father Osvaldo Dale Jr.     Vision loss Father Osvaldo Dale Jr.     Miscarriages / Stillbirths Maternal Grandmother Porsche Roy     Stroke Maternal Grandmother Porsche Roy     Stroke Paternal Grandmother Carina Dale     COPD Mother's Sister Imani Roy     Depression Mother's Sister Imani Roy     Hearing loss Mother's Sister Imani Roy     Hyperlipidemia Mother's Sister Imani Roy     Hypertension Mother's Sister Imani Roy     COPD Mother's Brother Benjamin Kirill     Hearing loss Mother's Brother Benjamin Kirill        Social History     Tobacco Use    Smoking status: Never    Smokeless tobacco: Never   Substance Use Topics    Alcohol use: Yes     Comment: With DCIS treatmemt has become 0 - 1.  Very occasional.    Drug use: Never       Immunization History   Administered Date(s) Administered    Flu vaccine (IIV4), preservative free *Check age/dose* 11/04/2022, 11/13/2023    Moderna COVID-19 vaccine, Fall 2023, 12 yeasrs and older (50mcg/0.5mL) 11/13/2023    Pfizer COVID-19 vaccine, bivalent, age 12  "years and older (30 mcg/0.3 mL) 11/04/2022    Pfizer Gray Cap SARS-CoV-2 06/18/2022    Pfizer Purple Cap SARS-CoV-2 03/24/2021, 04/14/2021, 12/16/2021    Tdap vaccine, age 7 year and older (BOOSTRIX, ADACEL) 12/11/2012, 03/11/2014, 06/09/2022    Zoster vaccine, recombinant, adult (SHINGRIX) 08/29/2024         Objective    Vitals:    08/29/24 1120   BP: 118/76   Pulse: 81   Resp: 16   Temp: 36.3 °C (97.4 °F)   Weight: 63.5 kg (140 lb)   Height: 1.651 m (5' 5\")        Physical Exam  Vitals and nursing note reviewed.   Constitutional:       General: She is not in acute distress.     Appearance: Normal appearance.   HENT:      Head: Normocephalic and atraumatic.      Right Ear: Tympanic membrane, ear canal and external ear normal.      Left Ear: Tympanic membrane, ear canal and external ear normal.      Nose: Nose normal.      Mouth/Throat:      Mouth: Mucous membranes are moist.      Pharynx: Oropharynx is clear.   Eyes:      Extraocular Movements: Extraocular movements intact.      Conjunctiva/sclera: Conjunctivae normal.      Pupils: Pupils are equal, round, and reactive to light.   Cardiovascular:      Rate and Rhythm: Normal rate and regular rhythm.      Pulses: Normal pulses.      Heart sounds: Normal heart sounds. No murmur heard.     No friction rub. No gallop.   Pulmonary:      Effort: Pulmonary effort is normal. No respiratory distress.      Breath sounds: Normal breath sounds.   Chest:   Breasts:     Right: Normal. No mass, nipple discharge or skin change.      Left: Normal. No mass, nipple discharge or skin change.   Abdominal:      General: Abdomen is flat. Bowel sounds are normal. There is no distension.      Palpations: Abdomen is soft.      Tenderness: There is no abdominal tenderness.   Musculoskeletal:         General: Normal range of motion.      Cervical back: Normal range of motion and neck supple.   Lymphadenopathy:      Cervical: No cervical adenopathy.      Upper Body:      Right upper body: No " axillary adenopathy.      Left upper body: No axillary adenopathy.   Skin:     General: Skin is warm and dry.      Findings: No lesion or rash.   Neurological:      General: No focal deficit present.      Mental Status: She is alert. Mental status is at baseline.   Psychiatric:         Mood and Affect: Mood normal.         Behavior: Behavior normal.         Thought Content: Thought content normal.         Judgment: Judgment normal.            Assessment    1. Encounter for preventive health examination  Comprehensive Metabolic Panel, CBC, Lipid Panel, TSH with reflex to Free T4 if abnormal, Vitamin D 25-Hydroxy,Total (for eval of Vitamin D levels)   Recommend regular exercise, balanced diet, regular dental exams, and healthy habits.  Appropriate labs ordered or reviewed.  I recommend to eat plenty of plant foods (such as whole-grain products, fruits, and vegetables) and a moderate amount of lean and low-fat, animal-based food (meat and dairy products).  When shopping, choose lean meats, fish, and poultry. I recommend to continue regular aerobic exercise.  I recommend a yearly flu shot in the fall and I recommend a yearly wellness exam.      She is due for colon cancer screening next year and she would prefer a colonoscopy so we will order it at the appropriate time.    She will receive Shingrix No. 1 today and she should receive the second in 2 to 6 months.     2. Ductal carcinoma in situ (DCIS) of left breast     This is stable and managed by her specialist as above.     3. History of hysterectomy with bilateral oophorectomy              Anticipatory Guidance     Eat well: Eat a balanced diet that includes lots of fruits and vegetables, whole grains, nuts, seeds, and legumes. Limit processed foods, sugar, saturated fat, and salt. Aim to eat at least five servings of fruits and vegetables per day, and no more than 1 teaspoon of salt.    Exercise: Try to exercise at least 30 minutes most days of the week.    Sleep:  Aim to get 7-9 hours of sleep each night. Establish a bedtime routine and create a sleep-friendly environment.    Stay hydrated: Drink water and limit sugary beverages.    Reduce sitting time: Be mindful of your screen time.    Keep company with good people:  Set limits and boundaries.  Be selective.    Manage stress: Take breaks from the news, talk with someone you trust.    Other tips: Avoid drugs, tobacco and vaping.  Maintain a healthy weight, get regular health checkups, and limit alcohol          Orders Placed This Encounter   Procedures    Zoster vaccine, recombinant, adult (SHINGRIX)    Comprehensive Metabolic Panel    CBC    Lipid Panel    TSH with reflex to Free T4 if abnormal    Vitamin D 25-Hydroxy,Total (for eval of Vitamin D levels)        No orders of the defined types were placed in this encounter.

## 2024-08-30 PROBLEM — Z90.710 HISTORY OF HYSTERECTOMY WITH BILATERAL OOPHORECTOMY: Status: ACTIVE | Noted: 2024-08-30

## 2024-08-30 PROBLEM — Z90.722 HISTORY OF HYSTERECTOMY WITH BILATERAL OOPHORECTOMY: Status: ACTIVE | Noted: 2024-08-30

## 2024-09-07 ENCOUNTER — LAB (OUTPATIENT)
Dept: LAB | Facility: LAB | Age: 49
End: 2024-09-07
Payer: COMMERCIAL

## 2024-09-07 DIAGNOSIS — Z00.00 ENCOUNTER FOR PREVENTIVE HEALTH EXAMINATION: ICD-10-CM

## 2024-09-07 LAB
25(OH)D3 SERPL-MCNC: 50 NG/ML (ref 30–100)
ALBUMIN SERPL BCP-MCNC: 4.3 G/DL (ref 3.4–5)
ALP SERPL-CCNC: 54 U/L (ref 33–110)
ALT SERPL W P-5'-P-CCNC: 16 U/L (ref 7–45)
ANION GAP SERPL CALC-SCNC: 10 MMOL/L (ref 10–20)
AST SERPL W P-5'-P-CCNC: 18 U/L (ref 9–39)
BILIRUB SERPL-MCNC: 0.4 MG/DL (ref 0–1.2)
BUN SERPL-MCNC: 13 MG/DL (ref 6–23)
CALCIUM SERPL-MCNC: 9.3 MG/DL (ref 8.6–10.3)
CHLORIDE SERPL-SCNC: 104 MMOL/L (ref 98–107)
CHOLEST SERPL-MCNC: 175 MG/DL (ref 0–199)
CHOLESTEROL/HDL RATIO: 2.6
CO2 SERPL-SCNC: 30 MMOL/L (ref 21–32)
CREAT SERPL-MCNC: 0.8 MG/DL (ref 0.5–1.05)
EGFRCR SERPLBLD CKD-EPI 2021: 90 ML/MIN/1.73M*2
ERYTHROCYTE [DISTWIDTH] IN BLOOD BY AUTOMATED COUNT: 12.7 % (ref 11.5–14.5)
GLUCOSE SERPL-MCNC: 89 MG/DL (ref 74–99)
HCT VFR BLD AUTO: 39.7 % (ref 36–46)
HDLC SERPL-MCNC: 66.3 MG/DL
HGB BLD-MCNC: 13.4 G/DL (ref 12–16)
LDLC SERPL CALC-MCNC: 88 MG/DL
MCH RBC QN AUTO: 30.1 PG (ref 26–34)
MCHC RBC AUTO-ENTMCNC: 33.8 G/DL (ref 32–36)
MCV RBC AUTO: 89 FL (ref 80–100)
NON HDL CHOLESTEROL: 109 MG/DL (ref 0–149)
NRBC BLD-RTO: 0 /100 WBCS (ref 0–0)
PLATELET # BLD AUTO: 137 X10*3/UL (ref 150–450)
POTASSIUM SERPL-SCNC: 4.4 MMOL/L (ref 3.5–5.3)
PROT SERPL-MCNC: 6.5 G/DL (ref 6.4–8.2)
RBC # BLD AUTO: 4.45 X10*6/UL (ref 4–5.2)
SODIUM SERPL-SCNC: 140 MMOL/L (ref 136–145)
TRIGL SERPL-MCNC: 105 MG/DL (ref 0–149)
TSH SERPL-ACNC: 2.41 MIU/L (ref 0.44–3.98)
VLDL: 21 MG/DL (ref 0–40)
WBC # BLD AUTO: 5 X10*3/UL (ref 4.4–11.3)

## 2024-09-07 PROCEDURE — 36415 COLL VENOUS BLD VENIPUNCTURE: CPT

## 2024-09-07 PROCEDURE — 84443 ASSAY THYROID STIM HORMONE: CPT

## 2024-09-07 PROCEDURE — 80053 COMPREHEN METABOLIC PANEL: CPT

## 2024-09-07 PROCEDURE — 80061 LIPID PANEL: CPT

## 2024-09-07 PROCEDURE — 85027 COMPLETE CBC AUTOMATED: CPT

## 2024-09-07 PROCEDURE — 82306 VITAMIN D 25 HYDROXY: CPT

## 2024-10-30 ENCOUNTER — OFFICE VISIT (OUTPATIENT)
Dept: HEMATOLOGY/ONCOLOGY | Facility: CLINIC | Age: 49
End: 2024-10-30
Payer: COMMERCIAL

## 2024-10-30 VITALS
HEART RATE: 65 BPM | OXYGEN SATURATION: 98 % | RESPIRATION RATE: 14 BRPM | SYSTOLIC BLOOD PRESSURE: 120 MMHG | WEIGHT: 144.8 LBS | TEMPERATURE: 97.7 F | BODY MASS INDEX: 24.1 KG/M2 | DIASTOLIC BLOOD PRESSURE: 77 MMHG

## 2024-10-30 DIAGNOSIS — D05.12 DUCTAL CARCINOMA IN SITU (DCIS) OF LEFT BREAST: ICD-10-CM

## 2024-10-30 PROCEDURE — 99214 OFFICE O/P EST MOD 30 MIN: CPT | Performed by: STUDENT IN AN ORGANIZED HEALTH CARE EDUCATION/TRAINING PROGRAM

## 2024-10-30 PROCEDURE — 1036F TOBACCO NON-USER: CPT | Performed by: STUDENT IN AN ORGANIZED HEALTH CARE EDUCATION/TRAINING PROGRAM

## 2024-10-30 ASSESSMENT — PAIN SCALES - GENERAL: PAINLEVEL_OUTOF10: 0-NO PAIN

## 2024-11-04 ENCOUNTER — APPOINTMENT (OUTPATIENT)
Dept: PRIMARY CARE | Facility: CLINIC | Age: 49
End: 2024-11-04
Payer: COMMERCIAL

## 2024-11-06 ENCOUNTER — APPOINTMENT (OUTPATIENT)
Dept: PRIMARY CARE | Facility: CLINIC | Age: 49
End: 2024-11-06
Payer: COMMERCIAL

## 2024-11-06 VITALS
BODY MASS INDEX: 24.3 KG/M2 | TEMPERATURE: 98.1 F | RESPIRATION RATE: 16 BRPM | HEART RATE: 86 BPM | DIASTOLIC BLOOD PRESSURE: 70 MMHG | SYSTOLIC BLOOD PRESSURE: 118 MMHG | WEIGHT: 146 LBS

## 2024-11-06 DIAGNOSIS — D05.12 DUCTAL CARCINOMA IN SITU (DCIS) OF LEFT BREAST: Primary | ICD-10-CM

## 2024-11-06 DIAGNOSIS — N60.01 CYST OF NIPPLE, RIGHT: ICD-10-CM

## 2024-11-06 DIAGNOSIS — N95.2 ATROPHIC VAGINITIS: ICD-10-CM

## 2024-11-06 PROCEDURE — 99214 OFFICE O/P EST MOD 30 MIN: CPT | Performed by: FAMILY MEDICINE

## 2024-11-06 NOTE — PROGRESS NOTES
"Meryl Loaiza is a 49 y.o. female here today for   Chief Complaint   Patient presents with    breast exam     \"White head\" on right nipple         HPI     Patient notices a small white cyst in the center of her right nipple.  No pain tenderness or dc.  Noticed about one month ago.  No change.  No lumps or tenderness.  She has a history of left breast cancer and has been following up appropriately with her specialists.    Also has noticed atrophic vaginitis.  Decreased libido.  Less vaginal lubrication and more pain with sex.   She says the area feels irritated in the vagina and she would like to try a topical estrogen cream.  She had previously discussed this with her specialist who said this would be safe even with her history of breast cancer.  She asks if I can prescribe this.  She is  and monogamous for many years.      Current Outpatient Medications:     multivitamin tablet, Take 1 tablet by mouth once daily., Disp: , Rfl:     omega-3/dha/epa/fish oil (OMEGA-3 ORAL), Take by mouth., Disp: , Rfl:     SUMAtriptan (Imitrex) 25 mg tablet, Take by mouth twice a day., Disp: , Rfl:     exemestane (Aromasin) 25 mg tablet, Take 1 tablet (25 mg total) by mouth once daily.  Take after a meal.  Try to take at the same time each day. (Patient not taking: Reported on 11/6/2024), Disp: 90 tablet, Rfl: 2    Patient Active Problem List   Diagnosis    Anxiety disorder    Calcific Achilles tendinitis of left lower extremity    Changing nevus    Melasma    Migraine headache    Postural dizziness    Recurrent epistaxis    Solar lentigo    Breast screening    IUD contraception    Choroidal nevus, left eye    Ductal carcinoma in situ (DCIS) of left breast    Pelvic floor weakness    Presbyopia of both eyes    RCE (recurrent corneal erosion)    Regular astigmatism of both eyes    Stress incontinence    Pelvic mass    Abnormal uterine bleeding    Achilles tendinitis    Low back pain    History of hysterectomy with " bilateral oophorectomy    Cyst of nipple, right         No results found for this or any previous visit (from the past 4 weeks).     Objective    Visit Vitals    Visit Vitals  /70   Pulse 86   Temp 36.7 °C (98.1 °F)   Resp 16   Wt 66.2 kg (146 lb)   BMI 24.30 kg/m²   OB Status Hysterectomy   Smoking Status Never   BSA 1.74 m²       Body mass index is 24.3 kg/m².     Physical Exam     Breasts-in the center of her right nipple is a small whitish cyst.  There is no tenderness or induration.  I do not feel any lumps or masses in either breast on exam.    Genitalia-patient does have some atrophic changes of the labia and vagina.  There is no discharge or erythema.    Assessment & Plan  Ductal carcinoma in situ (DCIS) of left breast    Orders:    BI mammo bilateral diagnostic tomosynthesis; Future    Cyst of nipple, right  The small cyst in her right nipple is likely a benign lesion.  She has a upcoming diagnostic mammogram for the left side and I will change it to a bilateral diagnostic mammogram for further evaluation.  She does have a follow-up appointment with her breast specialist in December already and I recommend that she keep this and they can review and discuss this further.       Atrophic vaginitis  We discussed treatment options including no treatment.  The patient would like to try a topical estrogen cream and I think this is reasonable.  She will use conjugated estrogen cream 1 g PV daily for 2 weeks and then 3 times a week.  If she is not seeing improvement or satisfactory results in 2 months she should make a follow-up appointment for recheck.  Orders:    estrogens, conjugated, (Premarin) vaginal cream; 1 gm PV daily x 2 weeks and then 3 x per week.               Orders Placed This Encounter   Procedures    BI mammo bilateral diagnostic tomosynthesis        No orders of the defined types were placed in this encounter.

## 2024-11-06 NOTE — ASSESSMENT & PLAN NOTE
The small cyst in her right nipple is likely a benign lesion.  She has a upcoming diagnostic mammogram for the left side and I will change it to a bilateral diagnostic mammogram for further evaluation.  She does have a follow-up appointment with her breast specialist in December already and I recommend that she keep this and they can review and discuss this further.        29-Jun-2021

## 2024-11-07 ENCOUNTER — TELEPHONE (OUTPATIENT)
Dept: PRIMARY CARE | Facility: CLINIC | Age: 49
End: 2024-11-07
Payer: COMMERCIAL

## 2024-11-08 ENCOUNTER — APPOINTMENT (OUTPATIENT)
Dept: PRIMARY CARE | Facility: CLINIC | Age: 49
End: 2024-11-08
Payer: COMMERCIAL

## 2024-11-08 DIAGNOSIS — Z23 IMMUNIZATION DUE: ICD-10-CM

## 2024-11-08 PROCEDURE — 90471 IMMUNIZATION ADMIN: CPT | Performed by: FAMILY MEDICINE

## 2024-11-08 PROCEDURE — 90750 HZV VACC RECOMBINANT IM: CPT | Performed by: FAMILY MEDICINE

## 2024-11-13 DIAGNOSIS — D05.12 DUCTAL CARCINOMA IN SITU (DCIS) OF LEFT BREAST: Primary | ICD-10-CM

## 2024-11-13 RX ORDER — TAMOXIFEN CITRATE 20 MG/1
20 TABLET ORAL DAILY
Qty: 30 TABLET | Refills: 11 | Status: SHIPPED | OUTPATIENT
Start: 2024-11-13 | End: 2025-11-13

## 2024-12-09 ENCOUNTER — HOSPITAL ENCOUNTER (OUTPATIENT)
Dept: RADIOLOGY | Facility: HOSPITAL | Age: 49
Discharge: HOME | End: 2024-12-09
Payer: COMMERCIAL

## 2024-12-09 VITALS — WEIGHT: 142 LBS | BODY MASS INDEX: 23.66 KG/M2 | HEIGHT: 65 IN

## 2024-12-09 DIAGNOSIS — R92.8 OTHER ABNORMAL AND INCONCLUSIVE FINDINGS ON DIAGNOSTIC IMAGING OF BREAST: ICD-10-CM

## 2024-12-09 DIAGNOSIS — D05.12 DUCTAL CARCINOMA IN SITU (DCIS) OF LEFT BREAST: ICD-10-CM

## 2024-12-09 PROCEDURE — 77062 BREAST TOMOSYNTHESIS BI: CPT | Performed by: RADIOLOGY

## 2024-12-09 PROCEDURE — 77066 DX MAMMO INCL CAD BI: CPT | Performed by: RADIOLOGY

## 2024-12-09 PROCEDURE — 77062 BREAST TOMOSYNTHESIS BI: CPT

## 2024-12-11 ENCOUNTER — TELEPHONE (OUTPATIENT)
Dept: PRIMARY CARE | Facility: CLINIC | Age: 49
End: 2024-12-11
Payer: COMMERCIAL

## 2024-12-11 DIAGNOSIS — R92.8 MAMMOGRAM ABNORMAL: ICD-10-CM

## 2024-12-11 NOTE — TELEPHONE ENCOUNTER
----- Message from Oswaldo Dunaway sent at 12/11/2024  8:25 AM EST -----  Please inform the patient that her diagnostic mammogram showed no change in the benign-appearing calcifications in the left breast.  The radiologist thinks these are very likely postsurgical skin calcifications.  They do recommend a 6-month mammogram follow-up.  Please order a bilateral diagnostic mammogram to be done in 6 months.

## 2024-12-11 NOTE — RESULT ENCOUNTER NOTE
Please inform the patient that her diagnostic mammogram showed no change in the benign-appearing calcifications in the left breast.  The radiologist thinks these are very likely postsurgical skin calcifications.  They do recommend a 6-month mammogram follow-up.  Please order a bilateral diagnostic mammogram to be done in 6 months.

## 2024-12-12 ENCOUNTER — PATIENT MESSAGE (OUTPATIENT)
Dept: SURGICAL ONCOLOGY | Facility: HOSPITAL | Age: 49
End: 2024-12-12
Payer: COMMERCIAL

## 2024-12-19 NOTE — PROGRESS NOTES
BREAST SURGICAL ONCOLOGY FOLLOW UP     Assessment/Plan     1. left breast DCIS g3 ER 95% medial central breast spanning 0.6cm on final pathology  [id number]     Clinical breast exam today is normal.  There is no evidence of local recurrence. Mammogram from 12/9/2024 with probably benign left breast calcifications.  Follow up mammogram recommended in 6 months    Continue follow up with medical oncology as scheduled.    Will follow up in the breast center with my NP partner and left mammogram in 6 months.      Subjective   Meryl Loaiza is a 49 y.o. female presents today for follow up carcinoma of the left breast.     Treatment history  Surgery: 9/1/2023 left MS PM DCIS g3 0.6cm margins negative. [id number]   Radiation: Completed XRT 10/16/2023.  Systemic therapy: anastrozole -> exemestane -> tamoxifen.  Tolerating tamoxifen well.    Had issues with vaginal bleeding.  Had work up and eventual hysterectomy BSO with benign pathology.    Noticed a white spot on the tip of the RIGHT nipple.  Bilateral mammogram ordered by her PCP on 12/9/2024 BIRADS 2, benign.      Review of Systems   Constitutional symptoms: Denies generalized fatigue.  Denies weight change, fevers/chills, difficulty sleeping   Eyes: Denies double vision, glaucoma, cataracts.  Ear/nose/throat/mouth: Denies hearing changes, sore throat, sinus problems.  Cardiovascular: No chest pain.  Denies irregular heartbeat.  Denies ankle swelling.  Respiratory: No wheezing, cough, or shortness of breath.  Gastrointestinal: No abdominal pain,  No nausea/vomiting.  No indigestion/heartburn.  No change in bowel habits.  No constipation or diarrhea.   Genitourinary: No urinary incontinence.  No urinary frequency.  No painful urination.  Musculoskeletal: No bone pain, no muscle pain, no joint pain.   Integumentary: No rash. No masses.  No changes in moles.  No easy bruising.  Neurological: No headaches.  No tremors. No numbness/tingling.  Psychiatric: No anxiety.  No depression.  Endocrine: No excessive thirst.  Not too hot or too cold.  Not tired or fatigued.    Hematological/lymphatic: No swollen glands or blood clotting problems.  No bruising.    Objective   Physical Exam  General: Alert and oriented x 3.  Mood and affect are appropriate.  HEENT: EOMI, PERRLA.   Neck: supple, no masses, no cervical adenopathy.  Cardiovascular: no lower extremity edema.  Pulmonary: breathing non labored on room air.  GI: Abdomen soft, no masses. No hepatomegaly or splenomegaly.  Lymph nodes: No supraclavicular or axillary adenopathy bilaterally.  Musculoskeletal: Full range of motion in the upper extremities bilaterally.  Neuro: denies dizziness, tremors    Breasts: The breasts were examined in both the seated and supine positions.  Well healed bilateral wise pattern incisions.  RIGHT: The nipple is everted without nipple discharge.  There are no skin changes, skin thickening, or dimpling. There are no masses palpated in the RIGHT breast.   LEFT: The nipple is everted without nipple discharge.  There are no skin changes, skin thickening, or dimpling. There are no masses palpated in the LEFT breast.       Radiology review: All images and reports were personally reviewed.         Shelly Martínez, DO

## 2024-12-23 ENCOUNTER — OFFICE VISIT (OUTPATIENT)
Dept: SURGICAL ONCOLOGY | Facility: HOSPITAL | Age: 49
End: 2024-12-23
Payer: COMMERCIAL

## 2024-12-23 ENCOUNTER — APPOINTMENT (OUTPATIENT)
Dept: RADIOLOGY | Facility: HOSPITAL | Age: 49
End: 2024-12-23
Payer: COMMERCIAL

## 2024-12-23 VITALS — BODY MASS INDEX: 23.66 KG/M2 | WEIGHT: 142 LBS | RESPIRATION RATE: 16 BRPM | HEIGHT: 65 IN

## 2024-12-23 DIAGNOSIS — R92.8 ABNORMAL FINDING ON BREAST IMAGING: ICD-10-CM

## 2024-12-23 PROCEDURE — 3008F BODY MASS INDEX DOCD: CPT | Performed by: SURGERY

## 2024-12-23 PROCEDURE — 99214 OFFICE O/P EST MOD 30 MIN: CPT | Performed by: SURGERY

## 2025-01-30 DIAGNOSIS — N95.2 ATROPHIC VAGINITIS: Primary | ICD-10-CM

## 2025-01-30 RX ORDER — ESTRADIOL 0.1 MG/G
1 CREAM VAGINAL 3 TIMES WEEKLY
Qty: 42.5 G | Refills: 12 | Status: SHIPPED | OUTPATIENT
Start: 2025-01-31

## 2025-01-30 NOTE — PROGRESS NOTES
Meryl Loaiza is a 49 y.o. female here today for No chief complaint on file.       HPI         Current Outpatient Medications:     [START ON 1/31/2025] estradiol (Estrace) 0.01 % (0.1 mg/gram) vaginal cream, Insert 0.25 Applicatorfuls (1 g) into the vagina 3 times a week., Disp: 42.5 g, Rfl: 12    estrogens, conjugated, (Premarin) vaginal cream, 1 gm PV daily x 2 weeks and then 3 x per week., Disp: 30 g, Rfl: 11    multivitamin tablet, Take 1 tablet by mouth once daily., Disp: , Rfl:     SUMAtriptan (Imitrex) 25 mg tablet, Take by mouth twice a day., Disp: , Rfl:     tamoxifen (Nolvadex) 20 mg tablet, Take 1 tablet (20 mg total) by mouth once daily.  Take with water or any other nonalcoholic drink with or without food at around the same time(s) every day., Disp: 30 tablet, Rfl: 11    Patient Active Problem List   Diagnosis    Anxiety disorder    Calcific Achilles tendinitis of left lower extremity    Changing nevus    Melasma    Migraine headache    Postural dizziness    Recurrent epistaxis    Solar lentigo    Breast screening    IUD contraception    Choroidal nevus, left eye    Ductal carcinoma in situ (DCIS) of left breast    Pelvic floor weakness    Presbyopia of both eyes    RCE (recurrent corneal erosion)    Regular astigmatism of both eyes    Stress incontinence    Pelvic mass    Abnormal uterine bleeding    Achilles tendinitis    Low back pain    History of hysterectomy with bilateral oophorectomy    Cyst of nipple, right         Objective    Visit Vitals    Visit Vitals  OB Status Hysterectomy   Smoking Status Never       There is no height or weight on file to calculate BMI.     Physical Exam         Assessment & Plan               No orders of the defined types were placed in this encounter.       No orders of the defined types were placed in this encounter.

## 2025-04-30 ENCOUNTER — OFFICE VISIT (OUTPATIENT)
Dept: HEMATOLOGY/ONCOLOGY | Facility: CLINIC | Age: 50
End: 2025-04-30
Payer: COMMERCIAL

## 2025-04-30 VITALS
RESPIRATION RATE: 16 BRPM | DIASTOLIC BLOOD PRESSURE: 72 MMHG | WEIGHT: 155.3 LBS | BODY MASS INDEX: 25.84 KG/M2 | TEMPERATURE: 98.1 F | OXYGEN SATURATION: 99 % | HEART RATE: 75 BPM | SYSTOLIC BLOOD PRESSURE: 108 MMHG

## 2025-04-30 DIAGNOSIS — D05.12 DUCTAL CARCINOMA IN SITU (DCIS) OF LEFT BREAST: ICD-10-CM

## 2025-04-30 PROCEDURE — 99214 OFFICE O/P EST MOD 30 MIN: CPT | Performed by: STUDENT IN AN ORGANIZED HEALTH CARE EDUCATION/TRAINING PROGRAM

## 2025-04-30 PROCEDURE — 1036F TOBACCO NON-USER: CPT | Performed by: STUDENT IN AN ORGANIZED HEALTH CARE EDUCATION/TRAINING PROGRAM

## 2025-04-30 RX ORDER — TAMOXIFEN CITRATE 10 MG/1
10 TABLET ORAL DAILY
Qty: 30 TABLET | Refills: 11 | Status: SHIPPED | OUTPATIENT
Start: 2025-04-30 | End: 2026-04-30

## 2025-04-30 ASSESSMENT — PAIN SCALES - GENERAL: PAINLEVEL_OUTOF10: 0-NO PAIN

## 2025-04-30 NOTE — PROGRESS NOTES
Patient ID: Meryl Loaiza is a 50 y.o. female.    The patient presents to clinic today for her history of breast cancer.     Cancer Staging   No matching staging information was found for the patient.        Diagnostic/Therapeutic History:  Treatment Synopsis:      - On 6/20/2023 she had screening mammogram that showed calcifications in the left breast at 9:00 at middle depth.  No suspicious masses or calcification of the right  breast     -On 7/14/2023 she had diagnostic mammogram that showed extremely dense breast tissue with pleomorphic calcification in the central medial left breast at middle depth.  There are group of calcifications measuring 1.8 cm l     -Left breast calcifications stereotactic guided core needle biopsy showed ductal carcinoma in situ, high nuclear grade, solid and cribriform patterns with associated necrosis.  ER +95% grade 3     -On 9/1/2023:  Dr. Martínez performed left partial mastectomy that showed DCIS grade 3,  measuring 0.6cm with path stage: pTisNxMx     -Completed radiation therapy on 10/16/2023    - On 04/01/2024: [facility] BSO for benign pelvic mass     - started on anastrozole, switched because of joint pains to exemestane. Developed weight gain on exemestane, hold for now- currently on tamoxifen doing okay      History of Present illness (HPI)/Interval History:    Doing well    She denies any fevers or chills.    She denies any chest pain or breathing issues.     She denies any vision changes or headache issues, dizziness, loss of balance, neuropathy and no falls.     She denies any new or unexplained bone aches or pains.           14 pts review of system otherwise unremarkable      PMH: none     PSH: breast reduction in the 90s     Allergies/meds reviewed     Reproductive hx: Menarche 11, AFLB: 27, menopause yes, HRT: no, has Mirena IUD in place since 7 years        Review of Systems:  14-point ROS otherwise negative, as per HPI.    Past Medical History:   Diagnosis Date     Abnormal finding on breast imaging 10/03/2023    Achilles tendinitis, left leg     Achilles tendinitis of left lower extremity    Anxiety     Breast cancer     Dark urine 10/10/2023    Migraine, unspecified, not intractable, without status migrainosus 08/22/2019    Migraine    Nevus 06/12/2023    Personal history of irradiation     Thrombocytopenia (CMS-HCC) 06/12/2023    Weight gain 06/12/2023       Past Surgical History:   Procedure Laterality Date    BREAST LUMPECTOMY      BREAST SURGERY  12/1993    HYSTERECTOMY  04/01/2024    OTHER SURGICAL HISTORY  08/22/2019    Breast reduction    WISDOM TOOTH EXTRACTION  2008?       Social History     Socioeconomic History    Marital status:    Tobacco Use    Smoking status: Never    Smokeless tobacco: Never   Substance and Sexual Activity    Alcohol use: Yes     Comment: With DCIS treatmemt has become 0 - 1.  Very occasional.    Drug use: Never    Sexual activity: Yes     Partners: Male     Birth control/protection: Post-menopausal     Comment: Hysterectomy 4/1/2024       No Known Allergies      Current Outpatient Medications:     estradiol (Estrace) 0.01 % (0.1 mg/gram) vaginal cream, Insert 0.25 Applicatorfuls (1 g) into the vagina 3 times a week., Disp: 42.5 g, Rfl: 12    multivitamin tablet, Take 1 tablet by mouth once daily., Disp: , Rfl:     SUMAtriptan (Imitrex) 25 mg tablet, Take by mouth twice a day., Disp: , Rfl:     tamoxifen (Nolvadex) 20 mg tablet, Take 1 tablet (20 mg total) by mouth once daily.  Take with water or any other nonalcoholic drink with or without food at around the same time(s) every day., Disp: 30 tablet, Rfl: 11     Objective    BSA: 1.8 meters squared  /72 (BP Location: Left arm, Patient Position: Sitting)   Pulse 75   Temp 36.7 °C (98.1 °F) (Temporal)   Resp 16   Wt 70.4 kg (155 lb 4.8 oz)   SpO2 99%   BMI 25.84 kg/m²     ECOG Performance Status: 0    Physical Exam    Constitutional: Well developed, awake/alert/oriented  x3,  no distress, alert and cooperative   Eyes: PERRL, EOMI, clear sclera   ENMT: mucous membranes moist, no apparent injury,  no lesions seen   Head/Neck: Neck supple, no apparent injury, thyroid  without mass or tenderness, No JVD, trachea midline, no bruits   Respiratory/Thorax: Patent airways, CTAB, normal  breath sounds with good chest expansion, thorax symmetric   Cardiovascular: Regular, rate and rhythm, no murmurs,  2+ equal pulses of the extremities, normal S 1and S 2   Extremities: normal extremities, no cyanosis edema,  contusions or wounds, no clubbing   Breast: deferred this visit       Laboratory Data:  Lab Results   Component Value Date    WBC 5.0 09/07/2024    HGB 13.4 09/07/2024    HCT 39.7 09/07/2024    MCV 89 09/07/2024     (L) 09/07/2024       Chemistry    Lab Results   Component Value Date/Time     09/07/2024 0813    K 4.4 09/07/2024 0813     09/07/2024 0813    CO2 30 09/07/2024 0813    BUN 13 09/07/2024 0813    CREATININE 0.80 09/07/2024 0813    Lab Results   Component Value Date/Time    CALCIUM 9.3 09/07/2024 0813    ALKPHOS 54 09/07/2024 0813    AST 18 09/07/2024 0813    ALT 16 09/07/2024 0813    BILITOT 0.4 09/07/2024 0813             Radiology:  BI mammo bilateral diagnostic tomosynthesis  Narrative: Interpreted By:  Prabhu Reddy,   STUDY:  BI MAMMO BILATERAL DIAGNOSTIC TOMOSYNTHESIS;  12/9/2024 11:22 am      ACCESSION NUMBER(S):  [id number]      ORDERING CLINICIAN:  BAM HILL      INDICATION:  Follow-up for probably benign calcifications in the left breast.      ,R92.8 Other abnormal and inconclusive findings on diagnostic imaging  of breast      COMPARISON:  06/24/2020      FINDINGS:  MAMMOGRAPHY: 2D and tomosynthesis images were reviewed at 1 mm slice  thickness.      Density:  The breasts are extremely dense, which lowers the  sensitivity of mammography.      Redemonstration of expected postsurgical changes in the left breast.  The previously described calcifications  are noted again. The still  demonstrate a round morphology with loose distribution. These are  largely confined to the skin and highly favored to represent  postsurgical skin calcifications. No new suspicious masses or  calcifications are identified in either breast.      The patient also reported an episode non spontaneous white discharge.  Clinical follow-up is recommended for this as it is not considered  suspicious for malignancy requiring further imaging evaluation.      Impression: No change in the probably benign calcifications in the left breast  favored to be postsurgical in nature. A six-month mammographic  follow-up is recommended.      BI-RADS CATEGORY:  BI-RADS Category:  3 Probably Benign.  Recommendation:  Short-term Interval Follow-up Imaging.  Recommended Date:  6 Months.  Laterality:  Left.      For any future breast imaging appointments, please call [phone] ([phone]).          MACRO:  None      Signed by: Prabhu Reddy 12/9/2024 3:43 PM  Dictation workstation:   [id number]       BI mammo left diagnostic 07/14/2023    Narrative  Interpreted By:  EMIL CASTELLANO MD  MRN: [id number]  Patient Name: STEFANIE EDMONDS    STUDY:  DIGITAL DIAG MAMMO LEFT UNILAT;  7/14/2023 3:30 pm    ACCESSION NUMBER(S):  [id number]    ORDERING CLINICIAN:  BAM HILL    INDICATION:  Patient recalled from screening mammography dated 06/20/2023 for  indeterminate left breast calcifications.    COMPARISON:  06/20/2023, 02/26/2020, 11/14/2018    FINDINGS:    The breast tissue is extremely dense, which may limit the sensitivity  of mammography. Orthogonal spot magnification views were performed.  In the central medial left breast at middle depth there are  pleomorphic grouped calcifications. The group of calcifications  measures 1.8 cm. A stereotactic biopsy is recommended.    Impression  Indeterminate left breast calcifications. A stereotactic biopsy is  recommended. This was discussed with the patient at the time of  her  visit with Dr. Bloom. A preprocedure form has been completed.  A  message was sent to the referring practioner at the time of this  dictation regarding these critical findings using the [employer] system.    BI-RADS CATEGORY:    Category: 4 - Suspicious.  Recommendation: Biopsy Recommended.    Method of Detection: Category Sdbt - 3D Screening    For any future breast imaging appointments, please call [phone] ([phone]).    [facility]          Assessment/Plan:  49 year old female previously healthy found to have new ER+ DCIS s/p PM , -On 9/1/2023:  Dr. Martínez performed left partial mastectomy that showed DCIS grade 3,  measuring 0.6cm with path stage:   pTisNxMx.   completed radiation therapy on 10/16. Currently on tamoxifen doing well    RTC in 6M with Linda Moscoso  Tamoxifen 10 mg daily  Next mammogram in 06/2025    At least 35minutes of direct consultation was spent reviewing the patient's chart as well as discussing and  reviewing the  cancer care plan including educating and answering  questions and concerns, greater than 50 percent spent in counseling and coordination  of care.             Dane Elena MD  Hematology and Medical Oncology  [facility]

## 2025-05-27 NOTE — PROGRESS NOTES
Meryl Loaiza female   1975 50 y.o.   [id number]      Chief Complaint  Annual mammogram and exam, history of left DCIS    History Of Present Illness  Meryl Loaiza is a very pleasant 50 y.o.  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 2023 Shelly Martínez performed left breast Magseed partial mastectomy. Final pathology revealed 0.6 cm DCIS, margins negative. She completed post-operative radiation on 10/16/2023. She started Anastrozole switching to Exemestane and then Tamoxifen 10mg, currently taking and tolerating well. She has a remote history of bilateral breast reduction in .   Stage 0 pTis    She presents today for left breast follow up and exam. She denies any new masses or lumps. She is currently being followed for stable left breast calcifications. She reports a left breast skin change only upon raising her arms. She noticed it about three weeks ago.     BREAST IMAGIN2024 Bilateral diagnostic mammogram, indicates BI-RADS Category 3. No change in the probably benign calcifications in the left breast favored to be postsurgical in nature. A six-month mammographic follow-up is recommended.    REPRODUCTIVE HISTORY: menarche age 11, , first birth age 27,  x 15-18 months, OCP's in the past, surgical menopause age 49, benign pathology, no HRT, extremely dense tissue    FAMILY CANCER HISTORY:   None     Surgical History  She has a past surgical history that includes Other surgical history (2019); Breast surgery (1993); [place] tooth extraction (?); Hysterectomy (2024); Breast lumpectomy; Mastectomy (2023); Oophorectomy (2024); Breast biopsy (2023); and Reduction mammaplasty (1993).     Social History  She reports that she has never smoked. She has never used smokeless tobacco. She reports that she does not currently use alcohol. She reports that she does not use  drugs.    Family History  Family History[1]     Allergies  Patient has no known allergies.    Medications  Current Outpatient Medications   Medication Instructions    estradiol (ESTRACE) 1 g, vaginal, 3 times weekly    multivitamin tablet 1 tablet, Daily    SUMAtriptan (Imitrex) 25 mg tablet 2 times daily    tamoxifen (NOLVADEX) 10 mg, oral, Daily, Take with water or any other nonalcoholic drink with or without food at around the same time(s) every day.         REVIEW OF SYSTEMS    Constitutional:  Negative for appetite change, fatigue, fever and unexpected weight change.   HENT:  Negative for ear pain, hearing loss, nosebleeds, sore throat and trouble swallowing.    Eyes:  Negative for discharge, itching and visual disturbance.   Respiratory:  Negative for cough, chest tightness and shortness of breath.    Cardiovascular:  Negative for chest pain, palpitations and leg swelling.   Breast: as indicated in HPI  Gastrointestinal:  Negative for abdominal pain, constipation, diarrhea and nausea.   Endocrine: Negative for cold intolerance and heat intolerance.   Genitourinary:  Negative for dysuria, frequency, hematuria, pelvic pain and vaginal bleeding.   Musculoskeletal:  Negative for arthralgias, back pain, gait problem, joint swelling and myalgias.   Skin:  Negative for color change and rash.   Allergic/Immunologic: Negative for environmental allergies and food allergies.   Neurological:  Negative for dizziness, tremors, speech difficulty, weakness, numbness and headaches.   Hematological:  Does not bruise/bleed easily.   Psychiatric/Behavioral:  Negative for agitation, dysphoric mood and sleep disturbance. The patient is not nervous/anxious.         Past Medical History  She has a past medical history of Abnormal finding on breast imaging (10/03/2023), Achilles tendinitis, left leg, Anxiety, BRCA1 negative, BRCA2 gene mutation negative, BRCA2 gene mutation positive I have not had genetic testing…. Was going to ask  for a referral at this appt.), Breast cancer, Breast cyst (December 2024 (I had some mystery cyst), Dark urine (10/10/2023), Migraine, unspecified, not intractable, without status migrainosus (08/22/2019), Nevus (06/12/2023), Personal history of irradiation, PONV (postoperative nausea and vomiting) (12/1993), Thrombocytopenia (06/12/2023), Urinary tract infection (10/17/2023), Varicella (1980?), and Weight gain (06/12/2023).     Physical Exam  Patient is alert and oriented x3 and in a relaxed and appropriate mood. Her gait is steady and hand grasps are equal. Sclera is clear. The breasts are nearly symmetrical. The tissue is soft without palpable abnormalities, discrete nodules or masses. Both breasts have well-healed pedicle method incisions. The left breast, inferior medial quadrant at inframammary fold with a mild skin divot along incisional line. Only visible upon raising arms. The skin and nipples appear normal. There is no cervical, supraclavicular or axillary lymphadenopathy. Heart rate and rhythm normal, S1 and S2 appreciated. The lungs are clear to auscultation bilaterally.     Physical Exam  Chest:              Last Recorded Vitals  Vitals:    06/10/25 1050   BP: 114/80   Pulse: 72   Resp: 16       Relevant Results   Time was spent discussing digital images of the radiology testing with the patient. I explained the results in depth, along with suggested explanation for follow up recommendations based on the testing results. BI-RADS Category 3    Imaging    Narrative & Impression   Interpreted By:  Aurea Nguyen,   STUDY:  BI MAMMO BILATERAL DIAGNOSTIC TOMOSYNTHESIS; BI US BREAST LIMITED  LEFT;  6/10/2025 12:10 pm; 6/10/2025 1:20 pm      ACCESSION NUMBER(S):  [id number]; [id number]      ORDERING CLINICIAN:  CARLOS JOSEPH      INDICATION:  Annual exam and follow-up of probably benign left breast  calcifications. Patient also reports skin dimpling in the left breast  with raising her arm. History of  left breast DCIS status post  lumpectomy and radiation in 2023 on endocrine therapy. Remote history  of bilateral breast reduction in 1993.      ,R92.8 Other abnormal and inconclusive findings on diagnostic imaging  of breast; ,Z87.2 Personal history of diseases of the skin and  subcutaneous tissue      COMPARISON:  12/09/2024, 06/24/2024      FINDINGS:  MAMMOGRAPHY: 2D and tomosynthesis images were reviewed at 1 mm slice  thickness.      Density:  The breasts are heterogeneously dense, which may obscure  small masses.      There are stable postsurgical changes of lumpectomy in central medial  left breast at middle and posterior depth. Round foci of  calcifications an additional hyperdense foci predominantly within the  skin of the left subareolar region are unchanged and most consistent  with postsurgical changes. There are bilateral benign postsurgical  changes of reduction mastopexy. No suspicious masses or  calcifications are identified in either breast.      ULTRASOUND: Targeted ultrasound with elastography was performed by a  registered sonographer in the area of skin dimpling in the left  breast at 7 o'clock 10 cm from the nipple. No suspicious sonographic  abnormality is identified. The tissue is soft on elastography. The  area of skin dimpling is located on the skin excision site.      IMPRESSION:  1. Probably benign left breast calcifications, most likely  postsurgical changes. Final mammographic follow-up is recommended in  1 year.      2. No mammographic or sonographic abnormality is identified in the  area of reported skin dimpling. Clinical follow-up is recommended.      3. No mammographic evidence of malignancy in the right breast.      BI-RADS CATEGORY:  BI-RADS Category:  3 Probably Benign.  Recommendation:  Clinical Follow-up and Short-term Interval Follow-up  Imaging. Recommended Date:  1 Year.  Laterality:  Left.     Assessment/Plan   Normal clinical exam and stable imaging, left breast  calcifications, stable, history of left DCIS, Tamoxifen therapy, no family history of breast cancer, extremely dense tissue    Plan: Return in one year for bilateral diagnostic mammogram and office visit. Continue Tamoxifen 10mg daily.     Patient Discussion/Summary  Your clinical examination and imaging are stable. Please return in one year for bilateral diagnostic mammogram and office visit or sooner if you have any problems or concerns. Continue Tamoxifen 10mg daily.     You can see your health information, review clinical summaries from office visits & test results online when you follow your health with MY  Chart, a personal health record. To sign up go to [email or web address]. If you need assistance with signing up or trouble getting into your account call [employer] Patient Line 24/7 at [phone].    My office phone number is [phone] if you need to get in touch with me or have additional questions or concerns. Thank you for choosing [facility] and trusting me as your healthcare provider. I look forward to seeing you again at your next office visit. I am honored to be a provider on your health care team and I remain dedicated to helping you achieve your health goals.      Coco Rodriguez, APRN-CNP         [1]   Family History  Problem Relation Name Age of Onset    Stroke Paternal Grandfather Osvaldo C Zadroga     Colon cancer Paternal Grandfather Osvaldo C Zadroga     Cancer Paternal Grandfather Osvaldo C Zadroga     COPD Mother Silvia Zadroga (Mother)     Depression Mother Silvia Zadroga (Mother)     Hearing loss Mother Silvia Vaughndroga (Mother)     Heart disease Mother Silvia Zadroga (Mother)     Hyperlipidemia Mother Silvia Zadroga (Mother)     Hypertension Mother Silvia Zadroga (Mother)     Kidney disease Mother Silvia Zadroga (Mother)     Miscarriages / Stillbirths Mother Silvia Zadroga (Mother)     Asthma Mother Silvia Zadroga (Mother)     Arthritis Mother Silvia Zadroga  (Mother)     Hyperlipidemia Father Osvaldo Dale Jr.     Hypertension Father Osvaldo Dale Jr.     Vision loss Father Osvaldo Dale Jr.     Arthritis Father Osvaldo Dale Jr.     Miscarriages / Stillbirths Maternal Grandmother Porsche Kirill     Stroke Maternal Grandmother Porsche Roy     Arthritis Maternal Grandmother Porsche Kirill     Stroke Paternal Grandmother Carina Dale     Arthritis Paternal Grandmother Carina Dale     COPD Mother's Sister Imani Roy     Depression Mother's Sister Imani Roy     Hearing loss Mother's Sister Imani Roy     Hyperlipidemia Mother's Sister Imani Roy     Hypertension Mother's Sister Imani Roy     Asthma Mother's Sister Imani Roy     Arthritis Mother's Sister Imani Roy     Heart disease Mother's Sister Imani Roy     Hernia Mother's Sister Imani Roy     COPD Mother's Brother Benjamin Roy     Hearing loss Mother's Brother Benjamin Roy     Heart disease Mother's Brother Benjamin Roy

## 2025-06-09 ENCOUNTER — APPOINTMENT (OUTPATIENT)
Dept: RADIOLOGY | Facility: HOSPITAL | Age: 50
End: 2025-06-09
Payer: COMMERCIAL

## 2025-06-10 ENCOUNTER — OFFICE VISIT (OUTPATIENT)
Dept: SURGICAL ONCOLOGY | Facility: HOSPITAL | Age: 50
End: 2025-06-10
Payer: COMMERCIAL

## 2025-06-10 ENCOUNTER — HOSPITAL ENCOUNTER (OUTPATIENT)
Dept: RADIOLOGY | Facility: HOSPITAL | Age: 50
Discharge: HOME | End: 2025-06-10
Payer: COMMERCIAL

## 2025-06-10 VITALS
HEART RATE: 72 BPM | DIASTOLIC BLOOD PRESSURE: 80 MMHG | BODY MASS INDEX: 25.83 KG/M2 | SYSTOLIC BLOOD PRESSURE: 114 MMHG | WEIGHT: 155 LBS | RESPIRATION RATE: 16 BRPM | HEIGHT: 65 IN

## 2025-06-10 DIAGNOSIS — Z08 ENCOUNTER FOR FOLLOW-UP SURVEILLANCE OF BREAST CANCER: Primary | ICD-10-CM

## 2025-06-10 DIAGNOSIS — Z87.2 HISTORY OF DIMPLING OF BREAST SKIN: ICD-10-CM

## 2025-06-10 DIAGNOSIS — Z85.3 ENCOUNTER FOR FOLLOW-UP SURVEILLANCE OF BREAST CANCER: Primary | ICD-10-CM

## 2025-06-10 DIAGNOSIS — R92.8 ABNORMAL FINDING ON BREAST IMAGING: ICD-10-CM

## 2025-06-10 DIAGNOSIS — R92.1 CALCIFICATION OF LEFT BREAST: ICD-10-CM

## 2025-06-10 DIAGNOSIS — Z79.810 USE OF TAMOXIFEN (NOLVADEX): ICD-10-CM

## 2025-06-10 PROCEDURE — 99213 OFFICE O/P EST LOW 20 MIN: CPT | Performed by: NURSE PRACTITIONER

## 2025-06-10 PROCEDURE — 77066 DX MAMMO INCL CAD BI: CPT | Performed by: STUDENT IN AN ORGANIZED HEALTH CARE EDUCATION/TRAINING PROGRAM

## 2025-06-10 PROCEDURE — 77062 BREAST TOMOSYNTHESIS BI: CPT | Performed by: STUDENT IN AN ORGANIZED HEALTH CARE EDUCATION/TRAINING PROGRAM

## 2025-06-10 PROCEDURE — 3008F BODY MASS INDEX DOCD: CPT | Performed by: NURSE PRACTITIONER

## 2025-06-10 PROCEDURE — 76642 ULTRASOUND BREAST LIMITED: CPT | Performed by: STUDENT IN AN ORGANIZED HEALTH CARE EDUCATION/TRAINING PROGRAM

## 2025-06-10 PROCEDURE — 76642 ULTRASOUND BREAST LIMITED: CPT | Mod: LT

## 2025-06-10 PROCEDURE — 77062 BREAST TOMOSYNTHESIS BI: CPT

## 2025-06-10 NOTE — PATIENT INSTRUCTIONS
Your clinical examination and imaging are stable. Please return in one year for bilateral diagnostic mammogram and office visit or sooner if you have any problems or concerns. Continue Tamoxifen 10mg daily.     You can see your health information, review clinical summaries from office visits & test results online when you follow your health with MY  Chart, a personal health record. To sign up go to [email or web address] [email or web address]. If you need assistance with signing up or trouble getting into your account call [employer] Patient Line 24/7 at [phone].    My office phone number is [phone] if you need to get in touch with me or have additional questions or concerns. Thank you for choosing [facility] and trusting me as your healthcare provider. I look forward to seeing you again at your next office visit. I am honored to be a provider on your health care team and I remain dedicated to helping you achieve your health goals.

## 2025-06-29 ASSESSMENT — PROMIS GLOBAL HEALTH SCALE
RATE_QUALITY_OF_LIFE: GOOD
RATE_AVERAGE_PAIN: 3
RATE_MENTAL_HEALTH: GOOD
RATE_AVERAGE_FATIGUE: MODERATE
RATE_PHYSICAL_HEALTH: GOOD
CARRYOUT_SOCIAL_ACTIVITIES: VERY GOOD
RATE_AVERAGE_FATIGUE: MODERATE
RATE_GENERAL_HEALTH: GOOD
RATE_PHYSICAL_HEALTH: GOOD
RATE_MENTAL_HEALTH: GOOD
RATE_AVERAGE_PAIN: 3
RATE_QUALITY_OF_LIFE: GOOD
EMOTIONAL_PROBLEMS: NEVER
RATE_SOCIAL_SATISFACTION: GOOD
CARRYOUT_PHYSICAL_ACTIVITIES: COMPLETELY
CARRYOUT_PHYSICAL_ACTIVITIES: COMPLETELY
RATE_GENERAL_HEALTH: GOOD
CARRYOUT_SOCIAL_ACTIVITIES: VERY GOOD
EMOTIONAL_PROBLEMS: NEVER
RATE_SOCIAL_SATISFACTION: GOOD

## 2025-07-01 ENCOUNTER — APPOINTMENT (OUTPATIENT)
Dept: PRIMARY CARE | Facility: CLINIC | Age: 50
End: 2025-07-01
Payer: COMMERCIAL

## 2025-07-01 ENCOUNTER — OFFICE VISIT (OUTPATIENT)
Dept: PRIMARY CARE | Facility: CLINIC | Age: 50
End: 2025-07-01
Payer: COMMERCIAL

## 2025-07-01 VITALS
SYSTOLIC BLOOD PRESSURE: 118 MMHG | DIASTOLIC BLOOD PRESSURE: 68 MMHG | HEART RATE: 84 BPM | WEIGHT: 159 LBS | TEMPERATURE: 97.1 F | HEIGHT: 65 IN | BODY MASS INDEX: 26.49 KG/M2 | RESPIRATION RATE: 16 BRPM

## 2025-07-01 DIAGNOSIS — N39.3 STRESS INCONTINENCE: ICD-10-CM

## 2025-07-01 LAB
POC APPEARANCE, URINE: CLEAR
POC BILIRUBIN, URINE: NEGATIVE
POC BLOOD, URINE: ABNORMAL
POC COLOR, URINE: YELLOW
POC GLUCOSE, URINE: NEGATIVE MG/DL
POC KETONES, URINE: NEGATIVE MG/DL
POC LEUKOCYTES, URINE: NEGATIVE
POC NITRITE,URINE: NEGATIVE
POC PH, URINE: 7 PH
POC PROTEIN, URINE: NEGATIVE MG/DL
POC SPECIFIC GRAVITY, URINE: 1.01
POC UROBILINOGEN, URINE: 0.2 EU/DL

## 2025-07-01 PROCEDURE — 81002 URINALYSIS NONAUTO W/O SCOPE: CPT | Performed by: FAMILY MEDICINE

## 2025-07-01 PROCEDURE — 99214 OFFICE O/P EST MOD 30 MIN: CPT | Performed by: FAMILY MEDICINE

## 2025-07-01 PROCEDURE — 1036F TOBACCO NON-USER: CPT | Performed by: FAMILY MEDICINE

## 2025-07-01 PROCEDURE — 3008F BODY MASS INDEX DOCD: CPT | Performed by: FAMILY MEDICINE

## 2025-07-01 ASSESSMENT — PATIENT HEALTH QUESTIONNAIRE - PHQ9
SUM OF ALL RESPONSES TO PHQ9 QUESTIONS 1 AND 2: 0
1. LITTLE INTEREST OR PLEASURE IN DOING THINGS: NOT AT ALL
2. FEELING DOWN, DEPRESSED OR HOPELESS: NOT AT ALL

## 2025-07-01 NOTE — PROGRESS NOTES
Meryl Loaiza is a 50 y.o. female here today for urinary sxs.     HPI       She has noticed she is now having some major incontinence on a few occasions.  Especially with laughing.  Having some dull ache over bladder at times too.  No burning or increased frequency.  Some minor incontinence with cough and sneeze for a few years but now much worse since her hysterectomy about 1 year ago.    No urge incontinence sxs on review.  She denies any constipation or diarrhea.  Estrogen cream has helped with vaginal issues -I prescribed it at her last visit and she is using it 3 times weekly.  She denies any pain with sex.  She denies any history of bladder distention or inability to void.  She has never seen blood in her urine.      Current Outpatient Medications   Medication Instructions    estradiol (ESTRACE) 1 g, vaginal, 3 times weekly    multivitamin tablet 1 tablet, Daily    SUMAtriptan (Imitrex) 25 mg tablet 2 times daily    tamoxifen (NOLVADEX) 10 mg, oral, Daily, Take with water or any other nonalcoholic drink with or without food at around the same time(s) every day.       Patient Active Problem List    Diagnosis Date Noted    Ductal carcinoma in situ (DCIS) of left breast 10/03/2023    Migraine headache 06/12/2023    Cyst of nipple, right 11/06/2024    History of hysterectomy with bilateral oophorectomy 08/30/2024    Abnormal uterine bleeding 08/29/2024    Achilles tendinitis 08/29/2024    Anxiety disorder 06/12/2023    Calcific Achilles tendinitis of left lower extremity 06/12/2023    Changing nevus 06/12/2023    Melasma 06/12/2023    Postural dizziness 06/12/2023    Recurrent epistaxis 06/12/2023    Solar lentigo 06/12/2023    Breast screening 06/12/2023    IUD contraception 06/12/2023    Presbyopia of both eyes 11/12/2019    Regular astigmatism of both eyes 11/12/2019    Low back pain 11/06/2018    Pelvic floor weakness 10/04/2018    Stress incontinence 10/04/2018    Choroidal nevus, left eye 04/28/2015  "   RCE (recurrent corneal erosion) 02/24/2014    Pelvic mass 03/01/2024         Objective      Visit Vitals    Visit Vitals  /68   Pulse 84   Temp 36.2 °C (97.1 °F)   Resp 16   Ht 1.651 m (5' 5\")   Wt 72.1 kg (159 lb)   BMI 26.46 kg/m²   OB Status Hysterectomy   Smoking Status Never   BSA 1.82 m²       Body mass index is 26.46 kg/m².     Physical Exam     General - Not in acute distress and cooperative.  Build & Nutrition - Well developed  Posture - Normal  Gait - Normal  Mental Status - alert and oriented x 3    Eyes - Bilateral - pupils equal and round, sclera clear and lids pink without edema or mass.      Skin - Warm and dry with no rashes on visible skin or abdomen    Abdomen - Normal bowel sounds.  No CVA tenderness, guarding, rebound tenderness or suprapubic tenderness.    Assessment & Plan  Stress incontinence  Patient is experiencing worsening stress incontinence.  Her urinalysis today was normal.  Much education given on condition, cause, possible treatments and outcomes.  I recommend that she do Kegel exercises 3 times daily.  We discussed how to do these and I recommend that she do 3 sets of 10 and hold each 1 for at least 10 seconds.  There really are no FDA approved medications for stress incontinence and she is not interested in trying medications at this point.  The vaginal estrogen that she is using can sometimes be helpful.  There is no obesity and she does not smoke.  I am going to refer her to pelvic floor physical therapy for further evaluation and treatment.  She has a periodic health exam scheduled in about 6 weeks and we can recheck how she is doing at that point.  If symptoms do not improve over time I may consider a referral to urogynecology.    Orders:    POCT UA (nonautomated) manually resulted    Referral to Physical Therapy; Future               Orders Placed This Encounter   Procedures    Referral to Physical Therapy    POCT UA (nonautomated) manually resulted        No orders of " the defined types were placed in this encounter.

## 2025-07-01 NOTE — ASSESSMENT & PLAN NOTE
Patient is experiencing worsening stress incontinence.  Her urinalysis today was normal.  Much education given on condition, cause, possible treatments and outcomes.  I recommend that she do Kegel exercises 3 times daily.  We discussed how to do these and I recommend that she do 3 sets of 10 and hold each 1 for at least 10 seconds.  There really are no FDA approved medications for stress incontinence and she is not interested in trying medications at this point.  The vaginal estrogen that she is using can sometimes be helpful.  There is no obesity and she does not smoke.  I am going to refer her to pelvic floor physical therapy for further evaluation and treatment.  She has a periodic health exam scheduled in about 6 weeks and we can recheck how she is doing at that point.  If symptoms do not improve over time I may consider a referral to urogynecology.    Orders:    POCT UA (nonautomated) manually resulted    Referral to Physical Therapy; Future

## 2025-07-10 ENCOUNTER — EVALUATION (OUTPATIENT)
Dept: PHYSICAL THERAPY | Facility: CLINIC | Age: 50
End: 2025-07-10
Payer: COMMERCIAL

## 2025-07-10 DIAGNOSIS — N81.89 PELVIC FLOOR WEAKNESS: ICD-10-CM

## 2025-07-10 DIAGNOSIS — R35.0 URINARY FREQUENCY: ICD-10-CM

## 2025-07-10 DIAGNOSIS — N39.3 STRESS INCONTINENCE: Primary | ICD-10-CM

## 2025-07-10 PROCEDURE — 97110 THERAPEUTIC EXERCISES: CPT | Mod: GP | Performed by: PHYSICAL THERAPIST

## 2025-07-10 PROCEDURE — 97162 PT EVAL MOD COMPLEX 30 MIN: CPT | Mod: GP | Performed by: PHYSICAL THERAPIST

## 2025-07-10 ASSESSMENT — PATIENT HEALTH QUESTIONNAIRE - PHQ9
2. FEELING DOWN, DEPRESSED OR HOPELESS: NOT AT ALL
1. LITTLE INTEREST OR PLEASURE IN DOING THINGS: NOT AT ALL
SUM OF ALL RESPONSES TO PHQ9 QUESTIONS 1 AND 2: 0

## 2025-07-10 NOTE — PROGRESS NOTES
Physical Therapy Evaluation and Treatment      Patient Name: Meryl Loaiza  MRN: [id number]  Today's Date: 7/10/2025  Time Calculation  Start Time: 1007  Stop Time: 1110  Time Calculation (min): 63 min      PT Evaluation Time Entry  PT Evaluation (Moderate) Time Entry: 30  PT Therapeutic Procedures Time Entry  Therapeutic Exercise Time Entry: 25                   INSURANCE:  Visit number: 1  HOMA MENDOZA COPAY 0 DED [phone])  COVERAGE 100 OOP [phone])   AUTH REQ# [id number] 1 VISIT 7-10-25 to 7-24-25  REF# [id number]     Referring Provider: Oswaldo Dunaway MD   Hx: more anxiety lately, Reduction of breasts in 1993, Disc bulge 8 years ago  Problem List[1]     ASSESSMENT:  PT Assessment Results: decreased knowledge of HEP, activity limitations, ADLs/IADLs/self care skills, flexibility, motor function/control/tone, pain, participation restrictions, range of motion/joint mobility, strength, posture, transfers, coordination, balance, gait/locomotion, integumentary, decreased knowledge of precautions.  Rehab Prognosis: Good  Evaluation/Treatment Tolerance: Patient tolerated treatment well  Stable and/or uncomplicated characteristics    Meryl Loaiza is a 50 y.o. female presenting to the clinic with pelvic floor dysfunction and mixed stress/urge incontinence with urinary frequency. Pt demonstrates decreased ROM and strength of the pelvis/B hips and abdominals causing pain and dysfunction with bending down, squatting, sitting, driving, toileting, and laugh/cough/sneezing. Pt was given and reviewed HEP. The patient was educated on the importance of general therapeutic exercise, anatomy, function, & likely cause of impairments. Pt will benefit from skilled PT in order to increase ROM and strength of the pelvis/B hips and abdominals so that the pt can perform ADLs without pain and return to PLOF.     PLAN:  Treatments/Interventions: traction, gait training, dry needling,  edema control, education/instruction, home program, self care/home management, manual therapy, neuromuscular re-education, therapeutic activities, therapeutic exercises, modalities, therapeutic elastic taping.   PT Plan: Skilled PT  PT Frequency: 1 time per week  Duration: 6 visits  Onset Date: 01/01/24  Number of Treatments Authorized: Requires Authorization  Rehab Potential: Good  Plan of Care Agreement: Patient    Goals -    STG - After about 6 weeks:  Pt will report less than a 4/10 pain at the worst.  Pt will be able to manage changes in intra-abdominal pressure with appropriate pelvic floor and TA muscle activation.  Pt will be able to coordinate pelvic floor with thoracic diaphragm during functional activities that cause symptoms.  Pt will increase by 1 MMT grade for B hips.  Pt will be able to increase time between urination to every 2 hours.  Pt will decrease urinary leakage episodes to less than 1 times per week.    LTG - after about 12 weeks:  Pt will report less than a 0-2/10 pain at the worst.  Pt will have at least 4+/5 to 5/5 strength for B hips.   Pt will have AROM to WFL for the lumbar spine.   Pt will be able to increase time between urination to every 2-4 hours.  Pt will decrease urinary leakage episodes to 1 times per month.  Pt will report a significant improvement with Female NIH-CPSI score by 5 points (MDC).  Pt will be independent with progressed HEP.    CURRENT PROBLEM:   1. Stress incontinence  Referral to Physical Therapy    Follow Up In Physical Therapy      2. Pelvic floor weakness        3. Urinary frequency            Subjective      PELVIC HISTORY:  History of Current Episode/Chief Complaint -  Pt states that the last handful of years she has been having issues with perimenopause and leakage. Pt reports that she did have a lumpectomy with radiation and then a hysterectomy 4/1/24. Pt states that just prior to her hysterectomy she started to get more pressure like feeling over her  bladder that felt like a UTI and got worse after her surgery. Pt states that she was tested and does not have a UTI. In March she had one episode where she had a full leakage when laughing.    Date Onset - 1/1/2024    Mechanism of Injury -    Insidious Onset    Pelvic Pain -   Pain when emptying bladder: No  Pain with wiping or tight clothing: wiping needs estrogen cream, educated to use pea size amount  Pain with intercourse: cream helped  History of back pain: Yes    Pain Location: Lumbar and bladder pressure  Pain Best: 0/10  Pain Today: 1-2/10  Pain Worst: 4/10  Pain Type: Intermittent, Achy/Dull, Pressure  Pain Exacerbating Factors: bending down, squatting, sitting, driving.  Pain Relieving Factors: Rest, heating pad  Difficulty Sleeping: Yes, due to menopause  Loss of Appetite, Unexpected Weight-loss: No    Pelvic Exercise -   Do you do Kegels? Every so often  Current exercise regime: Belly Dance, has a     Bladder Hx -   Excessive Urinary Urgency: Yes  Water Consumption a day: Yes, has low blood pressure, so tries to drink a lot  Daytime Voiding Frequency: 15-20  Nighttime Voiding Frequency: 3-4  Unintentional urine loss frequency: every other day   Leakage occurs with: post-voiding, laughing, cough, sneeze, running water, making coffee, and urge.   Leakage amount: normally small, but can be moderate to large amount  Difficulty initiating flow of urine: No  Slow/weak urine stream: No  Do you push to urinate: No  Able to completely empty bladder: Yes    Bowel Hx -   Excessive Bowel Urgency: No  BM Frequency: 2-3 times a day  Frequent Diarrhea: No  Frequent constipation/straining/incomplete emptying: No  Supplements: sometimes probiotic  Unintentional stool loss: No  [place] Stool Scale: 4-5    Home Living -    Pt lives with her .     Patient Stated Goals -   Reduce leakage to hopefully none  Reduce urinary frequency  Reduce low back pain    PRECAUTIONS -    None    Prior Level of  Function -    I with ADLs/IADLs     Objective     Hip AROM (degrees) - WFL grossly except reduced IR an extension    Hip MMT (/5) -  R hip Flexion: 4   R hip ER: 4-   R hip IR: 4   R hip Abduction: 4   L hip Flexion: 4+    L hip ER: 4-   L hip IR: 4   L hip Abduction: 4     Lumbar AROM -   Lumbar Flexion: 80%  Lumbar Extension: 40%  R Lumbar Side Bendin%  L Lumbar Side Bendin%    Posture -   Increased Lumbar Lordosis/APT  Slight Sway Back Posture    Functional Assessment/Special Tests -    Trendelenburg positive left  Breathing: slight chest breath, but able to correct easily to diaphragmatic breath  DOROTHY negative bilateral  Chito positive bilateral (L>R)  SLR negative bilateral    Palpation - Consent to External Palpation Verbally Given  Pelvic Alignment: R anteriorly rotated innominate - corrected with MET    Internal Palpation Exam - Plan to perform next visit with patient consent    Outcome Measures -   Other Measures  Other Outcome Measures: Female NIH-CPSI: 22 (Pain 6, Urinary 5, QOL 11)     Treatment -   Evaluation - Moderate (73030)  Therapeutic Exercise (48406) -     Diaphragmatic Breathing [place] min  Modified Chito Stretch: 30 sec B  Bridge on Heels: x5  Supine TA Bracing - Hands on Ground: 5 sec x5  Education on Micturition Cycle, Urge Control, Bladder Irritants, Bladder Diary/Instructions, Pelvic Bracing    OP Patient Education -   Access Code: [email or web address]  URL: [email or web address]  Date: 07/10/2025  Prepared by: Zara Copeland    Exercises  - Diaphragmatic Breathing Grounding   - Modified Chiot Stretch  - 1-2 x daily - 3 sets - 30 seconds hold  - Bridge on Heels  - 1-2 x daily - 3 sets - 10 reps - 2 sec hold  - Supine Transversus Abdominis Bracing - Hands on Ground  - 1-2 x daily - 2 sets - 10 reps - 5 sec hold    Handouts  - Micturition Cycle Handout  - Urge Control Handout   - Bladder Irritants  - Bladder Diary and Instructions          [1]   Patient Active  Problem List  Diagnosis    Anxiety disorder    Calcific Achilles tendinitis of left lower extremity    Changing nevus    Melasma    Migraine headache    Postural dizziness    Recurrent epistaxis    Solar lentigo    Breast screening    IUD contraception    Choroidal nevus, left eye    Ductal carcinoma in situ (DCIS) of left breast    Pelvic floor weakness    Presbyopia of both eyes    RCE (recurrent corneal erosion)    Regular astigmatism of both eyes    Stress incontinence    Pelvic mass    Abnormal uterine bleeding    Achilles tendinitis    Low back pain    History of hysterectomy with bilateral oophorectomy    Cyst of nipple, right    Urinary frequency

## 2025-08-05 ENCOUNTER — TREATMENT (OUTPATIENT)
Dept: PHYSICAL THERAPY | Facility: CLINIC | Age: 50
End: 2025-08-05
Payer: COMMERCIAL

## 2025-08-05 DIAGNOSIS — N39.3 STRESS INCONTINENCE: ICD-10-CM

## 2025-08-05 PROCEDURE — 97110 THERAPEUTIC EXERCISES: CPT | Mod: GP | Performed by: PHYSICAL THERAPIST

## 2025-08-05 PROCEDURE — 97140 MANUAL THERAPY 1/> REGIONS: CPT | Mod: GP | Performed by: PHYSICAL THERAPIST

## 2025-08-05 NOTE — PROGRESS NOTES
Physical Therapy Treatment    Patient Name: Meryl Loaiza  MRN: [id number]  Today's Date: 8/5/2025  Time Calculation  Start Time: 0907  Stop Time: 1003  Time Calculation (min): 56 min       PT Therapeutic Procedures Time Entry  Therapeutic Exercise Time Entry: 20  Manual Therapy Time Entry: 25                   INSURANCE:  Visit number: 2/7  ANTHEM APPROVED 6 PT VISITS  7-29-25 to 9-26-25  AUTH#  [id number], [id number]  HOMA BOA COPAY 0 DED [phone])  COVERAGE 100 OOP [phone](1146)   AUTH REQ# [id number] 1 VISIT 7-10-25 to 7-24-25  REF# [id number]     Referring Provider: Oswaldo Dunaway MD   Hx: more anxiety lately, Reduction of breasts in 1993, Disc bulge 8 years ago  Problem List[1]     CURRENT PROBLEM:  1. Stress incontinence  Follow Up In Physical Therapy        SUBJECTIVE:   General -   Pt is doing home exercises.  Traveling issues recently    Urgency frequency is improving. She has been trying not to go just in case. Breathing and urge control techniques working.    Laughed really hard and had some leakage then   But only once since last time     Pain -    Pain Location/Description: Lumbar   Pain Today: 0-1/10  Pain Worst: 3-4/10     Precautions -    None    OBJECTIVE:     Internal Palpation Exam - Consent to Pelvic Floor Internal Exam Verbally Given, Performed using universal precautions/gloves. Patient was asked if pt would like a Chaperone During Intimate Examination and pt declined. Documented in screenings.      Pelvic Floor External Visual Observation -   Contraction with Anal Amelia: present  Bearing Down with distension around anus: reduced  Cough with Contraction: Present    External Palpation -   Perineal Body: no TTP  Superficial Transverse Perineal Muscles: (R) no TTP; (L) no TTP    Visual Observation -   Good Tissue Color: Yes  [place] of Clitoris has good movement: Yes, noted slight vaginal dryness/atrophy    Internal Palpation -   Ischiocavernosus: (R)  mild TTP; (L) mild TTP  Bulbocavernosus: (R) mild TTP; (L) mild TTP  Periurethrals: (R) mild TTP; (L) moderate TTP  Levator Ani: (R) mild TTP; (L) mild - moderate TTP  Obturator Internus: (R) mild TTP; (L) mild - moderate TTP  Piriformis: (R) mild TTP; (L) mild TTP    Pelvic Floor Strength -   Power MVC: 2 /5 (0 = Zero/None, 1 = Trace, 2 = Poor/Asymmetrical, 3 = Fair/ Slight Lift, 4 = Good/Lift, 5 = Strong/Pull)    Treatment -   Therapeutic Exercise (06597) -  Hip IIR/ER Windshield Wipers: 2 min  Supine Pelvic Floor Stretch: 1 min  Seated Happy Baby: 1 min    Manual Therapy (67087) -    Internal Pelvic Floor Assessment - Gentle TPR  Myofascial Mobilizations of L inferior diaphragmatic fascial attachments, L medial lower rib fascia    OP Patient Education -   Access Code: [email or web address]  URL: [email or web address]  Date: 08/05/2025  Prepared by: Zara Copeland  Added Exercises  - Hip Internal and External Rotation Windshield Wipers  - 1-2 x daily - 10 reps - 2-3 sec hold  - Supine Pelvic Floor Stretch  - 1-2 x daily - 1-2 min hold  - Seated Happy Baby With Trunk Flexion For Pelvic Relaxation  - 1-2 x daily - 1-2 min hold    ASSESSMENT:     Internal pelvic floor assessment was performed with patient consent and gentle TPR for reduced pain/tone and spasm. Myofascial mobilizations performed due to increased tension on L side during internal assessment. Pt was introduced to more pelvic floor relaxation exercises. Pt tolerated session well and is progressing towards functional needs. Continue to progress pt within tolerance and POC.    PLAN:    Continue to progress pt within tolerance. Assess response to internal assessment and myofascial mobilizations.         [1]   Patient Active Problem List  Diagnosis    Anxiety disorder    Calcific Achilles tendinitis of left lower extremity    Changing nevus    Melasma    Migraine headache    Postural dizziness    Recurrent epistaxis    Solar lentigo    Breast screening     IUD contraception    Choroidal nevus, left eye    Ductal carcinoma in situ (DCIS) of left breast    Pelvic floor weakness    Presbyopia of both eyes    RCE (recurrent corneal erosion)    Regular astigmatism of both eyes    Stress incontinence    Pelvic mass    Abnormal uterine bleeding    Achilles tendinitis    Low back pain    History of hysterectomy with bilateral oophorectomy    Cyst of nipple, right    Urinary frequency

## 2025-08-06 DIAGNOSIS — Z12.12 SCREENING FOR COLORECTAL CANCER: ICD-10-CM

## 2025-08-06 DIAGNOSIS — Z12.11 SCREENING FOR COLORECTAL CANCER: ICD-10-CM

## 2025-08-12 ENCOUNTER — TREATMENT (OUTPATIENT)
Dept: PHYSICAL THERAPY | Facility: CLINIC | Age: 50
End: 2025-08-12
Payer: COMMERCIAL

## 2025-08-12 DIAGNOSIS — N39.3 STRESS INCONTINENCE: ICD-10-CM

## 2025-08-12 PROCEDURE — 97140 MANUAL THERAPY 1/> REGIONS: CPT | Mod: GP | Performed by: PHYSICAL THERAPIST

## 2025-08-12 PROCEDURE — 97110 THERAPEUTIC EXERCISES: CPT | Mod: GP | Performed by: PHYSICAL THERAPIST

## 2025-08-19 ENCOUNTER — APPOINTMENT (OUTPATIENT)
Dept: PHYSICAL THERAPY | Facility: CLINIC | Age: 50
End: 2025-08-19
Payer: COMMERCIAL

## 2025-08-26 ENCOUNTER — TREATMENT (OUTPATIENT)
Dept: PHYSICAL THERAPY | Facility: CLINIC | Age: 50
End: 2025-08-26
Payer: COMMERCIAL

## 2025-08-26 DIAGNOSIS — N39.3 STRESS INCONTINENCE: ICD-10-CM

## 2025-08-26 PROCEDURE — 97110 THERAPEUTIC EXERCISES: CPT | Mod: GP | Performed by: PHYSICAL THERAPIST

## 2025-08-26 PROCEDURE — 97112 NEUROMUSCULAR REEDUCATION: CPT | Mod: GP | Performed by: PHYSICAL THERAPIST

## 2025-09-09 ENCOUNTER — APPOINTMENT (OUTPATIENT)
Dept: PHYSICAL THERAPY | Facility: CLINIC | Age: 50
End: 2025-09-09
Payer: COMMERCIAL

## 2025-09-15 ENCOUNTER — APPOINTMENT (OUTPATIENT)
Dept: PRIMARY CARE | Facility: CLINIC | Age: 50
End: 2025-09-15
Payer: COMMERCIAL

## (undated) DEVICE — TROCAR SYSTEM, BALLOON, KII GELPORT, 12 X 100MM

## (undated) DEVICE — TOWEL, SURGICAL, NEURO, O/R, 16 X 26, BLUE, STERILE

## (undated) DEVICE — REST, HEAD, BAGEL, 9 IN

## (undated) DEVICE — SYRINGE, W/CANNULA, VIAL ACCESS, INTERLINK, W/NEEDLE, 15 G

## (undated) DEVICE — TUBE SET, PNEUMOCLEAR, SMOKE EVACU, HIGH-FLOW

## (undated) DEVICE — RETRACTOR, CERVICAL CUP, VCARE, STANDARD

## (undated) DEVICE — APPLICATOR, ARISTA, FLEXITIP, XL, LF

## (undated) DEVICE — SYSTEM, FIOS FIRST ENTRY, 5 X 100MM, KII ADVANCED FIXATION

## (undated) DEVICE — GOWN, SURGICAL, SMARTGOWN, XLARGE, STERILE

## (undated) DEVICE — IRRIGATION SET, CYSTOSCOPY, F/CONSTANT/INTERMITTENT, 8 GTT/CC, 77 IN

## (undated) DEVICE — DRAPE, PAD, PREP, W/ 9 IN CUFF, 24 X 41, LF, NS

## (undated) DEVICE — Device

## (undated) DEVICE — SUTURE, VICRYL, 4-0, 18 IN, UNDYED BR PS-2

## (undated) DEVICE — MANIFOLD, 4 PORT NEPTUNE STANDARD

## (undated) DEVICE — HOLSTER, JET SAFETY

## (undated) DEVICE — DRAPE, TIBURON W/ADHESIVE, 19 X 30

## (undated) DEVICE — BAG, TISSUE RETRIEVAL, 10MM, ANCHOR

## (undated) DEVICE — OCCLUDER, COLPO-PNEUMO

## (undated) DEVICE — CAUTERY, PENCIL, PUSH BUTTON, SMOKE EVAC, 70MM

## (undated) DEVICE — HOLSTER, ELECTROSURGERY ACCESSORY, STERILE

## (undated) DEVICE — LIGASURE, V SEALER/DIVIDER  5MM BLUNT TIP

## (undated) DEVICE — SUTURE, VICRYL, 0, 27 IN, UR-6, VIOLET

## (undated) DEVICE — SYRINGE, LUER LOCK, 12ML

## (undated) DEVICE — ELECTRODE, OPTI2 LAPAROSCOPIC SPATULA, CURVED

## (undated) DEVICE — SYRINGE, 60 CC, LUER LOCK, MONOJECT, W/O CAP, LF

## (undated) DEVICE — POSITIONING, THE PINK PAD, PIGAZZI SYSTEM

## (undated) DEVICE — DRESSING, ADHESIVE, ISLAND, TELFA, 2 X 3.75 IN, LF

## (undated) DEVICE — PUMP, STRYKERFLOW 2 & HANDPIECE W/10FT. IRRIGATION TUBING

## (undated) DEVICE — HEMOSTAT, ARISTA, ABSORBABLE, 3 GRAMS

## (undated) DEVICE — COVER, CART, 45 X 27 X 48 IN, CLEAR

## (undated) DEVICE — TUBE, SALEM SUMP, 16 FR X 48IN, ENFIT

## (undated) DEVICE — PREP TRAY, SKIN, DRY, W/GLOVES

## (undated) DEVICE — SYRINGE, 35 CC, LUER LOCK, MONOJECT, W/O CAP, LF